# Patient Record
Sex: FEMALE | Race: WHITE | NOT HISPANIC OR LATINO | Employment: OTHER | URBAN - NONMETROPOLITAN AREA
[De-identification: names, ages, dates, MRNs, and addresses within clinical notes are randomized per-mention and may not be internally consistent; named-entity substitution may affect disease eponyms.]

---

## 2024-09-09 ENCOUNTER — TRANSCRIBE ORDERS (OUTPATIENT)
Dept: ADMINISTRATIVE | Facility: HOSPITAL | Age: 64
End: 2024-09-09
Payer: MEDICARE

## 2024-09-09 DIAGNOSIS — J18.1 LUNG CONSOLIDATION: ICD-10-CM

## 2024-09-09 DIAGNOSIS — J44.9 COPD, SEVERE: ICD-10-CM

## 2024-09-09 DIAGNOSIS — R91.8 ABNORMAL CT LUNG SCREENING: Primary | ICD-10-CM

## 2024-09-16 ENCOUNTER — HOSPITAL ENCOUNTER (OUTPATIENT)
Dept: CT IMAGING | Facility: HOSPITAL | Age: 64
Discharge: HOME | End: 2024-09-16
Payer: MEDICARE

## 2024-09-16 ENCOUNTER — TRANSCRIBE ORDERS (OUTPATIENT)
Dept: ADMINISTRATIVE | Facility: HOSPITAL | Age: 64
End: 2024-09-16
Payer: MEDICARE

## 2024-09-16 DIAGNOSIS — R91.8 ABNORMAL CT LUNG SCREENING: ICD-10-CM

## 2024-09-16 DIAGNOSIS — J18.1 LUNG CONSOLIDATION: ICD-10-CM

## 2024-09-16 DIAGNOSIS — J44.9 COPD, SEVERE: ICD-10-CM

## 2024-09-16 PROCEDURE — 78815 PET IMAGE W/CT SKULL-THIGH: CPT

## 2024-09-16 PROCEDURE — A9552 F18 FDG: HCPCS | Performed by: NURSE PRACTITIONER

## 2024-09-16 PROCEDURE — 0 FLUDEOXYGLUCOSE F18 SOLUTION: Performed by: NURSE PRACTITIONER

## 2024-09-16 RX ADMIN — FLUDEOXYGLUCOSE F 18 1 DOSE: 200 INJECTION, SOLUTION INTRAVENOUS at 10:43

## 2024-09-24 ENCOUNTER — TELEPHONE (OUTPATIENT)
Dept: MRI IMAGING | Facility: HOSPITAL | Age: 64
End: 2024-09-24
Payer: MEDICARE

## 2024-10-09 NOTE — PROGRESS NOTES
"Chief Complaint  Lung Nodule    Subjective    History of Present Illness {  Problem List  Visit Diagnosis   Encounters  Notes  Medications  Labs  Result Review Imaging  Media     Deana Stack presents to [facility] PULMONARY & CRITICAL CARE MEDICINE for:    History of Present Illness  Ms. Stack is here as a new patient to establish care.  She had a low-dose CT chest completed in July 2024 showing a new left upper lobe lung nodule measuring approximately 24 x 12 mm. PET scan was completed in September 2024 showing abnormal uptake in area of concern, SUV 4.3. She reports a history of copd. She used to be on Spiriva but is not currently on any inhalers. She tells me her breathing is fine. Her mother had breast cancer. She smokes 0.5-1 pack of cigarettes per day and has for approximately 47 years.She does tell me she had bronchitis and pneumonia a lot as a young person.        Prior to Admission medications    Not on File       Social History     Socioeconomic History    Marital status:    Tobacco Use    Smoking status: Every Day     Current packs/day: 0.50     Average packs/day: 0.5 packs/day for 47.8 years (23.9 ttl pk-yrs)     Types: Cigarettes     Start date: 1977    Smokeless tobacco: Never   Vaping Use    Vaping status: Never Used       Objective   Vital Signs:   /72   Pulse 70   Ht 154.9 cm (61\")   Wt 61 kg (134 lb 6.4 oz)   SpO2 93%   BMI 25.39 kg/m²     Physical Exam   Result Review :{ Labs  Result Review  Imaging  Med Tab  Media :          No results found for this or any previous visit.    Rest/Exercise Pulse Ox Values          10/14/2024    09:15   Rest/Exercise Pulse Ox Results   Rest room air SAT % 98   Exercise room air SAT % 97                CT outside films (07/31/2024 00:00)   My interpretation of imaging: Emphysema, left upper lobe lung nodule        Assessment and Plan { Problem List  Visit Diagnosis  ROS  Review (Popup)  Health Maintenance "  Quality  BestPractice  Medications  [facility]  SnapShot Encounters  Media      Diagnoses and all orders for this visit:    1. Left upper lobe pulmonary nodule (Primary)  Comments:  schedule for Ion robotic bronchoscopy with Dr. Dewitt. Risks and benefits discussed. She is not on any blood thinners.    2. Centrilobular emphysema  Comments:  walking oximetry was favorable. Check AAT.  Orders:  -     Walking Oximetry  -     Cancel: Alpha - 1 - Antitrypsin; Future  -     Alpha - 1 - Antitrypsin; Future    3. Current every day smoker  Comments:  cessation education provided.          Plan as above. Office follow up in a few weeks post procedure. Call sooner if needed.     Marilyn Reyes, APRKATY  10/14/2024  10:07 CDT    Follow Up {Instructions Charge Capture  Follow-up Communications   Return in about 5 weeks (around 11/18/2024) for Full pft.    Patient was given instructions and counseling regarding her condition or for health maintenance advice. Please see specific information pulled into the AVS if appropriate.

## 2024-10-14 ENCOUNTER — TELEPHONE (OUTPATIENT)
Dept: PULMONOLOGY | Facility: CLINIC | Age: 64
End: 2024-10-14

## 2024-10-14 ENCOUNTER — PREP FOR SURGERY (OUTPATIENT)
Dept: OTHER | Facility: HOSPITAL | Age: 64
End: 2024-10-14
Payer: MEDICARE

## 2024-10-14 ENCOUNTER — OFFICE VISIT (OUTPATIENT)
Dept: PULMONOLOGY | Facility: CLINIC | Age: 64
End: 2024-10-14
Payer: MEDICARE

## 2024-10-14 VITALS
BODY MASS INDEX: 25.37 KG/M2 | SYSTOLIC BLOOD PRESSURE: 118 MMHG | DIASTOLIC BLOOD PRESSURE: 72 MMHG | HEIGHT: 61 IN | HEART RATE: 70 BPM | WEIGHT: 134.4 LBS | OXYGEN SATURATION: 93 %

## 2024-10-14 DIAGNOSIS — R91.1 LEFT UPPER LOBE PULMONARY NODULE: Primary | ICD-10-CM

## 2024-10-14 DIAGNOSIS — F17.200 CURRENT EVERY DAY SMOKER: Chronic | ICD-10-CM

## 2024-10-14 DIAGNOSIS — J43.2 CENTRILOBULAR EMPHYSEMA: Chronic | ICD-10-CM

## 2024-10-14 DIAGNOSIS — R91.1 LEFT UPPER LOBE PULMONARY NODULE: Primary | Chronic | ICD-10-CM

## 2024-10-14 PROCEDURE — 1160F RVW MEDS BY RX/DR IN RCRD: CPT | Performed by: NURSE PRACTITIONER

## 2024-10-14 PROCEDURE — 1159F MED LIST DOCD IN RCRD: CPT | Performed by: NURSE PRACTITIONER

## 2024-10-14 PROCEDURE — 99204 OFFICE O/P NEW MOD 45 MIN: CPT | Performed by: NURSE PRACTITIONER

## 2024-10-14 RX ORDER — MIRTAZAPINE 15 MG/1
15 TABLET, ORALLY DISINTEGRATING ORAL NIGHTLY
COMMUNITY

## 2024-10-14 RX ORDER — SODIUM CHLORIDE 9 MG/ML
40 INJECTION, SOLUTION INTRAVENOUS AS NEEDED
OUTPATIENT
Start: 2024-10-28 | End: 2024-10-29

## 2024-10-14 RX ORDER — SODIUM CHLORIDE 0.9 % (FLUSH) 0.9 %
10 SYRINGE (ML) INJECTION EVERY 12 HOURS SCHEDULED
OUTPATIENT
Start: 2024-10-14

## 2024-10-14 RX ORDER — HYDROXYZINE HYDROCHLORIDE 10 MG/1
10 TABLET, FILM COATED ORAL 3 TIMES DAILY PRN
COMMUNITY

## 2024-10-14 RX ORDER — SODIUM CHLORIDE 0.9 % (FLUSH) 0.9 %
10 SYRINGE (ML) INJECTION AS NEEDED
OUTPATIENT
Start: 2024-10-14

## 2024-10-14 NOTE — H&P
"Chief Complaint  Lung Nodule    Subjective    History of Present Illness {  Problem List  Visit Diagnosis   Encounters  Notes  Medications  Labs  Result Review Imaging  Media     Deana Stack presents to [facility] PULMONARY & CRITICAL CARE MEDICINE for:    History of Present Illness  Ms. Stack is here as a new patient to establish care.  She had a low-dose CT chest completed in July 2024 showing a new left upper lobe lung nodule measuring approximately 24 x 12 mm. PET scan was completed in September 2024 showing abnormal uptake in area of concern, SUV 4.3. She reports a history of copd. She used to be on Spiriva but is not currently on any inhalers. She tells me her breathing is fine. Her mother had breast cancer. She smokes 0.5-1 pack of cigarettes per day and has for approximately 47 years.She does tell me she had bronchitis and pneumonia a lot as a young person.        Prior to Admission medications    Not on File       Social History     Socioeconomic History    Marital status:    Tobacco Use    Smoking status: Every Day     Current packs/day: 0.50     Average packs/day: 0.5 packs/day for 47.8 years (23.9 ttl pk-yrs)     Types: Cigarettes     Start date: 1977    Smokeless tobacco: Never   Vaping Use    Vaping status: Never Used       Objective   Vital Signs:   /72   Pulse 70   Ht 154.9 cm (61\")   Wt 61 kg (134 lb 6.4 oz)   SpO2 93%   BMI 25.39 kg/m²     Physical Exam   Result Review :{ Labs  Result Review  Imaging  Med Tab  Media :          No results found for this or any previous visit.    Rest/Exercise Pulse Ox Values          10/14/2024    09:15   Rest/Exercise Pulse Ox Results   Rest room air SAT % 98   Exercise room air SAT % 97                CT outside films (07/31/2024 00:00)   My interpretation of imaging: Emphysema, left upper lobe lung nodule        Assessment and Plan { Problem List  Visit Diagnosis  ROS  Review (Popup)  Health Maintenance "  Quality  BestPractice  Medications  [facility]  SnapShot Encounters  Media      Diagnoses and all orders for this visit:    1. Left upper lobe pulmonary nodule (Primary)  Comments:  schedule for Ion robotic bronchoscopy with Dr. Dewtit. Risks and benefits discussed. She is not on any blood thinners.    2. Centrilobular emphysema  Comments:  walking oximetry was favorable. Check AAT.  Orders:  -     Walking Oximetry  -     Cancel: Alpha - 1 - Antitrypsin; Future  -     Alpha - 1 - Antitrypsin; Future    3. Current every day smoker  Comments:  cessation education provided.          Plan as above. Office follow up in a few weeks post procedure. Call sooner if needed.     Marilyn Reyes, APRKATY  10/14/2024  10:07 CDT    Follow Up {Instructions Charge Capture  Follow-up Communications   Return in about 5 weeks (around 11/18/2024) for Full pft.    Patient was given instructions and counseling regarding her condition or for health maintenance advice. Please see specific information pulled into the AVS if appropriate.

## 2024-10-14 NOTE — PROCEDURES
Walking Oximetry    Performed by: Marilyn Reyes APRN  Authorized by: Marilyn Reyes APRN    Rest room air SAT %:  98  Exercise room air SAT %:  97

## 2024-10-14 NOTE — H&P (VIEW-ONLY)
"Chief Complaint  Lung Nodule    Subjective    History of Present Illness {  Problem List  Visit Diagnosis   Encounters  Notes  Medications  Labs  Result Review Imaging  Media     Deana Stack presents to [facility] PULMONARY & CRITICAL CARE MEDICINE for:    History of Present Illness  Ms. Stack is here as a new patient to establish care.  She had a low-dose CT chest completed in July 2024 showing a new left upper lobe lung nodule measuring approximately 24 x 12 mm. PET scan was completed in September 2024 showing abnormal uptake in area of concern, SUV 4.3. She reports a history of copd. She used to be on Spiriva but is not currently on any inhalers. She tells me her breathing is fine. Her mother had breast cancer. She smokes 0.5-1 pack of cigarettes per day and has for approximately 47 years.She does tell me she had bronchitis and pneumonia a lot as a young person.        Prior to Admission medications    Not on File       Social History     Socioeconomic History    Marital status:    Tobacco Use    Smoking status: Every Day     Current packs/day: 0.50     Average packs/day: 0.5 packs/day for 47.8 years (23.9 ttl pk-yrs)     Types: Cigarettes     Start date: 1977    Smokeless tobacco: Never   Vaping Use    Vaping status: Never Used       Objective   Vital Signs:   /72   Pulse 70   Ht 154.9 cm (61\")   Wt 61 kg (134 lb 6.4 oz)   SpO2 93%   BMI 25.39 kg/m²     Physical Exam   Result Review :{ Labs  Result Review  Imaging  Med Tab  Media :          No results found for this or any previous visit.    Rest/Exercise Pulse Ox Values          10/14/2024    09:15   Rest/Exercise Pulse Ox Results   Rest room air SAT % 98   Exercise room air SAT % 97                CT outside films (07/31/2024 00:00)   My interpretation of imaging: Emphysema, left upper lobe lung nodule        Assessment and Plan { Problem List  Visit Diagnosis  ROS  Review (Popup)  Health Maintenance "  Quality  BestPractice  Medications  [facility]  SnapShot Encounters  Media      Diagnoses and all orders for this visit:    1. Left upper lobe pulmonary nodule (Primary)  Comments:  schedule for Ion robotic bronchoscopy with Dr. Dewitt. Risks and benefits discussed. She is not on any blood thinners.    2. Centrilobular emphysema  Comments:  walking oximetry was favorable. Check AAT.  Orders:  -     Walking Oximetry  -     Cancel: Alpha - 1 - Antitrypsin; Future  -     Alpha - 1 - Antitrypsin; Future    3. Current every day smoker  Comments:  cessation education provided.          Plan as above. Office follow up in a few weeks post procedure. Call sooner if needed.     Marilyn Reyes, APRKATY  10/14/2024  10:07 CDT    Follow Up {Instructions Charge Capture  Follow-up Communications   Return in about 5 weeks (around 11/18/2024) for Full pft.    Patient was given instructions and counseling regarding her condition or for health maintenance advice. Please see specific information pulled into the AVS if appropriate.      no known allergies

## 2024-10-22 ENCOUNTER — HOSPITAL ENCOUNTER (OUTPATIENT)
Dept: CT IMAGING | Facility: HOSPITAL | Age: 64
Discharge: HOME | End: 2024-10-22
Payer: MEDICARE

## 2024-10-22 ENCOUNTER — PRE-ADMISSION TESTING (OUTPATIENT)
Dept: PREADMISSION TESTING | Facility: HOSPITAL | Age: 64
End: 2024-10-22
Payer: MEDICARE

## 2024-10-22 VITALS
BODY MASS INDEX: 24.71 KG/M2 | HEIGHT: 62 IN | RESPIRATION RATE: 18 BRPM | HEART RATE: 86 BPM | WEIGHT: 134.26 LBS | DIASTOLIC BLOOD PRESSURE: 82 MMHG | SYSTOLIC BLOOD PRESSURE: 141 MMHG

## 2024-10-22 DIAGNOSIS — R91.1 LEFT UPPER LOBE PULMONARY NODULE: ICD-10-CM

## 2024-10-22 LAB
DEPRECATED RDW RBC AUTO: 43.9 FL (ref 37–54)
ERYTHROCYTE [DISTWIDTH] IN BLOOD BY AUTOMATED COUNT: 13.3 % (ref 12.3–15.4)
HCT VFR BLD AUTO: 41.7 % (ref 34–46.6)
HGB BLD-MCNC: 13.6 G/DL (ref 12–15.9)
MCH RBC QN AUTO: 29.1 PG (ref 26.6–33)
MCHC RBC AUTO-ENTMCNC: 32.6 G/DL (ref 31.5–35.7)
MCV RBC AUTO: 89.3 FL (ref 79–97)
PLATELET # BLD AUTO: 213 10*3/MM3 (ref 140–450)
PMV BLD AUTO: 9.5 FL (ref 6–12)
RBC # BLD AUTO: 4.67 10*6/MM3 (ref 3.77–5.28)
WBC NRBC COR # BLD AUTO: 7.01 10*3/MM3 (ref 3.4–10.8)

## 2024-10-22 PROCEDURE — 85027 COMPLETE CBC AUTOMATED: CPT

## 2024-10-22 PROCEDURE — 71250 CT THORAX DX C-: CPT

## 2024-10-22 PROCEDURE — 36415 COLL VENOUS BLD VENIPUNCTURE: CPT

## 2024-10-22 RX ORDER — MULTIVIT WITH MINERALS/LUTEIN
250 TABLET ORAL DAILY
COMMUNITY

## 2024-10-22 RX ORDER — ZINC SULFATE 50(220)MG
220 CAPSULE ORAL DAILY
COMMUNITY

## 2024-10-22 RX ORDER — MULTIPLE VITAMINS W/ MINERALS TAB 9MG-400MCG
1 TAB ORAL DAILY
COMMUNITY

## 2024-10-22 RX ORDER — OMEGA-3S/DHA/EPA/FISH OIL/D3 300MG-1000
400 CAPSULE ORAL DAILY
COMMUNITY

## 2024-10-22 NOTE — DISCHARGE INSTRUCTIONS
Preparing for Surgery  Follow these instructions before the procedure:  Several days or weeks before your procedure  Medication(s) you need to stop   _______ days/week prior to surgery: ***      Ask your health care provider about:  Changing or stopping your regular medicines. This is especially important if you are taking diabetes medicines or blood thinners.  Taking medicines such as aspirin and ibuprofen. These medicines can thin your blood. Do not take these medicines unless your health care provider tells you to take them.  Taking over-the-counter medicines, vitamins, herbs, and supplements.    Contact your surgeon if you:  Develop a fever of more than 100.4°F (38°C) or other feelings of illness during the 48 hours before your surgery.  Have symptoms that get worse.  Have questions or concerns about your surgery.  If you are going home the same day of your surgery you will need to arrange for a responsible adult, age 18 years old or older, to drive you home from the hospital and stay with you for 24 hours. Verification of the  will be made prior to any procedure requiring sedation. You may not go home in a taxi or any form of public transportation by yourself.     Day before your procedure  Medication(s) you need to stop the day before your surgery:***    24 hours before your procedure DO NOT drink alcoholic beverages or smoke.  24 hours before your procedure STOP taking Erectile Dysfunction medication (i.e.,Cialis, Viagra)   You may be asked to shower with a germ-killing soap.  Day of your procedure   You may take the following medication(s) the morning of surgery with a sip of water: ***      8 hours before your scheduled arrival time, STOP all food, any dairy products, and full liquids. This includes hard candy, chewing gum or mints. This is extremely important to prevent serious complications.     Up to 2 hours before your scheduled arrival time, you may have clear liquids no cream, powder, or pulp of  any kind. Safe options are water, black coffee, plain tea, soda, Gatorade/Powerade, clear broth, apple juice.    2 hours before your scheduled arrival time, STOP drinking clear liquids.    You may need to take another shower with a germ-killing soap before you leave home in the morning. Do not use perfumes, colognes, or body lotions.  Wear comfortable loose-fitting clothing.  Remove all jewelry including body piercing and rings, dark colored nail polish, and make up prior to arrival at the hospital. Leave all valuables at home.   Bring your hearing aids if you rely on them.  Do not wear contact lenses. If you wear eyeglasses remember to bring a case to store them in while you are in surgery.  Do not use denture adhesives since you will be asked to remove them during your surgery.    You do not need to bring your home medications into the hospital.   Bring your sleep apnea device with you on the day of your surgery (if this applies to you).  If you wear portable oxygen, bring it with you.   If you are staying overnight, you may bring a bag of items you may need such as slippers, robe and a change of clothes for your discharge. You may want to leave these items in the car until you are ready for them since your family will take your belongings when you leave the pre-operative area.  Arrive at the hospital as scheduled by the office. You will be asked to arrive 2 hours prior to your surgery time in order to prepare for your procedure.  When you arrive at the hospital  Go to the registration desk located at the main entrance of the hospital.  After registration is completed, you will be given a beeper and a sticker sheet. Take the stickers to Outpatient Surgery and place in the tray at the end of the desk to notify the staff that you have arrived and registered.   Return to the lobby to wait. You are not always called back according to the time of arrival but rather the time your doctor will be ready.  When your beeper  lights up and vibrates proceed through the double doors, under the stairs, and a member of the Outpatient Surgery staff will escort you to your preoperative room.

## 2024-10-24 ENCOUNTER — PATIENT ROUNDING (BHMG ONLY) (OUTPATIENT)
Dept: PULMONOLOGY | Facility: CLINIC | Age: 64
End: 2024-10-24
Payer: MEDICARE

## 2024-10-24 NOTE — PROGRESS NOTES
October 24, 2024    Hello, may I speak with Deana Stack?    My name is Jessi Montero    I am  with [facility] PULMONARY [facility] CRITICAL CARE MEDICINE  [address]  [facility] [address] [phone].    Before we get started may I verify your date of birth? 1960    I am calling to officially welcome you to our practice and ask about your recent visit. Is this a good time to talk? LVM    Tell me about your visit with us. What things went well?         We're always looking for ways to make our patients' experiences even better. Do you have recommendations on ways we may improve?      Overall were you satisfied with your first visit to our practice?        I appreciate you taking the time to speak with me today. Is there anything else I can do for you?       Thank you, and have a great day.

## 2024-10-28 ENCOUNTER — ANESTHESIA (OUTPATIENT)
Dept: PERIOP | Facility: HOSPITAL | Age: 64
End: 2024-10-28
Payer: MEDICARE

## 2024-10-28 ENCOUNTER — APPOINTMENT (OUTPATIENT)
Dept: GENERAL RADIOLOGY | Facility: HOSPITAL | Age: 64
End: 2024-10-28
Payer: MEDICARE

## 2024-10-28 ENCOUNTER — ANESTHESIA EVENT (OUTPATIENT)
Dept: PERIOP | Facility: HOSPITAL | Age: 64
End: 2024-10-28
Payer: MEDICARE

## 2024-10-28 ENCOUNTER — HOSPITAL ENCOUNTER (OUTPATIENT)
Facility: HOSPITAL | Age: 64
Setting detail: HOSPITAL OUTPATIENT SURGERY
Discharge: HOME | End: 2024-10-28
Attending: INTERNAL MEDICINE | Admitting: INTERNAL MEDICINE
Payer: MEDICARE

## 2024-10-28 VITALS
RESPIRATION RATE: 14 BRPM | TEMPERATURE: 98 F | HEART RATE: 73 BPM | DIASTOLIC BLOOD PRESSURE: 74 MMHG | SYSTOLIC BLOOD PRESSURE: 135 MMHG | OXYGEN SATURATION: 97 %

## 2024-10-28 DIAGNOSIS — R91.1 LEFT UPPER LOBE PULMONARY NODULE: ICD-10-CM

## 2024-10-28 LAB — KOH PREP NAIL: NORMAL

## 2024-10-28 PROCEDURE — 25010000002 SUGAMMADEX 200 MG/2ML SOLUTION

## 2024-10-28 PROCEDURE — C1726 CATH, BAL DIL, NON-VASCULAR: HCPCS | Performed by: INTERNAL MEDICINE

## 2024-10-28 PROCEDURE — 88342 IMHCHEM/IMCYTCHM 1ST ANTB: CPT | Performed by: INTERNAL MEDICINE

## 2024-10-28 PROCEDURE — 88305 TISSUE EXAM BY PATHOLOGIST: CPT | Performed by: INTERNAL MEDICINE

## 2024-10-28 PROCEDURE — 31654 BRONCH EBUS IVNTJ PERPH LES: CPT | Performed by: INTERNAL MEDICINE

## 2024-10-28 PROCEDURE — 25010000002 FENTANYL CITRATE (PF) 100 MCG/2ML SOLUTION

## 2024-10-28 PROCEDURE — 71045 X-RAY EXAM CHEST 1 VIEW: CPT

## 2024-10-28 PROCEDURE — 31629 BRONCHOSCOPY/NEEDLE BX EACH: CPT | Performed by: INTERNAL MEDICINE

## 2024-10-28 PROCEDURE — 87102 FUNGUS ISOLATION CULTURE: CPT | Performed by: INTERNAL MEDICINE

## 2024-10-28 PROCEDURE — 87116 MYCOBACTERIA CULTURE: CPT | Performed by: INTERNAL MEDICINE

## 2024-10-28 PROCEDURE — 25010000002 DEXAMETHASONE PER 1 MG

## 2024-10-28 PROCEDURE — 25810000003 LACTATED RINGERS PER 1000 ML: Performed by: INTERNAL MEDICINE

## 2024-10-28 PROCEDURE — 31628 BRONCHOSCOPY/LUNG BX EACH: CPT | Performed by: INTERNAL MEDICINE

## 2024-10-28 PROCEDURE — 31627 NAVIGATIONAL BRONCHOSCOPY: CPT | Performed by: INTERNAL MEDICINE

## 2024-10-28 PROCEDURE — 87220 TISSUE EXAM FOR FUNGI: CPT | Performed by: INTERNAL MEDICINE

## 2024-10-28 PROCEDURE — 88112 CYTOPATH CELL ENHANCE TECH: CPT | Performed by: INTERNAL MEDICINE

## 2024-10-28 PROCEDURE — 87206 SMEAR FLUORESCENT/ACID STAI: CPT | Performed by: INTERNAL MEDICINE

## 2024-10-28 PROCEDURE — 88172 CYTP DX EVAL FNA 1ST EA SITE: CPT | Performed by: INTERNAL MEDICINE

## 2024-10-28 PROCEDURE — 87070 CULTURE OTHR SPECIMN AEROBIC: CPT | Performed by: INTERNAL MEDICINE

## 2024-10-28 PROCEDURE — 25010000002 ONDANSETRON PER 1 MG

## 2024-10-28 PROCEDURE — 25010000002 PROPOFOL 10 MG/ML EMULSION

## 2024-10-28 PROCEDURE — 88341 IMHCHEM/IMCYTCHM EA ADD ANTB: CPT | Performed by: INTERNAL MEDICINE

## 2024-10-28 PROCEDURE — 87205 SMEAR GRAM STAIN: CPT | Performed by: INTERNAL MEDICINE

## 2024-10-28 PROCEDURE — 25010000002 LIDOCAINE PF 2% 2 % SOLUTION

## 2024-10-28 PROCEDURE — 31652 BRONCH EBUS SAMPLNG 1/2 NODE: CPT | Performed by: INTERNAL MEDICINE

## 2024-10-28 PROCEDURE — 76000 FLUOROSCOPY <1 HR PHYS/QHP: CPT

## 2024-10-28 RX ORDER — SODIUM CHLORIDE 0.9 % (FLUSH) 0.9 %
10 SYRINGE (ML) INJECTION AS NEEDED
Status: DISCONTINUED | OUTPATIENT
Start: 2024-10-28 | End: 2024-10-28 | Stop reason: HOSPADM

## 2024-10-28 RX ORDER — LIDOCAINE HYDROCHLORIDE 20 MG/ML
INJECTION, SOLUTION EPIDURAL; INFILTRATION; INTRACAUDAL; PERINEURAL AS NEEDED
Status: DISCONTINUED | OUTPATIENT
Start: 2024-10-28 | End: 2024-10-28 | Stop reason: SURG

## 2024-10-28 RX ORDER — SODIUM CHLORIDE 0.9 % (FLUSH) 0.9 %
10 SYRINGE (ML) INJECTION EVERY 12 HOURS SCHEDULED
Status: DISCONTINUED | OUTPATIENT
Start: 2024-10-28 | End: 2024-10-28 | Stop reason: HOSPADM

## 2024-10-28 RX ORDER — DEXAMETHASONE SODIUM PHOSPHATE 4 MG/ML
INJECTION, SOLUTION INTRA-ARTICULAR; INTRALESIONAL; INTRAMUSCULAR; INTRAVENOUS; SOFT TISSUE AS NEEDED
Status: DISCONTINUED | OUTPATIENT
Start: 2024-10-28 | End: 2024-10-28 | Stop reason: SURG

## 2024-10-28 RX ORDER — ROCURONIUM BROMIDE 10 MG/ML
INJECTION, SOLUTION INTRAVENOUS AS NEEDED
Status: DISCONTINUED | OUTPATIENT
Start: 2024-10-28 | End: 2024-10-28 | Stop reason: SURG

## 2024-10-28 RX ORDER — LIDOCAINE HYDROCHLORIDE 10 MG/ML
0.5 INJECTION, SOLUTION EPIDURAL; INFILTRATION; INTRACAUDAL; PERINEURAL ONCE AS NEEDED
Status: DISCONTINUED | OUTPATIENT
Start: 2024-10-28 | End: 2024-10-28 | Stop reason: HOSPADM

## 2024-10-28 RX ORDER — PROPOFOL 10 MG/ML
VIAL (ML) INTRAVENOUS AS NEEDED
Status: DISCONTINUED | OUTPATIENT
Start: 2024-10-28 | End: 2024-10-28 | Stop reason: SURG

## 2024-10-28 RX ORDER — SODIUM CHLORIDE 0.9 % (FLUSH) 0.9 %
3 SYRINGE (ML) INJECTION AS NEEDED
Status: DISCONTINUED | OUTPATIENT
Start: 2024-10-28 | End: 2024-10-28 | Stop reason: HOSPADM

## 2024-10-28 RX ORDER — SODIUM CHLORIDE 9 MG/ML
40 INJECTION, SOLUTION INTRAVENOUS AS NEEDED
Status: DISCONTINUED | OUTPATIENT
Start: 2024-10-28 | End: 2024-10-28 | Stop reason: HOSPADM

## 2024-10-28 RX ORDER — ONDANSETRON 2 MG/ML
INJECTION INTRAMUSCULAR; INTRAVENOUS AS NEEDED
Status: DISCONTINUED | OUTPATIENT
Start: 2024-10-28 | End: 2024-10-28 | Stop reason: SURG

## 2024-10-28 RX ORDER — SODIUM CHLORIDE, SODIUM LACTATE, POTASSIUM CHLORIDE, CALCIUM CHLORIDE 600; 310; 30; 20 MG/100ML; MG/100ML; MG/100ML; MG/100ML
20 INJECTION, SOLUTION INTRAVENOUS CONTINUOUS
Status: DISCONTINUED | OUTPATIENT
Start: 2024-10-28 | End: 2024-10-28 | Stop reason: HOSPADM

## 2024-10-28 RX ORDER — PHENYLEPHRINE HCL IN 0.9% NACL 1 MG/10 ML
SYRINGE (ML) INTRAVENOUS AS NEEDED
Status: DISCONTINUED | OUTPATIENT
Start: 2024-10-28 | End: 2024-10-28 | Stop reason: SURG

## 2024-10-28 RX ORDER — FENTANYL CITRATE 50 UG/ML
INJECTION, SOLUTION INTRAMUSCULAR; INTRAVENOUS AS NEEDED
Status: DISCONTINUED | OUTPATIENT
Start: 2024-10-28 | End: 2024-10-28 | Stop reason: SURG

## 2024-10-28 RX ADMIN — DEXAMETHASONE SODIUM PHOSPHATE 8 MG: 4 INJECTION INTRA-ARTICULAR; INTRALESIONAL; INTRAMUSCULAR; INTRAVENOUS; SOFT TISSUE at 13:58

## 2024-10-28 RX ADMIN — LIDOCAINE HYDROCHLORIDE 60 MG: 20 INJECTION, SOLUTION EPIDURAL; INFILTRATION; INTRACAUDAL; PERINEURAL at 13:43

## 2024-10-28 RX ADMIN — Medication 200 MCG: at 13:49

## 2024-10-28 RX ADMIN — ROCURONIUM 70 MG: 50 INJECTION, SOLUTION INTRAVENOUS at 13:43

## 2024-10-28 RX ADMIN — Medication 100 MCG: at 13:58

## 2024-10-28 RX ADMIN — SODIUM CHLORIDE, POTASSIUM CHLORIDE, SODIUM LACTATE AND CALCIUM CHLORIDE 20 ML/HR: 600; 310; 30; 20 INJECTION, SOLUTION INTRAVENOUS at 12:37

## 2024-10-28 RX ADMIN — SUGAMMADEX 200 MG: 100 INJECTION, SOLUTION INTRAVENOUS at 14:32

## 2024-10-28 RX ADMIN — ONDANSETRON 4 MG: 2 INJECTION INTRAMUSCULAR; INTRAVENOUS at 14:00

## 2024-10-28 RX ADMIN — PROPOFOL 150 MG: 10 INJECTION, EMULSION INTRAVENOUS at 13:43

## 2024-10-28 RX ADMIN — Medication 100 MCG: at 14:09

## 2024-10-28 RX ADMIN — Medication 200 MCG: at 14:04

## 2024-10-28 RX ADMIN — Medication 100 MCG: at 14:16

## 2024-10-28 RX ADMIN — FENTANYL CITRATE 100 MCG: 50 INJECTION, SOLUTION INTRAMUSCULAR; INTRAVENOUS at 13:41

## 2024-10-28 RX ADMIN — Medication 100 MCG: at 14:29

## 2024-10-28 NOTE — ANESTHESIA POSTPROCEDURE EVALUATION
Patient: Deana Stack    Procedure Summary       Date: 10/28/24 Room / Location: [facility] OR  /  [facility] OR    Anesthesia Start: 1340 Anesthesia Stop: 1450    Procedure: BRONCHOSCOPY WITH ION ROBOT (Bronchus) Diagnosis:       Left upper lobe pulmonary nodule      (Left upper lobe pulmonary nodule [R91.1])    Surgeons: Donovan Dewitt MD Provider: Allan Orellana CRNA    Anesthesia Type: general ASA Status: 2            Anesthesia Type: general    Vitals  Vitals Value Taken Time   /72 10/28/24 1455   Temp 98 °F (36.7 °C) 10/28/24 1500   Pulse 98 10/28/24 1500   Resp 15 10/28/24 1500   SpO2 94 % 10/28/24 1500           Post Anesthesia Care and Evaluation    Patient location during evaluation: PACU  Patient participation: complete - patient participated  Level of consciousness: awake and alert  Pain management: adequate    Airway patency: patent  Anesthetic complications: No anesthetic complications    Cardiovascular status: acceptable  Respiratory status: acceptable  Hydration status: acceptable    Comments: Blood pressure 126/73, pulse 85, temperature 98 °F (36.7 °C), temperature source Temporal, resp. rate 14, SpO2 96%, not currently breastfeeding.    Pt discharged from PACU based on ashley score >8

## 2024-10-28 NOTE — OP NOTE
Procedure Note (AdvancedBronchoscopy)    Date of Operation: 10/28/24  Pre-op Diagnosis: Left upper lobe pulmonary nodule  Post-op Diagnosis: Same  Surgeon: Donovan Dewitt MD  Anesthesia: General    Operation: Flexible fiberoptic bronchoscopy, Bronchoscopy, Diagnostic, Ion robotic shape sensing navigational bronchoscopy, Radial probe endobronchial ultrasound, Linear endobronchial ultrasound, Bronchial wash, Transbronchial biopsy, Transbronchial needle aspirate of mediastinum, and Transbronchial needle aspirate of lung with navigational and fluoroscopic guidance  Findings: concentric view on radial probe, borderline left mediastinal nodes, not aspiratale, subcarinal node 2.1 cm  Specimen: bronch wash, tbna station 7, tbna GALLO, tbbx GALLO  Estimated Blood Loss: Minimal  Complications: none    Indications and History:  The patient is a 64 y.o.  female with Left upper lobe pulmonary nodule.  The risks, benefits, complications, treatment options and expected outcomes were discussed with the patient.  The possibilities of reaction to medication, pulmonary aspiration, perforation of a viscus, bleeding, failure to diagnose a condition and creating a complication requiring transfusion or operation were discussed with the patient who freely signed the consent.  Time out was taken prior to the procedure.    Description of Procedure:    After the induction of general anesthesia, the patient was positioned supine and the Olympus BF type P180 bronchoscope was introduced through the endotracheal tube.  The scope was then passed into the trachea.     The trachea, mainstem bronchi, RUL, RML, Bronchus Intermedius, RLL, GALLO, GALLO upper division and lingula, and LLL, and all primary segmental airways were examined and were normal except as described below:    Endobronchial findings:  Trachea: Normal mucosa  Karma: Sharp  Right main bronchus: Normal mucosa  Right upper lobe bronchus: Normal mucosa  Right middle lobe bronchus: Normal  mucosa  Right lower lobe bronchus: Normal mucosa  Left main bronchus: Normal mucosa  Left upper lobe bronchus: Normal mucosa  Left lower lobe bronchus: Normal mucosa    The conventional bronchoscope was removed .    Using the planning laptop and Planpoint software, the lesion of interest was identified, and marked, a pathway was generated, and anatomic borders were identified.The ION system was magnetically docked to the ETT. The shape sensing catheter with vision probe was introduced via the  into the ETT.    Registration was started by advancing the bronchoscope into the right and left mainstem bronchi. The main fabiola was confirmed by rotating the live-view image to match the navigation-view image of the main fabiola. Registration was completed by advancing the catheter into the identified the lobar airway. There was no significant visual divergence. . Using the , the shape sensing bronchoscope was advanced to the target lesion. The mobile C-Arm was brought in, and the vision probe was removed. A peripheral radial ultrasound probe was utilized to better localize the lesion in the left upper lobe anterior segment, giving an eccentric view, converted to  a concentric view after microarticulation and initial needle sampling.     Transbronchial needle aspirations of the target lesion were performed using an Intuitive Flexision 23 gauge needle and sent for routine cytology. The procedure was guided by fluoroscopy and radial ultrasound. Transbronchial needle aspiration technique was selected because the sampling site was not accessible using standard bronchoscopic techniques. 6 needle aspirations were obtained. Rapid On-Site Evaluation: positive    Transbronchial biopsies of the target lesion were performed  using a Cook DBF-1.8-S Captura spikeless forceps and sent for histopathology examination. The procedure was guided by fluoroscopy and radial ultrasound. Transbronchial biopsy technique was  selected because the sampling site was not visible endoscopically.  15  biopsy samples were obtained. 15ml of iced saline was instilled through the Ion robot catheter. The vision probe was reintruced to examine the biopsied area, and then the vision probe, and shape sensing catheter was extracted and catheter guide was disconnected.  The Olympus BF-[id number] EBUS bronchoscope was introduced and the mediastinum was examined via linear ultrasound.  Findings: borderline left mediastinal nodes at station 4L and 10L, behinnd vascular structures and not opposed to airway, enlarged 2.2 cm subcarinal node. TBNA under ultrasound guidance was collected using an Olympus VisiShot 2 22g needle from station 7, Rapid onsite evaluation: lymphocytes.  Iced saline was instilled and aspirated in small aliquots between needle passes for hemostasis. No ongoing bleeding was identified.  The bronchoscope was removed.    The patient was undocked from the ion robot arm.  The Patient was extubated and taken to the Recovery Room in satisfactory condition.      Donovan Dewitt MD at 14:37 CDT, 10/28/2024

## 2024-10-28 NOTE — ANESTHESIA PREPROCEDURE EVALUATION
Anesthesia Evaluation                  Airway   Dental      Pulmonary    (+) a smoker Current,  Cardiovascular         Neuro/Psych  GI/Hepatic/Renal/Endo    (+) GERD    Musculoskeletal     Abdominal    Substance History      OB/GYN          Other                      Anesthesia Plan    ASA 2     general     (left upper lobe lung nodule measuring approximately 24 x 12 mm    Ion protocol; thorough muscle relaxation with sugammadex reversal )  intravenous induction     Anesthetic plan, risks, benefits, and alternatives have been provided, discussed and informed consent has been obtained with: patient.      CODE STATUS:

## 2024-10-28 NOTE — ANESTHESIA PROCEDURE NOTES
Airway  Urgency: elective    Date/Time: 10/28/2024 1:45 PM  Airway not difficult    General Information and Staff    Patient location during procedure: OR    Indications and Patient Condition  Indications for airway management: airway protection    Preoxygenated: yes  MILS maintained throughout  Mask difficulty assessment: 0 - not attempted    Final Airway Details  Final airway type: endotracheal airway      Successful airway: ETT  Cuffed: yes   Successful intubation technique: video laryngoscopy  Facilitating devices/methods: intubating stylet  Endotracheal tube insertion site: oral  Blade: [place]burn  Blade size: 3  ETT size (mm): 8.0  Cormack-Lehane Classification: grade I - full view of glottis  Placement verified by: chest auscultation and capnometry   Cuff volume (mL): 5  Measured from: teeth  ETT/EBT  to teeth (cm): 19  Number of attempts at approach: 1  Assessment: lips, teeth, and gum same as pre-op and atraumatic intubation

## 2024-10-29 ENCOUNTER — TELEPHONE (OUTPATIENT)
Dept: PULMONOLOGY | Facility: CLINIC | Age: 64
End: 2024-10-29
Payer: MEDICARE

## 2024-10-29 DIAGNOSIS — J43.2 CENTRILOBULAR EMPHYSEMA: Chronic | ICD-10-CM

## 2024-10-30 LAB
BACTERIA SPEC RESP CULT: NO GROWTH
GRAM STN SPEC: NORMAL
GRAM STN SPEC: NORMAL

## 2024-10-31 LAB
BEAKER LAB AP INTRAOPERATIVE CONSULTATION: NORMAL
CYTO UR: NORMAL
LAB AP CASE REPORT: NORMAL
LAB AP DIAGNOSIS COMMENT: NORMAL
Lab: NORMAL
PATH REPORT.FINAL DX SPEC: NORMAL
PATH REPORT.GROSS SPEC: NORMAL

## 2024-11-01 DIAGNOSIS — C34.12 ADENOCARCINOMA OF UPPER LOBE OF LEFT LUNG: Primary | ICD-10-CM

## 2024-11-01 NOTE — PROGRESS NOTES
Discussed dx with pt, adenocarcinoma  Nodes negative  Primary tumor 2.6 cm  We discussed option of surgery (lobectomy/wedge) +/- chemotx vs sbrt  Pt would like referral to surgery.  Please indicate referral as urgent/high priority  Prisca see if we can move follow up to sooner with PFT sooner than scheduled.  If not, have her go for complete PFT at the hospital

## 2024-11-01 NOTE — PROGRESS NOTES
"Chief Complaint  Left upper lobe pulmonary nodule    Subjective    History of Present Illness {CC  Problem List  Visit Diagnosis   Encounters  Notes  Medications  Labs  Result Review Imaging  Media     Deana Stack presents to [facility] for:    History of Present Illness  Ms Stack is here for postprocedural follow-up.  She underwent Ion robotic bronchoscopy with Dr. Dewitt around 10- for left upper lobe pulmonary nodule.  Cytology was positive for adenocarcinoma.  Mediastinal nodes negative. Unfortunately she continues to smoke. PFT completed today showing very mild obstruction.          Prior to Admission medications    Medication Sig Start Date End Date Taking? Authorizing Provider   Cholecalciferol 10 MCG (400 UNIT) tablet Take 1 tablet by mouth Daily.    Zeus Sanchez MD   hydrOXYzine (ATARAX) 10 MG tablet Take 1 tablet by mouth 3 (Three) Times a Day As Needed for Anxiety.    Zeus Sanchez MD   mirtazapine (REMERON SOL-TAB) 15 MG disintegrating tablet Place 1 tablet on the tongue Every Night.    Zeus Sanchez MD   multivitamin with minerals tablet tablet Take 1 tablet by mouth Daily.    Zeus Sanchez MD   vitamin C (ASCORBIC ACID) 250 MG tablet Take 1 tablet by mouth Daily.    Zeus Sanchez MD   zinc sulfate (ZINCATE) 220 (50 Zn) MG capsule Take 1 capsule by mouth Daily.    Zeus Sanchez MD       Social History     Socioeconomic History    Marital status:    Tobacco Use    Smoking status: Every Day     Current packs/day: 0.50     Average packs/day: 0.5 packs/day for 47.8 years (23.9 ttl pk-yrs)     Types: Cigarettes     Start date: 1977     Passive exposure: Current    Smokeless tobacco: Never   Vaping Use    Vaping status: Never Used   Substance and Sexual Activity    Alcohol use: Never    Drug use: Never       Objective   Vital Signs:   /72   Pulse 93   Ht 153.7 cm (60.5\")   Wt " 61.2 kg (135 lb)   SpO2 96% Comment: ra  BMI 25.93 kg/m²     Physical Exam  Constitutional:       General: She is not in acute distress.  HENT:      Head: Normocephalic.      Nose: Nose normal.      Mouth/Throat:      Mouth: Mucous membranes are moist.   Eyes:      General: No scleral icterus.  Cardiovascular:      Rate and Rhythm: Normal rate.   Pulmonary:      Effort: No respiratory distress.   Abdominal:      General: There is no distension.   Neurological:      Mental Status: She is alert and oriented to person, place, and time.   Psychiatric:         Mood and Affect: Mood normal.         Behavior: Behavior is cooperative.        Result Review :{ Labs  Result Review  Imaging  Med Tab  Media :    PFT Values          2024    13:30   Pre Drug PFT Results      FEV1 87   FEF 25-75% 46   FEV1/FVC 66.44   Other Tests PFT Results         DLCO 91   D/VAsb 71     My interpretation of the PFT : mild obstruction, hyperinflation, normal dlco     Results for orders placed in visit on 24    Spirometry with Diffusion Capacity & Lung Volumes    Narrative  Spirometry with Diffusion Capacity & Lung Volumes    Performed by: Christiano Kumar CMA  Authorized by: Marilyn Reyes APRN  Pre Drug % Predicted  FVC: 106%  FEV1: 87%  FEF 25-75%: 46%  FEV1/FVC: 66.44%  T%  RV: 191%  DLCO: 91%  D/VAsb: 71%    Rest/Exercise Pulse Ox Values          10/14/2024    09:15   Rest/Exercise Pulse Ox Results   Rest room air SAT % 98   Exercise room air SAT % 97                Alpha 1- M/M    Assessment and Plan {CC Problem List  Visit Diagnosis  ROS  Review (Popup)  Health Maintenance  Quality  BestPractice  Medications  SmartSets  SnapShot Encounters  Media      Diagnoses and all orders for this visit:    1. Stage 1 mild COPD by GOLD classification (Primary)  Comments:  we reviewed pft. albuterol as needed.    2. Centrilobular emphysema  Comments:  normal AAT as listed above.  Orders:  -      Spirometry with Diffusion Capacity & Lung Volumes    3. 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 CTS referral orders have been placed.    4. Current every day smoker  Comments:  cessation recommended        BMI is within normal parameters. No other follow-up for BMI required.      Plan as above. Office follow up in a few months or sooner if needed.     Marilyn Reyes, APRN  11/5/2024  14:50 CST    Follow Up {Instructions Charge Capture  Follow-up Communications   Return in about 3 months (around 2/5/2025).    Patient was given instructions and counseling regarding her condition or for health maintenance advice. Please see specific information pulled into the AVS if appropriate.

## 2024-11-05 ENCOUNTER — OFFICE VISIT (OUTPATIENT)
Dept: PULMONOLOGY | Facility: CLINIC | Age: 64
End: 2024-11-05
Payer: MEDICARE

## 2024-11-05 VITALS
OXYGEN SATURATION: 96 % | DIASTOLIC BLOOD PRESSURE: 72 MMHG | SYSTOLIC BLOOD PRESSURE: 100 MMHG | WEIGHT: 135 LBS | HEART RATE: 93 BPM | HEIGHT: 61 IN | BODY MASS INDEX: 25.49 KG/M2

## 2024-11-05 DIAGNOSIS — J43.2 CENTRILOBULAR EMPHYSEMA: Chronic | ICD-10-CM

## 2024-11-05 DIAGNOSIS — J43.2 CENTRILOBULAR EMPHYSEMA: Primary | ICD-10-CM

## 2024-11-05 DIAGNOSIS — C34.12 ADENOCARCINOMA OF UPPER LOBE OF LEFT LUNG: Chronic | ICD-10-CM

## 2024-11-05 DIAGNOSIS — F17.200 CURRENT EVERY DAY SMOKER: Chronic | ICD-10-CM

## 2024-11-05 DIAGNOSIS — J44.9 STAGE 1 MILD COPD BY GOLD CLASSIFICATION: Primary | Chronic | ICD-10-CM

## 2024-11-05 NOTE — PROCEDURES
Spirometry with Diffusion Capacity & Lung Volumes    Performed by: Christiano Kumar CMA  Authorized by: Marilyn Reyes, JAJA     Pre Drug % Predicted    FVC: 106%   FEV1: 87%   FEF 25-75%: 46%   FEV1/FVC: 66.44%   T%   RV: 191%   DLCO: 91%   D/VAsb: 71%    Interpretation   Spirometry   Spirometry shows mild obstruction. There is reduced midflow suggesting small airway/airflow obstruction.   Review of FVL curve   Patient's effort is normal.   Lung Volume Measurements  Measurements show elevated residual volume consistent with gas trapping and elevated TLC consistent with hyperventilation.   Diffusion Capacity  The patient's diffusion capacity is normal.  Diffusion capacity is normal when corrected for alveolar volume.

## 2024-11-21 ENCOUNTER — OFFICE VISIT (OUTPATIENT)
Dept: CARDIAC SURGERY | Facility: CLINIC | Age: 64
End: 2024-11-21
Payer: MEDICARE

## 2024-11-21 VITALS
SYSTOLIC BLOOD PRESSURE: 123 MMHG | OXYGEN SATURATION: 96 % | HEART RATE: 87 BPM | HEIGHT: 61 IN | DIASTOLIC BLOOD PRESSURE: 83 MMHG | WEIGHT: 136.2 LBS | BODY MASS INDEX: 25.71 KG/M2

## 2024-11-21 DIAGNOSIS — R91.1 LEFT UPPER LOBE PULMONARY NODULE: Primary | ICD-10-CM

## 2024-11-21 PROCEDURE — 1160F RVW MEDS BY RX/DR IN RCRD: CPT | Performed by: SURGERY

## 2024-11-21 PROCEDURE — 1159F MED LIST DOCD IN RCRD: CPT | Performed by: SURGERY

## 2024-11-21 PROCEDURE — 99204 OFFICE O/P NEW MOD 45 MIN: CPT | Performed by: SURGERY

## 2024-11-21 NOTE — PROGRESS NOTES
Thoracic Surgery Consultation    Referring Physician: Dr. Donovan Dewitt    Primary Care Physician: Elena Kamara    Chief Complaint   Patient presents with    Lung Cancer     New patient from Dr Dewitt          Subjective     History of Present Illness  The patient presents for evaluation of a spot on her lung. She is accompanied by her .    She reports no chest pain, breathing difficulties, or hemoptysis. A CT scan revealed clouding in her left lung, which was further investigated with a biopsy. The biopsy results indicated no lymph node involvement. A PET scan was also performed. She expresses a desire to avoid chemotherapy or radiation therapy.    She has a history of bronchitis and pneumonia from her childhood, which has resulted in some scarring. She has no history of heart, lung, or chest surgeries, nor any heart problems, strokes, or mini strokes. She was diagnosed with nonmelanoma skin cancer 10 years ago.    She is able to walk up a few flights of stairs without experiencing shortness of breath and maintains an active lifestyle.    SOCIAL HISTORY  She has been smoking since she was 14 years old. She smokes less than half a pack a day. She had quit smoking when Chantix first came out. Her  is also a smoker.      Review of Systems     A complete review of systems was performed, is negative except stated above.    Past Medical History:   Diagnosis Date    Bronchiectasis     GERD (gastroesophageal reflux disease)      Past Surgical History:   Procedure Laterality Date    BRONCHOSCOPY WITH ION ROBOTIC ASSIST N/A 10/28/2024    Procedure: BRONCHOSCOPY WITH ION ROBOT;  Surgeon: Donovan Dewitt MD;  Location: [facility];  Service: Robotics - Pulmonary;  Laterality: N/A;  preop; GALLO nodule   postop GALLO nodule   PCP Elena Fitch    CHOLECYSTECTOMY N/A     HYSTERECTOMY       Family History   Problem Relation Age of Onset    Cancer Mother     Asthma Sister      Social History     Tobacco Use  "   Smoking status: Every Day     Current packs/day: 0.50     Average packs/day: 0.5 packs/day for 47.9 years (23.9 ttl pk-yrs)     Types: Cigarettes     Start date: 1977     Passive exposure: Current    Smokeless tobacco: Never   Vaping Use    Vaping status: Never Used   Substance Use Topics    Alcohol use: Never    Drug use: Never     Current Outpatient Medications   Medication Sig Dispense Refill    Cholecalciferol 10 MCG (400 UNIT) tablet Take 1 tablet by mouth Daily.      hydrOXYzine (ATARAX) 10 MG tablet Take 1 tablet by mouth 3 (Three) Times a Day As Needed for Anxiety.      mirtazapine (REMERON SOL-TAB) 15 MG disintegrating tablet Place 1 tablet on the tongue Every Night.      multivitamin with minerals tablet tablet Take 1 tablet by mouth Daily.      vitamin C (ASCORBIC ACID) 250 MG tablet Take 1 tablet by mouth Daily.      zinc sulfate (ZINCATE) 220 (50 Zn) MG capsule Take 1 capsule by mouth Daily.       No current facility-administered medications for this visit.     Allergies:  Patient has no known allergies.    Objective      Vital Signs  Visit Vitals  /83   Pulse 87   Ht 153.7 cm (60.5\")   Wt 61.8 kg (136 lb 3.2 oz)   SpO2 96%   BMI 26.16 kg/m²         Physical Exam  Vitals reviewed.   Constitutional:       General: She is not in acute distress.     Appearance: She is well-developed. She is not diaphoretic.   HENT:      Head: Normocephalic and atraumatic.   Eyes:      General: No scleral icterus.     Pupils: Pupils are equal, round, and reactive to light.   Neck:      Vascular: No JVD.      Trachea: No tracheal deviation.   Cardiovascular:      Rate and Rhythm: Normal rate and regular rhythm.      Heart sounds: Normal heart sounds. No murmur heard.  Pulmonary:      Effort: Pulmonary effort is normal. No respiratory distress.      Breath sounds: Normal breath sounds. No stridor. No wheezing.   Abdominal:      General: There is no distension.      Palpations: Abdomen is soft.      Tenderness: There " "is no abdominal tenderness.   Musculoskeletal:         General: No deformity. Normal range of motion.      Cervical back: Normal range of motion and neck supple.   Skin:     General: Skin is warm and dry.      Capillary Refill: Capillary refill takes less than 2 seconds.      Coloration: Skin is not pale.      Findings: No erythema or rash.   Neurological:      Mental Status: She is alert and oriented to person, place, and time.      Cranial Nerves: No cranial nerve deficit.   Psychiatric:         Behavior: Behavior normal.         Thought Content: Thought content normal.         Judgment: Judgment normal.        Physical Exam      Results Review:     WBC   Date Value Ref Range Status   10/22/2024 7.01 3.40 - 10.80 10*3/mm3 Final     RBC   Date Value Ref Range Status   10/22/2024 4.67 3.77 - 5.28 10*6/mm3 Final     Hemoglobin   Date Value Ref Range Status   10/22/2024 13.6 12.0 - 15.9 g/dL Final     Hematocrit   Date Value Ref Range Status   10/22/2024 41.7 34.0 - 46.6 % Final     MCV   Date Value Ref Range Status   10/22/2024 89.3 79.0 - 97.0 fL Final     MCH   Date Value Ref Range Status   10/22/2024 29.1 26.6 - 33.0 pg Final     MCHC   Date Value Ref Range Status   10/22/2024 32.6 31.5 - 35.7 g/dL Final     RDW   Date Value Ref Range Status   10/22/2024 13.3 12.3 - 15.4 % Final     RDW-SD   Date Value Ref Range Status   10/22/2024 43.9 37.0 - 54.0 fl Final     MPV   Date Value Ref Range Status   10/22/2024 9.5 6.0 - 12.0 fL Final     Platelets   Date Value Ref Range Status   10/22/2024 213 140 - 450 10*3/mm3 Final     No results found for: \"GLUCOSE\", \"NA\", \"K\", \"CO2\", \"CL\", \"ANIONGAP\", \"CREATININE\", \"BUN\", \"BCR\", \"CALCIUM\", \"EGFRIFNONA\", \"ALKPHOS\", \"PROTEINTOT\", \"ALT\", \"AST\", \"BILITOT\", \"ALBUMIN\", \"GLOB\", \"LABIL2\"     I reviewed the patient's clinical results and discussed with patient.    Results  Path:  Final Diagnosis   1.  Nodule, left upper lobe lung, Ion biopsy (cellblock):  Non-small cell carcinoma, " adenocarcinoma consistent with lung primary.     2.  Nodule, left upper lobe lung, Ion fine-needle aspiration (smears and ThinPrep):  Non-small cell carcinoma, adenocarcinoma.     3.  Station 7 lymph node, EBUS (smears and ThinPrep):  No malignant cells identified.  Mixed lymphocytes and histiocytes consistent with lymph node.     4.  Bronchial washing (ThinPrep):  No malignant cells identified.  Benign bronchial epithelial cells, macrophages, and mixed inflammation present.     PFTs:    Spirometry with Diffusion Capacity & Lung Volumes     Performed by: Christiano Kumar CMA  Authorized by: Marilyn Reyes, JAJA     Pre Drug % Predicted    FVC: 106%   FEV1: 87%   FEF 25-75%: 46%   FEV1/FVC: 66.44%   T%   RV: 191%   DLCO: 91%   D/VAsb: 71%        PET Scan:  IMPRESSION:  1. The pleural-based nodule within the anterior LEFT upper lobe shows  unchanged size compared with the outside CT exam from 6 weeks ago.  2. While this could represent focal pneumonia the unchanged size over 6  weeks and the degree of FDG uptake is compatible with a primary lung  neoplasm.  3. No abnormal FDG uptake is seen outside of the chest.           This report was signed and finalized on 2024 10:30 AM by Dr. Joe Sanchez MD.    I personally reviewed CT of the chest the following is my interpretation:  There is an irregularly shaped anterior left upper lobe mass that abuts the anterior left parietal pleura just lateral to the sternum he does have emphysematous changes that are more upper lobe predominant I do not appreciate significant mediastinal adenopathy I am concerned for chest wall invasion in the midportion of the mass        Assessment & Plan     Assessment & Plan    Ms. Stack is a 64-year-old female she presents to me with incidentally found left upper lobe lung nodule there is now biopsied and proven to be adenocarcinoma.  She has a approximate 40-pack-year smoking history.  She has never had surgery on heart lungs or  chest.  She has not had any chest pain or pain with movement.  She is not short of breath with activity.  We did discuss smoking cessation for greater than 8 minutes, she is going to try to quit.    I reviewed her imaging and I am concerned somewhat of the chest wall invasion.  Given this we plan to get an MRI of the chest to further assess for this ensure that the portion abutting the visceral and parietal pleura is atelectasis and not tumor.  As long as no chest wall invasion she is a good candidate for robotic left upper lobectomy.  I discussed treatment options with her and we agree that this is the best treatment option for her.  I reviewed her PFTs and she has adequate lung reserve to tolerate anatomic lobectomy.  We discussed preoperative operative postoperative expectations as well as risk and benefits. We discussed the risk and benefits of surgery and alternatives including but not limited to bleeding, infection, injury to major vessels or organs, need for transfusion, need for conversion to open operation, pneumonia, prolonged airleak, risk of anesthesia, and/or death.  She understands these risk and agrees to proceed.    Will obtain MRI of the chest to assess for chest wall invasion if that looks okay plan on proceeding with surgery soon.  Thank you for trusting me the care of Ms. Stack.  Please do not hesitate to call questions or concerns.        Jaime Beltre MD   Cardiothoracic Surgeon      Patient or patient representative verbalized consent for the use of Ambient Listening during the visit with  Jaime Beltre MD for chart documentation. 11/21/2024  09:13 CST

## 2024-11-21 NOTE — LETTER
November 21, 2024     JAJA Daugherty  [address] [place] [address]    Patient: Deana Stack   YOB: 1960   Date of Visit: 11/21/2024       Dear JAJA Daugherty,    Deana Stack was in my office today. Below are the relevant portions of my assessment and plan of care.    Ms. Stack is a 64-year-old female she presents to me with incidentally found left upper lobe lung nodule there is now biopsied and proven to be adenocarcinoma.  She has a approximate 40-pack-year smoking history.  She has never had surgery on heart lungs or chest.  She has not had any chest pain or pain with movement.  She is not short of breath with activity.  We did discuss smoking cessation for greater than 8 minutes, she is going to try to quit.    I reviewed her imaging and I am concerned somewhat of the chest wall invasion.  Given this we plan to get an MRI of the chest to further assess for this ensure that the portion abutting the visceral and parietal pleura is atelectasis and not tumor.  As long as no chest wall invasion she is a good candidate for robotic left upper lobectomy.  I discussed treatment options with her and we agree that this is the best treatment option for her.  I reviewed her PFTs and she has adequate lung reserve to tolerate anatomic lobectomy.  We discussed preoperative operative postoperative expectations as well as risk and benefits. We discussed the risk and benefits of surgery and alternatives including but not limited to bleeding, infection, injury to major vessels or organs, need for transfusion, need for conversion to open operation, pneumonia, prolonged airleak, risk of anesthesia, and/or death.  She understands these risk and agrees to proceed.    Will obtain MRI of the chest to assess for chest wall invasion if that looks okay plan on proceeding with surgery soon.  Thank you for trusting me the care of Ms. Stack.  Please do not hesitate to call questions or  concerns.         Sincerely,        Jaime Beltre MD        CC: Donovan Dewitt MD

## 2024-12-02 ENCOUNTER — TELEPHONE (OUTPATIENT)
Dept: CARDIAC SURGERY | Facility: CLINIC | Age: 64
End: 2024-12-02
Payer: MEDICARE

## 2024-12-02 NOTE — TELEPHONE ENCOUNTER
If insurance has denied and is requesting peer to peer, this can be done however will likely not be done by the end of the day as Dr. Beltre and I are both in clinic.  If needed can we schedule MRI.  If insurance company is needing additional time to determine a decision, okay to reschedule MRI.

## 2024-12-02 NOTE — TELEPHONE ENCOUNTER
Gabrielle MORRIS, , calling today. Pt's MRI is scheduled for tomorrow. Insurance is still pending. She has submitted office note, XR Report, CT Report, Op note. If not approved she is asking if okay to reschedule? If not, Dr. Beltre can do P2P. Insurance Phone # [phone]. Tracking # [id number].    Will need to let Gabrielle know if ok to r/s or if Dr. Beltre will do P2P.

## 2024-12-03 NOTE — TELEPHONE ENCOUNTER
Called pt to advise that Dr Beltre is going to try to speak with the insurance company tomorrow to get this approved and once it is, I will get the MRI moved up and call her with this info. She voiced understanding.

## 2024-12-03 NOTE — TELEPHONE ENCOUNTER
I was not able to complete peer to peer today as her insurance company requires this to be scheduled.  For now this is scheduled for tomorrow 12/4/2024 at 1215.  Hopefully Dr. Beltre will be out of surgery by that time so he can complete this, otherwise I will do this on his behalf.  Please update patient and notify her that we will hopefully have more information tomorrow

## 2024-12-03 NOTE — TELEPHONE ENCOUNTER
Can someone check and get an update on insurance approval on this?  If [id number] does need to be completed, this can be done today.  Please advise patient that we are working to get her insurance to approve this and once it is approved, testing will need to be moved up.

## 2024-12-03 NOTE — TELEPHONE ENCOUNTER
Pt calling re: MRI.  States has been told ins has not approved this and testing has been resched for 1-8-25.  Pt thought that Dr Beltre had wanted to do her surgery this month.  She is calling to see if we know why this has been denied or what needs to be done re: this.  Can reach pt at #[phone]/jeovany

## 2024-12-04 NOTE — TELEPHONE ENCOUNTER
Patient notified. MRI scheduled for 12/6 @ [facility]. Patient aware of appt date/time and instructions.

## 2024-12-04 NOTE — TELEPHONE ENCOUNTER
I called and discussed with patient.  Peer to peer is scheduled for today at 1215.  Patient does confirm she is having sharp pain on the left side of her chest and this has been going on for some time.  I advised her I will let the physician reviewer that I speak with today know this.  I will call her after peer to peer is completed.  She verbalizes understanding and is appreciative.

## 2024-12-04 NOTE — TELEPHONE ENCOUNTER
Peer to peer completed and MRI chest has been approved.  I spoke with Dr. Tracy and she provided me Auth # [id number] and is valid from 11/26/2024 to 1/25/2025.  Please reschedule MRI to a sooner date and notify patient.

## 2024-12-04 NOTE — TELEPHONE ENCOUNTER
Pt called today stating that she has received a call this morning from our office. We have not called her. Dr. Beltre wants MRI completed this month. We will call her once this has been rescheduled. Belinda HULL voiced understanding and will relay this to pt.

## 2024-12-05 ENCOUNTER — HOSPITAL ENCOUNTER (OUTPATIENT)
Dept: MRI IMAGING | Facility: HOSPITAL | Age: 64
Discharge: HOME | End: 2024-12-05
Admitting: NURSE PRACTITIONER
Payer: MEDICARE

## 2024-12-05 DIAGNOSIS — R91.1 LEFT UPPER LOBE PULMONARY NODULE: ICD-10-CM

## 2024-12-05 PROCEDURE — 25510000001 GADOPICLENOL 0.5 MMOL/ML SOLUTION: Performed by: NURSE PRACTITIONER

## 2024-12-05 PROCEDURE — 71552 MRI CHEST W/O & W/DYE: CPT

## 2024-12-05 PROCEDURE — A9579 GAD-BASE MR CONTRAST NOS,1ML: HCPCS | Performed by: NURSE PRACTITIONER

## 2024-12-05 RX ADMIN — GADOPICLENOL 6 ML: 485.1 INJECTION INTRAVENOUS at 10:04

## 2024-12-06 DIAGNOSIS — C34.92 ADENOCARCINOMA, LUNG, LEFT: Primary | ICD-10-CM

## 2024-12-06 NOTE — PROGRESS NOTES
Dr. Beltre has reviewed imaging.  There is concern for chest wall invasion.  He recommends a referral to radiation oncology for radiation therapy and then reassess after completion to determine if surgery is an option.  I have called the patient and discussed this with her and she is agreeable.  Referral placed to Dr. Duong.

## 2024-12-09 LAB
FUNGUS WND CULT: NORMAL
MYCOBACTERIUM SPEC CULT: NORMAL
NIGHT BLUE STAIN TISS: NORMAL
NIGHT BLUE STAIN TISS: NORMAL

## 2024-12-11 PROBLEM — F17.200 CURRENT EVERY DAY SMOKER: Status: ACTIVE | Noted: 2024-12-11

## 2024-12-11 NOTE — PROGRESS NOTES
[facility]  Radiation Oncology Clinic   Sushant Collazo MD, FACR  Rei WILKINSON  _______________________________________________  [facility]  Department of Radiation Oncology  [address] [place] [id number]  Office: [phone]  Fax: [phone]    DATE: 12/16/2024  PATIENT: Deana Stack  1960                         MEDICAL RECORD #: [id number]    REASON FOR VISIT:    Chief Complaint   Patient presents with    Lung Cancer     1. Malignant neoplasm of upper lobe of left lung    2. Current every day smoker                                           REASON FOR VISIT:    Chief Complaint   Patient presents with    Lung Cancer     Deana Stack is a 64 y.o. female that has been referred to our clinic to be evaluated for consideration of preoperative chemoradiotherapy to the left lung/chest wall.    On presentation today he reports fatigue, cough, and headaches. Denies appetite change, unexpected weight change, nasuea/vomiting, diarrhea, light-headedness, weakness, and headaches. She continues to follow .    HISTORY OF PRESENT ILLNESS:  Patient stated that she presented for routine PCP visit for which a low-dose screening CT was ordered.  Findings, evaluation and workup is summarized below:    07/31/2024 - CT Chest Low Dose:  Emphysematous changes with a peripheral area of consolidation in the medial aspect of the left upper lobe. This could be secondary to scarring. Comparison with any prior exams would be beneficial to assist stability. If none are available, followed-up PET/CT should be considered for more complete characterization.    09/16/2024 - PET Scan:  The pleural-based nodule within the anterior LEFT upper lobe shows unchanged size compared with the outside CT exam from 6 weeks ago.  While this could represent focal pneumonia the unchanged size over 6 weeks and the degree of FDG uptake is compatible with a primary lung  neoplasm.  No abnormal FDG uptake is seen outside of the chest.    10/14/2024 - Appointment with JAJA Deluna:  Left upper lobe pulmonary nodule (Primary)  schedule for Ion robotic bronchoscopy with Dr. Dewitt. Risks and benefits discussed. She is not on any blood thinners.  Follow up:  Plan as above. Office follow up in a few weeks post procedure. Call sooner if needed.     10/22/2024 - CT Chest without contrast:  Stable irregular 2.6 x 1.3 cm anterior subpleural left upper lobe pulmonary nodule, hypermetabolic on PET/CT of 2024 concerning for malignancy. No pathologic intrathoracic or axillary lymphadenopathy.  Moderate emphysema.     10/28/2024 - Bronchoscopy with biopsy per :  Nodule, left upper lobe lung, Ion biopsy (cellblock):  Non-small cell carcinoma, adenocarcinoma consistent with lung primary.  Nodule, left upper lobe lung, Ion fine-needle aspiration (smears and ThinPrep):  Non-small cell carcinoma, adenocarcinoma.  Station 7 lymph node, EBUS (smears and ThinPrep):  No malignant cells identified.  Mixed lymphocytes and histiocytes consistent with lymph node.  Bronchial washing (ThinPrep):  No malignant cells identified.  Benign bronchial epithelial cells, macrophages, and mixed inflammation present.    2024 - Appointment with JAJA Deluna:  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  We reviewed cytology from recent ion. CTS referral orders have been placed.   Follow up:  Follow up in 3 months     2024 - Pulmonary Function Tests:  Pre Drug % Predicted   FVC: 106%  FEV1: 87%  FEF 25-75%: 46%  FEV1/FVC: 66.44%  T%  RV: 191%  DLCO: 91%  D/VAsb: 71%     Interpretation Spirometry   Spirometry shows mild obstruction. There is reduced midflow suggesting small airway/airflow obstruction.   Review of FVL curve   Patient's effort is normal.   Lung Volume Measurements  Measurements show elevated residual volume consistent with gas trapping and elevated TLC consistent with  hyperventilation.   Diffusion Capacity  The patient's diffusion capacity is normal.  Diffusion capacity is normal when corrected for alveolar volume.     11/21/2024 - Appointment with Dr. Beltre:  I reviewed her imaging and I am concerned somewhat of the chest wall invasion.    Given this we plan to get an MRI of the chest to further assess for this ensure that the portion abutting the visceral and parietal pleura is atelectasis and not tumor.    As long as no chest wall invasion she is a good candidate for robotic left upper lobectomy.  I discussed treatment options with her and we agree that this is the best treatment option for her.  I reviewed her PFTs and she has adequate lung reserve to tolerate anatomic lobectomy.    We discussed preoperative operative postoperative expectations as well as risk and benefits. We discussed the risk and benefits of surgery and alternatives including but not limited to bleeding, infection, injury to major vessels or organs, need for transfusion, need for conversion to open operation, pneumonia, prolonged airleak, risk of anesthesia, and/or death.  She understands these risk and agrees to proceed.  Will obtain MRI of the chest to assess for chest wall invasion if that looks okay plan on proceeding with surgery soon.    Thank you for trusting me the care of Ms. Stack.  Please do not hesitate to call questions or concerns.     12/05/2024 - MRI Chest with and without contrast:  Redemonstrated left upper lobe lung mass. There is greater than 3 cm tumor contact with the chest wall and there is some loss of the extrapleural fat plane along the anterolateral aspect of the mass. Findings are suspicious for chest wall invasion, though not definitive.    12/06/2024 - Documentation by Dr. Beltre's office:  Dr. Beltre has reviewed imaging.  There is concern for chest wall invasion.  He recommends a referral to radiation oncology for radiation therapy and then reassess after completion to determine  if surgery is an option.  I have called the patient and discussed this with her and she is agreeable.  Referral placed to radiation oncology.      History obtained from  PATIENT, FAMILY, and CHART    PAST MEDICAL HISTORY  Past Medical History:   Diagnosis Date    Bronchiectasis     GERD (gastroesophageal reflux disease)     Lung cancer       PAST SURGICAL HISTORY  Past Surgical History:   Procedure Laterality Date    BRONCHOSCOPY WITH ION ROBOTIC ASSIST N/A 10/28/2024    Procedure: BRONCHOSCOPY WITH ION ROBOT;  Surgeon: Donovan Dewitt MD;  Location: [facility] OR;  Service: Robotics - Pulmonary;  Laterality: N/A;  preop; GALLO nodule   postop GALLO nodule   PCP Elena Fitch    CHOLECYSTECTOMY N/A     HYSTERECTOMY        FAMILY HISTORY  family history includes Asthma in her sister; Cancer in her mother.    SOCIAL HISTORY  Social History     Tobacco Use    Smoking status: Every Day     Current packs/day: 0.50     Average packs/day: 0.5 packs/day for 48.0 years (24.0 ttl pk-yrs)     Types: Cigarettes     Start date: 1977     Passive exposure: Current    Smokeless tobacco: Never   Vaping Use    Vaping status: Never Used   Substance Use Topics    Alcohol use: Never    Drug use: Never     ALLERGIES  Patient has no known allergies.     MEDICATIONS    Current Outpatient Medications:     buPROPion SR (WELLBUTRIN SR) 150 MG 12 hr tablet, Take 1 tablet by mouth Daily., Disp: , Rfl:     Cholecalciferol 10 MCG (400 UNIT) tablet, Take 1 tablet by mouth Daily., Disp: , Rfl:     hydrOXYzine (ATARAX) 10 MG tablet, Take 1 tablet by mouth 3 (Three) Times a Day As Needed for Anxiety., Disp: , Rfl:     mirtazapine (REMERON SOL-TAB) 15 MG disintegrating tablet, Place 1 tablet on the tongue Every Night., Disp: , Rfl:     multivitamin with minerals tablet tablet, Take 1 tablet by mouth Daily., Disp: , Rfl:     vitamin C (ASCORBIC ACID) 250 MG tablet, Take 1 tablet by mouth Daily., Disp: , Rfl:     zinc sulfate (ZINCATE) 220 (50 Zn) MG  "capsule, Take 1 capsule by mouth Daily., Disp: , Rfl:     The following portions of the patient's history were reviewed and updated as appropriate: allergies, current medications, past family history, past medical history, past social history, past surgical history and problem list.    REVIEW OF SYSTEMS  Review of Systems   Constitutional:  Positive for fatigue.   Eyes:         Glasses    Respiratory:  Positive for cough (sinus drainage).    Cardiovascular: Negative.    Gastrointestinal: Negative.    Endocrine: Negative.    Genitourinary: Negative.     Musculoskeletal: Negative.    Skin: Negative.    Neurological:  Positive for headaches (\"pressure changes\").   Hematological: Negative.    Psychiatric/Behavioral: Negative.       I have reviewed and confirmed the accuracy of the ROS as documented by the MA/LPN/RN Mark Anthony Collazo MD    PHYSICAL EXAM  VITAL SIGNS:   Vitals:    12/16/24 0957   BP: 132/70   Pulse: 85   SpO2: 98%  Comment: room air   Weight: 62.6 kg (138 lb 1.6 oz)   Height: 153.7 cm (60.5\")   PainSc: 0-No pain     Physical Exam  Vitals and nursing note reviewed. Exam conducted with a chaperone present.   Constitutional:       General: She is not in acute distress.     Appearance: Normal appearance. She is normal weight.   HENT:      Head: Normocephalic.      Mouth/Throat:      Mouth: Mucous membranes are moist.      Pharynx: Oropharynx is clear.   Eyes:      Extraocular Movements: Extraocular movements intact.      Pupils: Pupils are equal, round, and reactive to light.   Cardiovascular:      Rate and Rhythm: Normal rate and regular rhythm.      Heart sounds: Normal heart sounds.   Pulmonary:      Effort: Pulmonary effort is normal. No respiratory distress.      Breath sounds: Normal breath sounds. No wheezing or rales.   Abdominal:      General: There is no distension.      Palpations: Abdomen is soft. There is no mass.      Tenderness: There is no abdominal tenderness.   Musculoskeletal:         " General: Normal range of motion.      Cervical back: Normal range of motion.      Right lower leg: No edema.      Left lower leg: No edema.   Lymphadenopathy:      Cervical: No cervical adenopathy.   Neurological:      General: No focal deficit present.      Mental Status: She is alert and oriented to person, place, and time.      Cranial Nerves: No cranial nerve deficit.      Gait: Gait normal.   Psychiatric:         Mood and Affect: Mood normal.         Behavior: Behavior normal.         Thought Content: Thought content normal.         Judgment: Judgment normal.          Performance Status: ECOG (0) Fully active, able to carry on all predisease performance without restriction    Clinical Quality Measures  - Pain Documented by Standardized Tool, FPS Deana Stack reports a pain score of 0. Given her pain assessment as noted, treatment options were discussed and the following options were decided upon as a follow-up plan to address the patient's pain:  No pain, no plan given .  Pain Medications               buPROPion SR (WELLBUTRIN SR) 150 MG 12 hr tablet Take 1 tablet by mouth Daily.    mirtazapine (REMERON SOL-TAB) 15 MG disintegrating tablet Place 1 tablet on the tongue Every Night.          - Body Mass Index Screening and Follow-Up Plan  BMI is >= 25 and <30. (Overweight) The following options were offered after discussion;: none (medical contraindication)    - Tobacco Use: Screening and Cessation Intervention  Social History    Tobacco Use      Smoking status: Every Day        Packs/day: 0.50        Years: 0.5 packs/day for 48.0 years (24.0 ttl pk-yrs)        Types: Cigarettes        Start date: 1977        Passive exposure: Current      Smokeless tobacco: Never    Smoking cessation information given in after visit summary.    - Advanced Care Planning Advance Care Planning   ACP discussion was held with the patient during this visit. Patient does not have an advance directive, information provided.    -  PHQ-2 Depression Screening:  Little interest or pleasure in doing things? Not at all   Feeling down, depressed, or hopeless? Not at all   PHQ-2 Total Score 0     ASSESSMENT AND PLAN  1. Malignant neoplasm of upper lobe of left lung    2. Current every day smoker      Orders Placed This Encounter   Procedures    Ambulatory Referral to Oncology     RECOMMENDATIONS: Deana Stack is a 64-year-old female with a good performance status that has an incidentally diagnosed clinical stage IIB (T3 N0 M0) non-small cell carcinoma of the left lung consistent with adenocarcinoma.  She has had a surgical evaluation with recommendations of preoperative chemoradiation therapy in an effort to have an R0 resection for definitive therapy.  I agree with these recommendations and that the patient is a candidate.  We will plan for preoperative chemoradiation therapy per NCCN guidelines.    The indications and rationale of lung external beam radiation therapy according to the NCCN Guidelines has been discussed today with the patient and her friend who accompanied her. I have extensively reviewed the risks, benefits and alternatives of therapy with this diagnosis. The risks of radiation therapy includes but is not limited to radiation induced pulmonary fibrosis, progression of disease in spite of therapy with either local or systemic failure. I have seen, examined and reviewed this patient's medication list, appropriate labs and imaging studies as well as other physician notes. We discussed the goals and plans of care with the patient and family and answered all questions.     Pulmonary function tests have been reviewed.     Following this discussion and in consideration of the diagnostic data/evaluation of the patient, I recommended a course of external beam radiation, likely delivered concurrently with systemic therapy under the medical oncology service, I anticipate  preoperative chemoradiation therapy using IMRT/IGRT techniques to a  total dose of 5400 cGy over 5-6 weeks, final course pending.. Continue ongoing management per primary care physician and other specialists.     Deana Stack  reports that she has been smoking cigarettes. She started smoking about 47 years ago. She has a 24 pack-year smoking history. She has been exposed to tobacco smoke. She has never used smokeless tobacco. I have educated her on the risk of diseases from using tobacco products such as cancer, COPD, and heart disease. I spent >10 minutes counseling the patient with request that she make the best effort she can to discontinue smoking.    Thank you for allowing me to assist in this patients care.     Patient Instructions   Follow-up with referral to medical oncology to consider preop concurrent chemoradiation.    Return today (on 12/16/2024) for CT Simulation .     Time Spent: I spent 60 minutes caring for Deana on this date of service. This time includes time spent by me in the following activities: preparing for the visit, reviewing tests, obtaining and/or reviewing a separately obtained history, performing a medically appropriate examination and/or evaluation, counseling and educating the patient/family/caregiver, ordering medications, tests, or procedures, referring and communicating with other health care professionals, documenting information in the medical record, independently interpreting results and communicating that information with the patient/family/caregiver, and care coordination.   Mark Anthony Collazo MD  12/16/2024

## 2024-12-12 ENCOUNTER — HOSPITAL ENCOUNTER (OUTPATIENT)
Dept: RADIATION ONCOLOGY | Facility: HOSPITAL | Age: 64
Setting detail: RADIATION/ONCOLOGY SERIES
End: 2024-12-12
Payer: MEDICARE

## 2024-12-16 ENCOUNTER — CONSULT (OUTPATIENT)
Age: 64
End: 2024-12-16
Payer: MEDICARE

## 2024-12-16 ENCOUNTER — HOSPITAL ENCOUNTER (OUTPATIENT)
Dept: RADIATION ONCOLOGY | Facility: HOSPITAL | Age: 64
Setting detail: RADIATION/ONCOLOGY SERIES
Discharge: HOME | End: 2024-12-16
Payer: MEDICARE

## 2024-12-16 VITALS
BODY MASS INDEX: 26.07 KG/M2 | SYSTOLIC BLOOD PRESSURE: 132 MMHG | HEART RATE: 85 BPM | WEIGHT: 138.1 LBS | DIASTOLIC BLOOD PRESSURE: 70 MMHG | HEIGHT: 61 IN | OXYGEN SATURATION: 98 %

## 2024-12-16 DIAGNOSIS — C34.12 MALIGNANT NEOPLASM OF UPPER LOBE OF LEFT LUNG: Primary | ICD-10-CM

## 2024-12-16 DIAGNOSIS — F17.200 CURRENT EVERY DAY SMOKER: ICD-10-CM

## 2024-12-16 PROCEDURE — 77334 RADIATION TREATMENT AID(S): CPT | Performed by: RADIOLOGY

## 2024-12-16 PROCEDURE — G0463 HOSPITAL OUTPT CLINIC VISIT: HCPCS | Performed by: RADIOLOGY

## 2024-12-16 PROCEDURE — 77263 THER RADIOLOGY TX PLNG CPLX: CPT | Performed by: RADIOLOGY

## 2024-12-16 RX ORDER — BUPROPION HYDROCHLORIDE 150 MG/1
150 TABLET, EXTENDED RELEASE ORAL EVERY 24 HOURS
COMMUNITY
Start: 2024-11-22

## 2024-12-23 PROCEDURE — 77300 RADIATION THERAPY DOSE PLAN: CPT | Performed by: RADIOLOGY

## 2024-12-23 PROCEDURE — 77293 RESPIRATOR MOTION MGMT SIMUL: CPT | Performed by: RADIOLOGY

## 2024-12-23 PROCEDURE — 77338 DESIGN MLC DEVICE FOR IMRT: CPT | Performed by: RADIOLOGY

## 2024-12-23 PROCEDURE — 77301 RADIOTHERAPY DOSE PLAN IMRT: CPT | Performed by: RADIOLOGY

## 2024-12-25 NOTE — PROGRESS NOTES
[facility] HEMATOLOGY & ONCOLOGY  [address]  [place] [id number] [phone]    Patient Name: Deana Stack  Encounter Date: 01/02/2025  YOB: 1960  Patient Number: [id number]    REASON FOR CONSULTATION:   Adenocarcinoma upper lobe of left lung    HISTORY OF PRESENT ILLNESS:  Deana Stack is a 64 yoF with bronchiectasis, GERD, current everyday smoker, and recently diagnosed lung cancer.    07/31/2024 - CT Chest Low Dose:  Emphysematous changes with a peripheral area of consolidation in the medial aspect of the left upper lobe. This could be secondary to scarring. Comparison with any prior exams would be beneficial to assist stability. If none are available, followed-up PET/CT should be considered for more complete characterization.     09/16/2024 - PET Scan:  The pleural-based nodule within the anterior LEFT upper lobe shows unchanged size compared with the outside CT exam from 6 weeks ago.  While this could represent focal pneumonia the unchanged size over 6 weeks and the degree of FDG uptake is compatible with a primary lung neoplasm.  No abnormal FDG uptake is seen outside of the chest.     10/14/2024 - Appointment with JAJA Deluna:  Left upper lobe pulmonary nodule (Primary)  schedule for Ion robotic bronchoscopy with Dr. Dewitt. Risks and benefits discussed. She is not on any blood thinners.  Follow up:  Plan as above. Office follow up in a few weeks post procedure. Call sooner if needed.      10/22/2024 - CT Chest without contrast:  Stable irregular 2.6 x 1.3 cm anterior subpleural left upper lobe pulmonary nodule, hypermetabolic on PET/CT of 9/16/2024 concerning for malignancy. No pathologic intrathoracic or axillary lymphadenopathy.  Moderate emphysema.      10/28/2024 - Bronchoscopy with biopsy per :  Nodule, left upper lobe lung, Ion biopsy (cellblock):  Non-small cell carcinoma, adenocarcinoma  consistent with lung primary.  Nodule, left upper lobe lung, Ion fine-needle aspiration (smears and ThinPrep):  Non-small cell carcinoma, adenocarcinoma.  Station 7 lymph node, EBUS (smears and ThinPrep):  No malignant cells identified.  Mixed lymphocytes and histiocytes consistent with lymph node.  Bronchial washing (ThinPrep):  No malignant cells identified.  Benign bronchial epithelial cells, macrophages, and mixed inflammation present.     2024 - Appointment with JAJA Deluna:  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  We reviewed cytology from recent ion. CTS referral orders have been placed.   Follow up:  Follow up in 3 months      2024 - Pulmonary Function Tests:  Pre Drug % Predicted   FVC: 106%  FEV1: 87%  FEF 25-75%: 46%  FEV1/FVC: 66.44%  T%  RV: 191%  DLCO: 91%  D/VAsb: 71%     Interpretation Spirometry   Spirometry shows mild obstruction. There is reduced midflow suggesting small airway/airflow obstruction.   Review of FVL curve   Patient's effort is normal.   Lung Volume Measurements  Measurements show elevated residual volume consistent with gas trapping and elevated TLC consistent with hyperventilation.   Diffusion Capacity  The patient's diffusion capacity is normal.  Diffusion capacity is normal when corrected for alveolar volume.      2024 - Appointment with Dr. Beltre:  I reviewed her imaging and I am concerned somewhat of the chest wall invasion.    Given this we plan to get an MRI of the chest to further assess for this ensure that the portion abutting the visceral and parietal pleura is atelectasis and not tumor.    As long as no chest wall invasion she is a good candidate for robotic left upper lobectomy.  I discussed treatment options with her and we agree that this is the best treatment option for her.  I reviewed her PFTs and she has adequate lung reserve to tolerate anatomic lobectomy.    We discussed preoperative operative postoperative expectations as well  as risk and benefits. We discussed the risk and benefits of surgery and alternatives including but not limited to bleeding, infection, injury to major vessels or organs, need for transfusion, need for conversion to open operation, pneumonia, prolonged airleak, risk of anesthesia, and/or death.  She understands these risk and agrees to proceed.  Will obtain MRI of the chest to assess for chest wall invasion if that looks okay plan on proceeding with surgery soon.    Thank you for trusting me the care of Ms. Stack.  Please do not hesitate to call questions or concerns.     12/05/2024 - MRI Chest with and without contrast:  Redemonstrated left upper lobe lung mass. There is greater than 3 cm tumor contact with the chest wall and there is some loss of the extrapleural fat plane along the anterolateral aspect of the mass. Findings are suspicious for chest wall invasion, though not definitive.     12/06/2024 - Documentation by Dr. Beltre's office:  Dr. Beltre has reviewed imaging.  There is concern for chest wall invasion.  He recommends a referral to radiation oncology for radiation therapy and then reassess after completion to determine if surgery is an option.  I have called the patient and discussed this with her and she is agreeable.  Referral placed to radiation oncology.     12/16/2024 - Consult Dr. Collazo:  Deana Stack is a 64-year-old female with a good performance status that has an incidentally diagnosed clinical stage IIB (T3 N0 M0) non-small cell carcinoma of the left lung consistent with adenocarcinoma.  She has had a surgical evaluation with recommendations of preoperative chemoradiation therapy in an effort to have an R0 resection for definitive therapy.  I agree with these recommendations and that the patient is a candidate.  We will plan for preoperative chemoradiation therapy per NCCN guidelines.  I anticipate  preoperative chemoradiation therapy using IMRT/IGRT techniques to a total dose of 5400 cGy  over 5-6 weeks, final course pending.     01/02/2024 - Patient is seen for preoperative systemic therapy considerations:    I have reviewed the HPI and verified with the patient the accuracy of it. No changes to interval history since the information was documented. Mitchel Tello MD 01/02/25        LABS    Lab Results - Last 18 Months   Lab Units 10/22/24  1016   HEMOGLOBIN g/dL 13.6   HEMATOCRIT % 41.7   MCV fL 89.3   WBC 10*3/mm3 7.01   RDW % 13.3   MPV fL 9.5   PLATELETS 10*3/mm3 213       PAST MEDICAL HISTORY:  ALLERGIES:  No Known Allergies  CURRENT MEDICATIONS:  Outpatient Encounter Medications as of 1/2/2025   Medication Sig Dispense Refill    buPROPion SR (WELLBUTRIN SR) 150 MG 12 hr tablet Take 1 tablet by mouth Daily.      Cholecalciferol 10 MCG (400 UNIT) tablet Take 1 tablet by mouth Daily.      hydrOXYzine (ATARAX) 10 MG tablet Take 1 tablet by mouth 3 (Three) Times a Day As Needed for Anxiety.      mirtazapine (REMERON SOL-TAB) 15 MG disintegrating tablet Place 1 tablet on the tongue Every Night.      multivitamin with minerals tablet tablet Take 1 tablet by mouth Daily.      vitamin C (ASCORBIC ACID) 250 MG tablet Take 1 tablet by mouth Daily.      zinc sulfate (ZINCATE) 220 (50 Zn) MG capsule Take 1 capsule by mouth Daily.       No facility-administered encounter medications on file as of 1/2/2025.     ADULT ILLNESSES:  Patient Active Problem List   Diagnosis Code    Malignant neoplasm of upper lobe of left lung C34.12    Current every day smoker F17.200     SURGERIES:  Past Surgical History:   Procedure Laterality Date    BRONCHOSCOPY WITH ION ROBOTIC ASSIST N/A 10/28/2024    Procedure: BRONCHOSCOPY WITH ION ROBOT;  Surgeon: oDnovan Dewitt MD;  [facility]: [facility];  Service: Robotics - Pulmonary;  Laterality: N/A;  preop; GALLO nodule   postop GALLO nodule   PCP Elena Fitch    CHOLECYSTECTOMY N/A     HYSTERECTOMY       HEALTH MAINTENANCE ITEMS:  Health Maintenance Due   Topic Date Due  "   DXA SCAN  Never done    BMI FOLLOWUP  Never done    COLORECTAL CANCER SCREENING  Never done    TDAP/TD VACCINES (1 - Tdap) Never done    ZOSTER VACCINE (1 of 2) Never done    MAMMOGRAM  Never done    COVID-19 Vaccine (3 - Moderna risk series) 04/28/2021    INFLUENZA VACCINE  07/01/2024    HEPATITIS C SCREENING  Never done    ANNUAL WELLNESS VISIT  Never done       <no information>  Last Completed Colonoscopy       This patient has no relevant Health Maintenance data.          Immunization History   Administered Date(s) Administered    COVID-19 (MODERNA) 1st,2nd,3rd Dose Monovalent 03/03/2021, 03/31/2021    Fluzone (or Fluarix & Flulaval for VFC) >6mos 12/01/2015    Pneumococcal Conjugate 20-Valent (PCV20) 06/28/2024     Last Completed Mammogram       This patient has no relevant Health Maintenance data.              FAMILY HISTORY:  Family History   Problem Relation Age of Onset    Cancer Mother     Asthma Sister      SOCIAL HISTORY:  Social History     Socioeconomic History    Marital status:    Tobacco Use    Smoking status: Every Day     Current packs/day: 0.50     Average packs/day: 0.5 packs/day for 48.0 years (24.0 ttl pk-yrs)     Types: Cigarettes     Start date: 1977     Passive exposure: Current    Smokeless tobacco: Never   Vaping Use    Vaping status: Never Used   Substance and Sexual Activity    Alcohol use: Never    Drug use: Never       REVIEW OF SYSTEMS:  Review of Systems   Constitutional:  Negative for fatigue and unexpected weight loss.        Manages her ADLs to include chores, errands and driving.  Is up and about, \"most of the time.\"   HENT:  Positive for postnasal drip.    Eyes: Negative.    Respiratory: Negative.  Negative for cough and shortness of breath.         Current cigarette smoker-age 16-present.  Up to 1 pack/day.  States that currently she is smoking, \"2 cigarettes a day at the most\".  Is on Wellbutrin.   Cardiovascular: Negative.    Gastrointestinal: Negative.  " "  Endocrine: Negative.    Genitourinary: Negative.    Musculoskeletal: Negative.    Skin: Negative.    Allergic/Immunologic: Negative.    Neurological: Negative.    Hematological: Negative.    Psychiatric/Behavioral: Negative.         /80   Pulse 83   Temp 98.4 °F (36.9 °C) (Temporal)   Resp 18   Ht 153.7 cm (60.5\")   Wt 62.1 kg (136 lb 14.4 oz)   SpO2 98%   BMI 26.30 kg/m²  Body surface area is 1.6 meters squared.  Pain Score    01/02/25 1122   PainSc: 0-No pain       Physical Exam:  Physical Exam  Vitals and nursing note reviewed.   Constitutional:       Comments: Pleasant, cooperative, slender middle-aged female.  Ambulatory.  ECOG 0.  She is accompanied by her spouse, Feli ADKINS:      Head: Normocephalic and atraumatic.   Eyes:      General: No scleral icterus.     Extraocular Movements: Extraocular movements intact.      Pupils: Pupils are equal, round, and reactive to light.   Cardiovascular:      Rate and Rhythm: Normal rate.   Pulmonary:      Effort: Pulmonary effort is normal.      Breath sounds: No wheezing, rhonchi or rales.      Comments: Distant breath sounds bilaterally.  Abdominal:      Palpations: Abdomen is soft.      Tenderness: There is no abdominal tenderness.   Musculoskeletal:         General: Normal range of motion.      Cervical back: Normal range of motion.   Lymphadenopathy:      Cervical: No cervical adenopathy.   Skin:     General: Skin is warm.   Neurological:      General: No focal deficit present.      Mental Status: She is alert and oriented to person, place, and time.   Psychiatric:         Mood and Affect: Mood normal.         Behavior: Behavior normal.         Thought Content: Thought content normal.         .Assessment:  1.   Adenocarcinoma of the upper lobe of the left lung.  --Original tumor stage: IIB (cT3, cN0, cM0)   --Original tumor burden:  -07/31/2024 - CT Chest Low Dose:  Emphysematous changes with a peripheral area of consolidation in the medial aspect of " the left upper lobe. This could be secondary to scarring. Comparison with any prior exams would be beneficial to assist stability. If none are available, followed-up PET/CT should be considered for more complete characterization.     -09/16/2024 - PET Scan:  The pleural-based nodule within the anterior LEFT upper lobe shows unchanged size compared with the outside CT exam from 6 weeks ago.  While this could represent focal pneumonia the unchanged size over 6 weeks and the degree of FDG uptake is compatible with a primary lung neoplasm.  No abnormal FDG uptake is seen outside of the chest.  -10/22/2024 - CT Chest without contrast:  Stable irregular 2.6 x 1.3 cm anterior subpleural left upper lobe pulmonary nodule, hypermetabolic on PET/CT of 9/16/2024 concerning for malignancy. No pathologic intrathoracic or axillary lymphadenopathy.  Moderate emphysema.      -10/28/2024 - Bronchoscopy with biopsy per :  Nodule, left upper lobe lung, Ion biopsy (cellblock):  Non-small cell carcinoma, adenocarcinoma consistent with lung primary.  Nodule, left upper lobe lung, Ion fine-needle aspiration (smears and ThinPrep):  Non-small cell carcinoma, adenocarcinoma.  Station 7 lymph node, EBUS (smears and ThinPrep):  No malignant cells identified.  Mixed lymphocytes and histiocytes consistent with lymph node.  Bronchial washing (ThinPrep):  No malignant cells identified.  Benign bronchial epithelial cells, macrophages, and mixed inflammation present.  -11/21/2024 - Appointment with Dr. Beltre:  I reviewed her imaging and I am concerned somewhat of the chest wall invasion.    Given this we plan to get an MRI of the chest to further assess for this ensure that the portion abutting the visceral and parietal pleura is atelectasis and not tumor.    As long as no chest wall invasion she is a good candidate for robotic left upper lobectomy.  I discussed treatment options with her and we agree that this is the best treatment option for  her.  I reviewed her PFTs and she has adequate lung reserve to tolerate anatomic lobectomy.    We discussed preoperative operative postoperative expectations as well as risk and benefits. We discussed the risk and benefits of surgery and alternatives including but not limited to bleeding, infection, injury to major vessels or organs, need for transfusion, need for conversion to open operation, pneumonia, prolonged airleak, risk of anesthesia, and/or death.  She understands these risk and agrees to proceed.  Will obtain MRI of the chest to assess for chest wall invasion if that looks okay plan on proceeding with surgery soon.    -12/05/2024 - MRI Chest with and without contrast:  Redemonstrated left upper lobe lung mass. There is greater than 3 cm tumor contact with the chest wall and there is some loss of the extrapleural fat plane along the anterolateral aspect of the mass. Findings are suspicious for chest wall invasion, though not definitive.  -12/06/2024 - Documentation by Dr. Beltre's office:  Dr. Beltre has reviewed imaging.  There is concern for chest wall invasion.  He recommends a referral to radiation oncology for radiation therapy and then reassess after completion to determine if surgery is an option.  I have called the patient and discussed this with her and she is agreeable.  Referral placed to radiation oncology.     --Complications of tumor:  None. Incidental finding    --Tumor status:  Untreated.    -12/16/2024 - Consult Dr. Collazo:  Deana Stack is a 64-year-old female with a good performance status that has an incidentally diagnosed clinical stage IIB (T3 N0 M0) non-small cell carcinoma of the left lung consistent with adenocarcinoma.  She has had a surgical evaluation with recommendations of preoperative chemoradiation therapy in an effort to have an R0 resection for definitive therapy.  I agree with these recommendations and that the patient is a candidate.  We will plan for preoperative  "chemoradiation therapy per NCCN guidelines.  I anticipate  preoperative chemoradiation therapy using IMRT/IGRT techniques to a total dose of 5400 cGy over 5-6 weeks, final course pending.     2.   Current tobacco smoker-24 pack years.  \"Down to 2 cigarettes a day\".  3.   COPD  4.   Bronchiectasis    Plan:  1.   Review available information as it pertains to the lung neoplasm (above), including CT chest, PET scan, MRI and Ion bronchoscopy showing adenocarcinoma upper lobe of left lung  2.   Send biopsy specimen for [facility] lung panel (PD-L1, ALK, ROS1, EGFR, BRAF, MEK, NTRK, etc)  3.   Schedule brain MRI  4.   Review consults from Dr. Beltre (CT surgery) and Dr. Collazo (radiation oncology).  She has had a surgical evaluation with recommendations of preoperative chemoradiation therapy in an effort to have an R0 resection for definitive therapy.  We will plan for preoperative chemoradiation therapy per NCCN guidelines.  I anticipate  preoperative chemoradiation therapy using IMRT/IGRT techniques to a total dose of 5400 cGy over 5-6 weeks,  5.   Draw baseline CBC w diff, fe, fe sat, ferritin, B12, folate, CEA, CMP  6.   Discussed with Dr. Collazo.  Will plan on concurrent neoadjuvant chemo+IO followed by surgery  7.   NCCN guidelines 1.2025 -non-small cell lung cancer- clinical presentation: Chest wall, proximal airway or mediastinum (T3 invasion, N0-1; resectable; T4 invasion, N0-1)-initial treatment- surgery or preoperative systemic therapy or concurrent chemoradiation - surgical reevaluation (including chest CT+/- PET scan). Adjuvant treatment: Chemotherapy+ atezolizumab or pembrolizumab or osimertinib if R0 (margins negative).  If margins positive: Reresection+ chemotherapy or chemoradiation (R1/R2).  Resection+ RT boost then adjuvant systemic therapy- surveillance     8.   Teaching sheets for carboplatin, paclitaxel     9.     Re: Discussed the potential toxicities of paclitaxel (to include but not limited to: " Myelosuppression, opportunistic infection, neuropathy, hypersensitivity reaction, anaphylaxis, cardiac conduction disturbance, bradycardia, arrhythmias, hypothyroidism, syncope, hypertension, thromboembolism, myocardial infarction, severe injection site reaction, pulmonary toxicity, neurotoxicity, GI obstruction/perforation, paralytic ileus, ischemic colitis, pancreatitis, hepatotoxicity, severe skin reaction, febrile neutropenia, alopecia, arthralgias/myalgias, nausea/vomiting, diarrhea, mucositis, asthenia, bleeding, bradycardia, edema). Questions answered.  She will agree to a trial of therapy.   10.    Re: Discussed potential toxicities of carboplatin (to include but not limited to: Anaphylactic reaction, nephrotoxicity, myelosuppression, O2 toxicity, neuropathy, nausea/ vomiting, vision loss, severe hypokalemia, hyponatremia, hypocalcemia, hypomagnesemia, elevated liver enzymes, pain, asthenia, paresthesias, abdominal pain, diarrhea, constipation, mucositis, bleeding, alopecia, injection site reaction).  Questions answered.  She will agree to a trial of therapy.    11.   Schedule treatment (plan: weekly x 6-6-eibiggdjrg with RT)- C1, 1/8/2024; C2, 1/15/2024; C3, 1/15/2024; C4, etc      paclitaxel 45 mg/m2 per administration guidelines   Carboplatin AUC 2 per administration guidelines.      Premedicate with:   Aloxi 0.25 mg IV before chemo   Decadron 10 mg p.o. IV before chemo   Pepcid 20 mg IV   Benadryl 50 mg IV     12.   CMP and CBC on days of therapy. Hold if creatinine > 1.5, total bilirubin > 1.5, AST/ALT > 3x ULN, TSH <0.5 or > 2. Administer Procrit 40,000 units subcutaneously if hemoglobin less than 10 or hematocrit less than 30. Neupogen 480 mcg sc x 3 days if ANC < 1.0     13.   Refer to general surgery Re: Port placement     14.  eRx:              Zofran 8 mg p.o. 3 times daily as needed, #30 - Rx     15.  Return to office in 6 weeks with preoffice CMP, CBC w diff  16.  Importance of Smoking  Cessation discussed with patient and informed patient additional information will be on today's [facility]'s Flyer - Plan to Be Tobacco Free handout provided to patient            I spent ~108 minutes caring for Deana on this date of service. This time includes time spent by me in the following activities: preparing for the visit, reviewing tests, performing a medically appropriate examination and/or evaluation, counseling and educating the patient/family/caregiver, ordering medications, tests, or procedures and documenting information in the medical record.

## 2024-12-30 ENCOUNTER — DOCUMENTATION (OUTPATIENT)
Dept: RADIATION ONCOLOGY | Facility: HOSPITAL | Age: 64
End: 2024-12-30
Payer: MEDICARE

## 2024-12-30 ENCOUNTER — HOSPITAL ENCOUNTER (OUTPATIENT)
Dept: RADIATION ONCOLOGY | Facility: HOSPITAL | Age: 64
Discharge: HOME | End: 2024-12-30
Payer: MEDICARE

## 2024-12-30 LAB
RAD ONC ARIA COURSE ID: NORMAL
RAD ONC ARIA COURSE INTENT: NORMAL
RAD ONC ARIA COURSE LAST TREATMENT DATE: NORMAL
RAD ONC ARIA COURSE START DATE: NORMAL
RAD ONC ARIA COURSE TREATMENT ELAPSED DAYS: 0
RAD ONC ARIA FIRST TREATMENT DATE: NORMAL
RAD ONC ARIA PLAN FRACTIONS TREATED TO DATE: 1
RAD ONC ARIA PLAN ID: NORMAL
RAD ONC ARIA PLAN PRESCRIBED DOSE PER FRACTION: 2 GY
RAD ONC ARIA PLAN PRIMARY REFERENCE POINT: NORMAL
RAD ONC ARIA PLAN TOTAL FRACTIONS PRESCRIBED: 27
RAD ONC ARIA PLAN TOTAL PRESCRIBED DOSE: 5400 CGY
RAD ONC ARIA REFERENCE POINT DOSAGE GIVEN TO DATE: 2 GY
RAD ONC ARIA REFERENCE POINT ID: NORMAL
RAD ONC ARIA REFERENCE POINT SESSION DOSAGE GIVEN: 2 GY

## 2024-12-30 PROCEDURE — 77386: CPT | Performed by: RADIOLOGY

## 2024-12-30 NOTE — PROGRESS NOTES
PATSY met with Mrs. Stack who is here to start radiation treatment for malignant neoplasm of upper lobe of left lung. PATSY introduced self and explained role and source of support. She is 64 years old and lives with her spouse and 15-year-old granddaughter. She has a strong support system which includes her spouse, and [employer]. She is a member at [employer]. Currently, she does not have transportation or financial concerns.     Mrs. Stack states her diagnosis hit her hard.  She was overwhelmed with her appointments and information received. Emotional support provided and encouraged her to express her feelings. She does not see a counselor. Mrs. Stack does take Wellbutrin but states she stated it to help her quit smoking. She is independent with her ADLs. She works twice a week at a shop. PATSY encouraged her to call as needed.

## 2024-12-31 ENCOUNTER — HOSPITAL ENCOUNTER (OUTPATIENT)
Dept: RADIATION ONCOLOGY | Facility: HOSPITAL | Age: 64
Setting detail: RADIATION/ONCOLOGY SERIES
Discharge: HOME | End: 2024-12-31
Payer: MEDICARE

## 2024-12-31 LAB
RAD ONC ARIA COURSE ID: NORMAL
RAD ONC ARIA COURSE INTENT: NORMAL
RAD ONC ARIA COURSE LAST TREATMENT DATE: NORMAL
RAD ONC ARIA COURSE START DATE: NORMAL
RAD ONC ARIA COURSE TREATMENT ELAPSED DAYS: 1
RAD ONC ARIA FIRST TREATMENT DATE: NORMAL
RAD ONC ARIA PLAN FRACTIONS TREATED TO DATE: 2
RAD ONC ARIA PLAN ID: NORMAL
RAD ONC ARIA PLAN PRESCRIBED DOSE PER FRACTION: 2 GY
RAD ONC ARIA PLAN PRIMARY REFERENCE POINT: NORMAL
RAD ONC ARIA PLAN TOTAL FRACTIONS PRESCRIBED: 27
RAD ONC ARIA PLAN TOTAL PRESCRIBED DOSE: 5400 CGY
RAD ONC ARIA REFERENCE POINT DOSAGE GIVEN TO DATE: 4 GY
RAD ONC ARIA REFERENCE POINT ID: NORMAL
RAD ONC ARIA REFERENCE POINT SESSION DOSAGE GIVEN: 2 GY

## 2024-12-31 PROCEDURE — 77386: CPT | Performed by: RADIOLOGY

## 2025-01-02 ENCOUNTER — HOSPITAL ENCOUNTER (OUTPATIENT)
Dept: RADIATION ONCOLOGY | Facility: HOSPITAL | Age: 65
Setting detail: RADIATION/ONCOLOGY SERIES
End: 2025-01-02
Payer: MEDICARE

## 2025-01-02 ENCOUNTER — HOSPITAL ENCOUNTER (OUTPATIENT)
Dept: RADIATION ONCOLOGY | Facility: HOSPITAL | Age: 65
Discharge: HOME | End: 2025-01-02

## 2025-01-02 ENCOUNTER — CONSULT (OUTPATIENT)
Dept: ONCOLOGY | Facility: CLINIC | Age: 65
End: 2025-01-02
Payer: MEDICARE

## 2025-01-02 ENCOUNTER — LAB (OUTPATIENT)
Dept: LAB | Facility: HOSPITAL | Age: 65
End: 2025-01-02
Payer: MEDICARE

## 2025-01-02 VITALS
HEIGHT: 61 IN | RESPIRATION RATE: 18 BRPM | BODY MASS INDEX: 25.85 KG/M2 | WEIGHT: 136.9 LBS | SYSTOLIC BLOOD PRESSURE: 118 MMHG | TEMPERATURE: 98.4 F | OXYGEN SATURATION: 98 % | DIASTOLIC BLOOD PRESSURE: 80 MMHG | HEART RATE: 83 BPM

## 2025-01-02 DIAGNOSIS — C34.12 MALIGNANT NEOPLASM OF UPPER LOBE OF LEFT LUNG: ICD-10-CM

## 2025-01-02 DIAGNOSIS — C34.12 MALIGNANT NEOPLASM OF UPPER LOBE OF LEFT LUNG: Primary | ICD-10-CM

## 2025-01-02 LAB
ALBUMIN SERPL-MCNC: 4.5 G/DL (ref 3.5–5.2)
ALBUMIN/GLOB SERPL: 1.7 G/DL
ALP SERPL-CCNC: 79 U/L (ref 39–117)
ALT SERPL W P-5'-P-CCNC: 13 U/L (ref 1–33)
ANION GAP SERPL CALCULATED.3IONS-SCNC: 10 MMOL/L (ref 5–15)
AST SERPL-CCNC: 15 U/L (ref 1–32)
BASOPHILS # BLD AUTO: 0.02 10*3/MM3 (ref 0–0.2)
BASOPHILS NFR BLD AUTO: 0.3 % (ref 0–1.5)
BILIRUB SERPL-MCNC: 0.2 MG/DL (ref 0–1.2)
BUN SERPL-MCNC: 18 MG/DL (ref 8–23)
BUN/CREAT SERPL: 22.5 (ref 7–25)
CALCIUM SPEC-SCNC: 9.5 MG/DL (ref 8.6–10.5)
CEA SERPL-MCNC: 2.97 NG/ML
CHLORIDE SERPL-SCNC: 103 MMOL/L (ref 98–107)
CO2 SERPL-SCNC: 27 MMOL/L (ref 22–29)
CREAT SERPL-MCNC: 0.8 MG/DL (ref 0.57–1)
DEPRECATED RDW RBC AUTO: 42.4 FL (ref 37–54)
EGFRCR SERPLBLD CKD-EPI 2021: 81.9 ML/MIN/1.73
EOSINOPHIL # BLD AUTO: 0.11 10*3/MM3 (ref 0–0.4)
EOSINOPHIL NFR BLD AUTO: 1.5 % (ref 0.3–6.2)
ERYTHROCYTE [DISTWIDTH] IN BLOOD BY AUTOMATED COUNT: 13 % (ref 12.3–15.4)
FERRITIN SERPL-MCNC: 343.4 NG/ML (ref 13–150)
FOLATE SERPL-MCNC: 16.9 NG/ML (ref 4.78–24.2)
GLOBULIN UR ELPH-MCNC: 2.6 GM/DL
GLUCOSE SERPL-MCNC: 92 MG/DL (ref 65–99)
HCT VFR BLD AUTO: 42.4 % (ref 34–46.6)
HGB BLD-MCNC: 13.9 G/DL (ref 12–15.9)
IMM GRANULOCYTES # BLD AUTO: 0.01 10*3/MM3 (ref 0–0.05)
IMM GRANULOCYTES NFR BLD AUTO: 0.1 % (ref 0–0.5)
IRON 24H UR-MRATE: 79 MCG/DL (ref 37–145)
IRON SATN MFR SERPL: 23 % (ref 20–50)
LYMPHOCYTES # BLD AUTO: 2.38 10*3/MM3 (ref 0.7–3.1)
LYMPHOCYTES NFR BLD AUTO: 32.3 % (ref 19.6–45.3)
MCH RBC QN AUTO: 29.1 PG (ref 26.6–33)
MCHC RBC AUTO-ENTMCNC: 32.8 G/DL (ref 31.5–35.7)
MCV RBC AUTO: 88.9 FL (ref 79–97)
MONOCYTES # BLD AUTO: 0.51 10*3/MM3 (ref 0.1–0.9)
MONOCYTES NFR BLD AUTO: 6.9 % (ref 5–12)
NEUTROPHILS NFR BLD AUTO: 4.34 10*3/MM3 (ref 1.7–7)
NEUTROPHILS NFR BLD AUTO: 58.9 % (ref 42.7–76)
NRBC BLD AUTO-RTO: 0 /100 WBC (ref 0–0.2)
PLATELET # BLD AUTO: 221 10*3/MM3 (ref 140–450)
PMV BLD AUTO: 9.4 FL (ref 6–12)
POTASSIUM SERPL-SCNC: 4.3 MMOL/L (ref 3.5–5.2)
PROT SERPL-MCNC: 7.1 G/DL (ref 6–8.5)
RAD ONC ARIA COURSE ID: NORMAL
RAD ONC ARIA COURSE INTENT: NORMAL
RAD ONC ARIA COURSE LAST TREATMENT DATE: NORMAL
RAD ONC ARIA COURSE START DATE: NORMAL
RAD ONC ARIA COURSE TREATMENT ELAPSED DAYS: 3
RAD ONC ARIA FIRST TREATMENT DATE: NORMAL
RAD ONC ARIA PLAN FRACTIONS TREATED TO DATE: 3
RAD ONC ARIA PLAN ID: NORMAL
RAD ONC ARIA PLAN PRESCRIBED DOSE PER FRACTION: 2 GY
RAD ONC ARIA PLAN PRIMARY REFERENCE POINT: NORMAL
RAD ONC ARIA PLAN TOTAL FRACTIONS PRESCRIBED: 27
RAD ONC ARIA PLAN TOTAL PRESCRIBED DOSE: 5400 CGY
RAD ONC ARIA REFERENCE POINT DOSAGE GIVEN TO DATE: 6 GY
RAD ONC ARIA REFERENCE POINT ID: NORMAL
RAD ONC ARIA REFERENCE POINT SESSION DOSAGE GIVEN: 2 GY
RBC # BLD AUTO: 4.77 10*6/MM3 (ref 3.77–5.28)
SODIUM SERPL-SCNC: 140 MMOL/L (ref 136–145)
TIBC SERPL-MCNC: 344 MCG/DL (ref 298–536)
TRANSFERRIN SERPL-MCNC: 231 MG/DL (ref 200–360)
VIT B12 BLD-MCNC: 576 PG/ML (ref 211–946)
WBC NRBC COR # BLD AUTO: 7.37 10*3/MM3 (ref 3.4–10.8)

## 2025-01-02 PROCEDURE — 84466 ASSAY OF TRANSFERRIN: CPT

## 2025-01-02 PROCEDURE — 77386: CPT | Performed by: RADIOLOGY

## 2025-01-02 PROCEDURE — 36415 COLL VENOUS BLD VENIPUNCTURE: CPT

## 2025-01-02 PROCEDURE — 82378 CARCINOEMBRYONIC ANTIGEN: CPT

## 2025-01-02 PROCEDURE — 77014 CHG CT GUIDANCE RADIATION THERAPY FLDS PLACEMENT: CPT | Performed by: RADIOLOGY

## 2025-01-02 PROCEDURE — 80053 COMPREHEN METABOLIC PANEL: CPT

## 2025-01-02 PROCEDURE — 82746 ASSAY OF FOLIC ACID SERUM: CPT

## 2025-01-02 PROCEDURE — 82728 ASSAY OF FERRITIN: CPT

## 2025-01-02 PROCEDURE — 85025 COMPLETE CBC W/AUTO DIFF WBC: CPT

## 2025-01-02 PROCEDURE — 83540 ASSAY OF IRON: CPT

## 2025-01-02 PROCEDURE — 82607 VITAMIN B-12: CPT

## 2025-01-02 RX ORDER — ONDANSETRON 8 MG/1
8 TABLET, FILM COATED ORAL EVERY 8 HOURS PRN
Qty: 30 TABLET | Refills: 0 | Status: SHIPPED | OUTPATIENT
Start: 2025-01-02

## 2025-01-03 ENCOUNTER — HOSPITAL ENCOUNTER (OUTPATIENT)
Dept: RADIATION ONCOLOGY | Facility: HOSPITAL | Age: 65
Discharge: HOME | End: 2025-01-03

## 2025-01-03 LAB
RAD ONC ARIA COURSE ID: NORMAL
RAD ONC ARIA COURSE INTENT: NORMAL
RAD ONC ARIA COURSE LAST TREATMENT DATE: NORMAL
RAD ONC ARIA COURSE START DATE: NORMAL
RAD ONC ARIA COURSE TREATMENT ELAPSED DAYS: 4
RAD ONC ARIA FIRST TREATMENT DATE: NORMAL
RAD ONC ARIA PLAN FRACTIONS TREATED TO DATE: 4
RAD ONC ARIA PLAN ID: NORMAL
RAD ONC ARIA PLAN PRESCRIBED DOSE PER FRACTION: 2 GY
RAD ONC ARIA PLAN PRIMARY REFERENCE POINT: NORMAL
RAD ONC ARIA PLAN TOTAL FRACTIONS PRESCRIBED: 27
RAD ONC ARIA PLAN TOTAL PRESCRIBED DOSE: 5400 CGY
RAD ONC ARIA REFERENCE POINT DOSAGE GIVEN TO DATE: 8 GY
RAD ONC ARIA REFERENCE POINT ID: NORMAL
RAD ONC ARIA REFERENCE POINT SESSION DOSAGE GIVEN: 2 GY

## 2025-01-03 PROCEDURE — 77386: CPT | Performed by: RADIOLOGY

## 2025-01-03 PROCEDURE — 77014 CHG CT GUIDANCE RADIATION THERAPY FLDS PLACEMENT: CPT | Performed by: RADIOLOGY

## 2025-01-07 ENCOUNTER — CLINICAL SUPPORT (OUTPATIENT)
Dept: ONCOLOGY | Facility: CLINIC | Age: 65
End: 2025-01-07
Payer: MEDICARE

## 2025-01-07 ENCOUNTER — HOSPITAL ENCOUNTER (OUTPATIENT)
Dept: RADIATION ONCOLOGY | Facility: HOSPITAL | Age: 65
Discharge: HOME | End: 2025-01-07

## 2025-01-07 DIAGNOSIS — C34.12 MALIGNANT NEOPLASM OF UPPER LOBE OF LEFT LUNG: Primary | ICD-10-CM

## 2025-01-07 LAB
RAD ONC ARIA COURSE ID: NORMAL
RAD ONC ARIA COURSE INTENT: NORMAL
RAD ONC ARIA COURSE LAST TREATMENT DATE: NORMAL
RAD ONC ARIA COURSE START DATE: NORMAL
RAD ONC ARIA COURSE TREATMENT ELAPSED DAYS: 8
RAD ONC ARIA FIRST TREATMENT DATE: NORMAL
RAD ONC ARIA PLAN FRACTIONS TREATED TO DATE: 5
RAD ONC ARIA PLAN ID: NORMAL
RAD ONC ARIA PLAN PRESCRIBED DOSE PER FRACTION: 2 GY
RAD ONC ARIA PLAN PRIMARY REFERENCE POINT: NORMAL
RAD ONC ARIA PLAN TOTAL FRACTIONS PRESCRIBED: 27
RAD ONC ARIA PLAN TOTAL PRESCRIBED DOSE: 5400 CGY
RAD ONC ARIA REFERENCE POINT DOSAGE GIVEN TO DATE: 10 GY
RAD ONC ARIA REFERENCE POINT ID: NORMAL
RAD ONC ARIA REFERENCE POINT SESSION DOSAGE GIVEN: 2 GY

## 2025-01-07 PROCEDURE — 77336 RADIATION PHYSICS CONSULT: CPT | Performed by: RADIOLOGY

## 2025-01-07 PROCEDURE — 77014 CHG CT GUIDANCE RADIATION THERAPY FLDS PLACEMENT: CPT | Performed by: RADIOLOGY

## 2025-01-07 PROCEDURE — 77386: CPT | Performed by: RADIOLOGY

## 2025-01-07 NOTE — PROGRESS NOTES
Subjective     PATIENT NAME:  Deana Stack  YOB: 1960  PATIENTS AGE:  64 y.o.  PATIENTS SEX:  female  DATE OF SERVICE:  01/07/2025  PROVIDER:  [facility] NURSE      ____________________PATIENT EDUCATION____________________    PATIENT EDUCATION:  Today I met with the patient  Deana Stack and her friend, to discuss the chemotherapy regimen Carboplatin/Taxol with concurrent radiation therapy recommended for treatment of her disease Lung Cancer.  The patient was given explanation of treatment premed side effects including office policy that prohibits patients to drive if sedating medications are administered, MD explanation given regarding benefits, side effects, toxicities and goals of treatment.  The patient received a Chemotherapy/Biotherapy Plan Summary including diagnosis and specific treatment plan.    SIDE EFFECTS:  Common side effects were discussed with the patient and/or significant other.  Discussion included hair loss/discoloration, anemia/fatigue, infection/chills/fever, appetite, bleeding risk/precautions, constipation, diarrhea, mouth sores, taste alteration, loss of appetite,nausea/vomiting, peripheral neuropathy, skin/nail changes, rash, muscle aches/weakness, photosensitivity, weight gain/loss, hearing loss, dizziness, menopausal symptoms, menstrual irregularity, sterility, high blood pressure, heart damage, liver damage, lung damage, kidney damage, DVT/PE risk, fluid retention, pleural/pericardial effusion, somnolence, electrolyte/LFT imbalance, vein exercises and/or the possible need for vascular access/port placement.  The patient was advice that although uncommon, leakage of an infused medication from the vein or venous access device (port) may lead to skin breakdown and/or other tissue damage.  The patient was advised that he/she may have pain, bleeding, and/or bruising from the insertion of a needle in their vein or venous access device (port).  The patient was further  advised that, in spite of proper technique, infection with redness and irritation may rarely occur at the site where the needle was inserted.  The patient was advised that if complications occur, additional medical treatment is available.    Discussion also included side effects specific to drugs in the treatment plan, specifically: Carboplatin/Taxol, neutropenia, thrombocytopenia, anemia, n/v, bone pain, muscle aches, diarrhea, hair loss, weakness, fatigue, loss of appetite, wt loss, wt gain, headaches, mouth sores, neuropathy,     Reproductive risks were discussed, including appropriate use of birth control and protection during sexual relations.    A total of  60 minutes were spent with the patient, with 100% of time spent in education and counseling.

## 2025-01-08 ENCOUNTER — HOSPITAL ENCOUNTER (OUTPATIENT)
Dept: RADIATION ONCOLOGY | Facility: HOSPITAL | Age: 65
Discharge: HOME | End: 2025-01-08

## 2025-01-08 ENCOUNTER — DOCUMENTATION (OUTPATIENT)
Dept: RADIATION ONCOLOGY | Facility: HOSPITAL | Age: 65
End: 2025-01-08
Payer: MEDICARE

## 2025-01-08 ENCOUNTER — OFFICE VISIT (OUTPATIENT)
Dept: SURGERY | Facility: CLINIC | Age: 65
End: 2025-01-08
Payer: MEDICARE

## 2025-01-08 ENCOUNTER — PATIENT ROUNDING (BHMG ONLY) (OUTPATIENT)
Dept: SURGERY | Facility: CLINIC | Age: 65
End: 2025-01-08
Payer: MEDICARE

## 2025-01-08 VITALS
DIASTOLIC BLOOD PRESSURE: 70 MMHG | OXYGEN SATURATION: 96 % | HEART RATE: 85 BPM | BODY MASS INDEX: 25.68 KG/M2 | SYSTOLIC BLOOD PRESSURE: 110 MMHG | WEIGHT: 136 LBS | HEIGHT: 61 IN

## 2025-01-08 DIAGNOSIS — R91.1 LEFT UPPER LOBE PULMONARY NODULE: Primary | ICD-10-CM

## 2025-01-08 DIAGNOSIS — C34.12 MALIGNANT NEOPLASM OF UPPER LOBE OF LEFT LUNG: Primary | ICD-10-CM

## 2025-01-08 DIAGNOSIS — Z45.2 ENCOUNTER FOR CARE RELATED TO PORT-A-CATH: ICD-10-CM

## 2025-01-08 LAB
RAD ONC ARIA COURSE ID: NORMAL
RAD ONC ARIA COURSE INTENT: NORMAL
RAD ONC ARIA COURSE LAST TREATMENT DATE: NORMAL
RAD ONC ARIA COURSE START DATE: NORMAL
RAD ONC ARIA COURSE TREATMENT ELAPSED DAYS: 9
RAD ONC ARIA FIRST TREATMENT DATE: NORMAL
RAD ONC ARIA PLAN FRACTIONS TREATED TO DATE: 6
RAD ONC ARIA PLAN ID: NORMAL
RAD ONC ARIA PLAN PRESCRIBED DOSE PER FRACTION: 2 GY
RAD ONC ARIA PLAN PRIMARY REFERENCE POINT: NORMAL
RAD ONC ARIA PLAN TOTAL FRACTIONS PRESCRIBED: 27
RAD ONC ARIA PLAN TOTAL PRESCRIBED DOSE: 5400 CGY
RAD ONC ARIA REFERENCE POINT DOSAGE GIVEN TO DATE: 12 GY
RAD ONC ARIA REFERENCE POINT ID: NORMAL
RAD ONC ARIA REFERENCE POINT SESSION DOSAGE GIVEN: 2 GY

## 2025-01-08 PROCEDURE — 77014 CHG CT GUIDANCE RADIATION THERAPY FLDS PLACEMENT: CPT | Performed by: RADIOLOGY

## 2025-01-08 PROCEDURE — 77386: CPT | Performed by: RADIOLOGY

## 2025-01-08 PROCEDURE — 77427 RADIATION TX MANAGEMENT X5: CPT | Performed by: RADIOLOGY

## 2025-01-08 RX ORDER — PALONOSETRON 0.05 MG/ML
0.25 INJECTION, SOLUTION INTRAVENOUS ONCE
OUTPATIENT
Start: 2025-01-16

## 2025-01-08 RX ORDER — SODIUM CHLORIDE 9 MG/ML
20 INJECTION, SOLUTION INTRAVENOUS ONCE
OUTPATIENT
Start: 2025-01-16

## 2025-01-08 RX ORDER — FAMOTIDINE 10 MG/ML
20 INJECTION, SOLUTION INTRAVENOUS ONCE
Start: 2025-01-16 | End: 2025-01-16

## 2025-01-08 RX ORDER — FAMOTIDINE 10 MG/ML
20 INJECTION, SOLUTION INTRAVENOUS AS NEEDED
OUTPATIENT
Start: 2025-01-16

## 2025-01-08 RX ORDER — HYDROCORTISONE SODIUM SUCCINATE 100 MG/2ML
100 INJECTION INTRAMUSCULAR; INTRAVENOUS AS NEEDED
OUTPATIENT
Start: 2025-01-16

## 2025-01-08 RX ORDER — DIPHENHYDRAMINE HYDROCHLORIDE 50 MG/ML
50 INJECTION INTRAMUSCULAR; INTRAVENOUS AS NEEDED
OUTPATIENT
Start: 2025-01-16

## 2025-01-08 RX ORDER — DEXAMETHASONE SODIUM PHOSPHATE 10 MG/ML
10 INJECTION INTRAMUSCULAR; INTRAVENOUS ONCE
Start: 2025-01-16 | End: 2025-01-16

## 2025-01-08 NOTE — PROGRESS NOTES
Office New Patient History and Physical:     Referring Provider: Mitchel Tello MD    Chief Complaint   Patient presents with    port       Subjective     History of Present Illness  The patient presents for port placement.    She is scheduled to start treatment for left upper lobe lung cancer on 01/16/2025. She has been advised to have a port placed prior to the initiation of her treatment. She has no history of having a port or any other chest devices such as a pacemaker. She does not require supplemental oxygen and is not currently on any anticoagulant therapy. Her daily routine includes radiation therapy at 10:15 AM. She has undergone laboratory testing and imaging studies of her chest.    Supplemental Information  She is working on quitting smoking and Wellbutrin is helping.    SOCIAL HISTORY  The patient admits to smoking 2 cigarettes a day.    MEDICATIONS  Wellbutrin       Review of Systems    Review of Systems - General ROS: negative  ENT ROS: negative  Respiratory ROS: positive for - left lung cancer  Cardiovascular ROS: no chest pain or dyspnea on exertion  Gastrointestinal ROS: no abdominal pain, change in bowel habits, or black or bloody stools  Genito-Urinary ROS: no dysuria, trouble voiding, or hematuria  Dermatological ROS: negative   Breast ROS: negative for breast lumps  Hematological and Lymphatic ROS: negative  Musculoskeletal ROS: negative   Neurological ROS: no TIA or stroke symptoms    Psychological ROS: negative  Endocrine ROS: negative    History  Past Medical History:   Diagnosis Date    Bronchiectasis     GERD (gastroesophageal reflux disease)     Lung cancer    ,   Past Surgical History:   Procedure Laterality Date    BRONCHOSCOPY WITH ION ROBOTIC ASSIST N/A 10/28/2024    Procedure: BRONCHOSCOPY WITH ION ROBOT;  Surgeon: Donovan Dewitt MD;  Location: [facility];  Service: Robotics - Pulmonary;  Laterality: N/A;  preop; GALLO nodule   postop GALLO nodule   PCP Elena Fitch     "CHOLECYSTECTOMY N/A     HYSTERECTOMY     ,   Family History   Problem Relation Age of Onset    Cancer Mother     Asthma Sister    ,   Social History     Tobacco Use    Smoking status: Every Day     Current packs/day: 0.50     Average packs/day: 0.5 packs/day for 48.0 years (24.0 ttl pk-yrs)     Types: Cigarettes     Start date: 1977     Passive exposure: Current    Smokeless tobacco: Never   Vaping Use    Vaping status: Never Used   Substance Use Topics    Alcohol use: Never    Drug use: Never   , (Not in a hospital admission)   and Allergies:  Patient has no known allergies.    Current Outpatient Medications:     buPROPion SR (WELLBUTRIN SR) 150 MG 12 hr tablet, Take 1 tablet by mouth Daily., Disp: , Rfl:     Cholecalciferol 10 MCG (400 UNIT) tablet, Take 1 tablet by mouth Daily., Disp: , Rfl:     hydrOXYzine (ATARAX) 10 MG tablet, Take 1 tablet by mouth 3 (Three) Times a Day As Needed for Anxiety., Disp: , Rfl:     mirtazapine (REMERON SOL-TAB) 15 MG disintegrating tablet, Place 1 tablet on the tongue Every Night., Disp: , Rfl:     multivitamin with minerals tablet tablet, Take 1 tablet by mouth Daily., Disp: , Rfl:     ondansetron (ZOFRAN) 8 MG tablet, Take 1 tablet by mouth Every 8 (Eight) Hours As Needed for Nausea or Vomiting., Disp: 30 tablet, Rfl: 0    vitamin C (ASCORBIC ACID) 250 MG tablet, Take 1 tablet by mouth Daily., Disp: , Rfl:     zinc sulfate (ZINCATE) 220 (50 Zn) MG capsule, Take 1 capsule by mouth Daily., Disp: , Rfl:     Objective     Vital Signs   /70   Pulse 85   Ht 153.7 cm (60.5\")   Wt 61.7 kg (136 lb)   SpO2 96%   BMI 26.12 kg/m²      Physical Exam:  General appearance - alert, well appearing, and in no distress  Mental status - normal mood, behavior, speech, dress, motor activity, and thought processes  Eyes - sclera anicteric  Neck - supple, no significant adenopathy  Chest - no tachypnea, retractions or cyanosis  Heart - normal rate and regular rhythm  Neurological - alert, " oriented, normal speech, no focal findings or movement disorder noted  Extremities - no pedal edema noted  Skin - normal coloration and turgor, no rashes, no suspicious skin lesions noted    Physical Exam         Results Review:  Result Review :            Results         Assessment & Plan     Diagnoses and all orders for this visit:    1. Left upper lobe pulmonary nodule (Primary)    2. Encounter for care related to Port-a-Cath  -     Case Request; Standing  -     ECG 12 Lead; Future  -     ceFAZolin (ANCEF) 2,000 mg in sodium chloride 0.9 % 100 mL IVPB  -     Case Request    Other orders  -     Follow Anesthesia Guidelines / Protocol; Future  -     Follow Anesthesia Guidelines / Protocol; Standing  -     Verify / Perform Chlorhexidine Skin Prep; Standing  -     Provide NPO Instructions to Patient; Future  -     Chlorhexidine Skin Prep; Future  -     Notify physician (specify); Standing  -     Instructions on coughing, deep breathing, and incentive spirometry.; Standing  -     Oxygen Therapy-; Standing  -     Place Sequential Compression Device; Standing  -     Maintain Sequential Compression Device; Standing         Assessment & Plan  1. Port placement.  A comprehensive discussion was held regarding the procedure, including potential risks such as infection, malfunction, and pneumothorax. The port will be placed on the right side to avoid interference with radiation therapy for left upper lobe lung cancer. Postoperative care instructions were provided, emphasizing the importance of avoiding heavy lifting exceeding 25 pounds on the right side for a duration of 2 weeks and refraining from submerging the incision in water for the same period. She was informed that she could shower but should not soak the incision. An EKG will be ordered to ensure she is fit for anesthesia. She was advised to have radiation therapy first and then proceed to the preoperative area for surgery.    Follow-up  The patient will follow up in  2 weeks postoperatively to ensure proper healing of the incision.    The patient is currently scheduled for this procedure on 1/14/25 with Dr. Lemus at 1:30pm, with arrival at 11:30 that morning.     I also discussed with the patient post-operative pain management including multimodal pain control utilizing Tylenol, ibuprofen, and tramadol for breakthrough pain. I will plan to given the patient 5 tabs of 50mg Ultram post-operatively for break through pain.     Follow up:           Return if symptoms worsen or fail to improve.        Fatou Ray PA-C  01/09/25  12:53 CST      Patient or patient representative verbalized consent for the use of Ambient Listening during the visit with  Fatou Ray PA-C for chart documentation. 1/9/2025  09:49 CST

## 2025-01-08 NOTE — PROGRESS NOTES
PATSY met with Mrs. Stack due to her distress score from her chemo education visit on 1-7-25 for malignant neoplasm of upper lobe of left lung. She is 64 years old and lives with her spouse and 15-year-old granddaughter. She continues to have a strong support system which includes family and her [employer].    Mrs. Stack states she was overwhelmed hearing the possible side effects of chemo. It is also getting closer to her start date, and this is shocking and a lot to process. Provided emotional support throughout, utilizing empathy and validating and normalizing identified emotions. She did become tearful during this conversation. She is a strong individual and has a positive attitude.     PATSY did ask about financial/transportation concerns. She did have questions regarding bill and this writer encouraged her to call the financial department. Her  is having knee surgery next week and she will have to depend on friends to drive her to chemo. PATSY did speak to her regarding gas assistance through [employer] and [facility]. She will let this writer know if she would like a referral sent. PATSY will remain available.

## 2025-01-08 NOTE — H&P (VIEW-ONLY)
Office New Patient History and Physical:     Referring Provider: Mitchel Tello MD    Chief Complaint   Patient presents with    port       Subjective     History of Present Illness  The patient presents for port placement.    She is scheduled to start treatment for left upper lobe lung cancer on 01/16/2025. She has been advised to have a port placed prior to the initiation of her treatment. She has no history of having a port or any other chest devices such as a pacemaker. She does not require supplemental oxygen and is not currently on any anticoagulant therapy. Her daily routine includes radiation therapy at 10:15 AM. She has undergone laboratory testing and imaging studies of her chest.    Supplemental Information  She is working on quitting smoking and Wellbutrin is helping.    SOCIAL HISTORY  The patient admits to smoking 2 cigarettes a day.    MEDICATIONS  Wellbutrin       Review of Systems    Review of Systems - General ROS: negative  ENT ROS: negative  Respiratory ROS: positive for - left lung cancer  Cardiovascular ROS: no chest pain or dyspnea on exertion  Gastrointestinal ROS: no abdominal pain, change in bowel habits, or black or bloody stools  Genito-Urinary ROS: no dysuria, trouble voiding, or hematuria  Dermatological ROS: negative   Breast ROS: negative for breast lumps  Hematological and Lymphatic ROS: negative  Musculoskeletal ROS: negative   Neurological ROS: no TIA or stroke symptoms    Psychological ROS: negative  Endocrine ROS: negative    History  Past Medical History:   Diagnosis Date    Bronchiectasis     GERD (gastroesophageal reflux disease)     Lung cancer    ,   Past Surgical History:   Procedure Laterality Date    BRONCHOSCOPY WITH ION ROBOTIC ASSIST N/A 10/28/2024    Procedure: BRONCHOSCOPY WITH ION ROBOT;  Surgeon: Donovan Dewitt MD;  Location: [facility];  Service: Robotics - Pulmonary;  Laterality: N/A;  preop; GALLO nodule   postop GALLO nodule   PCP Elena Fitch     "CHOLECYSTECTOMY N/A     HYSTERECTOMY     ,   Family History   Problem Relation Age of Onset    Cancer Mother     Asthma Sister    ,   Social History     Tobacco Use    Smoking status: Every Day     Current packs/day: 0.50     Average packs/day: 0.5 packs/day for 48.0 years (24.0 ttl pk-yrs)     Types: Cigarettes     Start date: 1977     Passive exposure: Current    Smokeless tobacco: Never   Vaping Use    Vaping status: Never Used   Substance Use Topics    Alcohol use: Never    Drug use: Never   , (Not in a hospital admission)   and Allergies:  Patient has no known allergies.    Current Outpatient Medications:     buPROPion SR (WELLBUTRIN SR) 150 MG 12 hr tablet, Take 1 tablet by mouth Daily., Disp: , Rfl:     Cholecalciferol 10 MCG (400 UNIT) tablet, Take 1 tablet by mouth Daily., Disp: , Rfl:     hydrOXYzine (ATARAX) 10 MG tablet, Take 1 tablet by mouth 3 (Three) Times a Day As Needed for Anxiety., Disp: , Rfl:     mirtazapine (REMERON SOL-TAB) 15 MG disintegrating tablet, Place 1 tablet on the tongue Every Night., Disp: , Rfl:     multivitamin with minerals tablet tablet, Take 1 tablet by mouth Daily., Disp: , Rfl:     ondansetron (ZOFRAN) 8 MG tablet, Take 1 tablet by mouth Every 8 (Eight) Hours As Needed for Nausea or Vomiting., Disp: 30 tablet, Rfl: 0    vitamin C (ASCORBIC ACID) 250 MG tablet, Take 1 tablet by mouth Daily., Disp: , Rfl:     zinc sulfate (ZINCATE) 220 (50 Zn) MG capsule, Take 1 capsule by mouth Daily., Disp: , Rfl:     Objective     Vital Signs   /70   Pulse 85   Ht 153.7 cm (60.5\")   Wt 61.7 kg (136 lb)   SpO2 96%   BMI 26.12 kg/m²      Physical Exam:  General appearance - alert, well appearing, and in no distress  Mental status - normal mood, behavior, speech, dress, motor activity, and thought processes  Eyes - sclera anicteric  Neck - supple, no significant adenopathy  Chest - no tachypnea, retractions or cyanosis  Heart - normal rate and regular rhythm  Neurological - alert, " oriented, normal speech, no focal findings or movement disorder noted  Extremities - no pedal edema noted  Skin - normal coloration and turgor, no rashes, no suspicious skin lesions noted    Physical Exam         Results Review:  Result Review :            Results         Assessment & Plan     Diagnoses and all orders for this visit:    1. Left upper lobe pulmonary nodule (Primary)    2. Encounter for care related to Port-a-Cath  -     Case Request; Standing  -     ECG 12 Lead; Future  -     ceFAZolin (ANCEF) 2,000 mg in sodium chloride 0.9 % 100 mL IVPB  -     Case Request    Other orders  -     Follow Anesthesia Guidelines / Protocol; Future  -     Follow Anesthesia Guidelines / Protocol; Standing  -     Verify / Perform Chlorhexidine Skin Prep; Standing  -     Provide NPO Instructions to Patient; Future  -     Chlorhexidine Skin Prep; Future  -     Notify physician (specify); Standing  -     Instructions on coughing, deep breathing, and incentive spirometry.; Standing  -     Oxygen Therapy-; Standing  -     Place Sequential Compression Device; Standing  -     Maintain Sequential Compression Device; Standing         Assessment & Plan  1. Port placement.  A comprehensive discussion was held regarding the procedure, including potential risks such as infection, malfunction, and pneumothorax. The port will be placed on the right side to avoid interference with radiation therapy for left upper lobe lung cancer. Postoperative care instructions were provided, emphasizing the importance of avoiding heavy lifting exceeding 25 pounds on the right side for a duration of 2 weeks and refraining from submerging the incision in water for the same period. She was informed that she could shower but should not soak the incision. An EKG will be ordered to ensure she is fit for anesthesia. She was advised to have radiation therapy first and then proceed to the preoperative area for surgery.    Follow-up  The patient will follow up in  2 weeks postoperatively to ensure proper healing of the incision.    The patient is currently scheduled for this procedure on 1/14/25 with Dr. Lemus at 1:30pm, with arrival at 11:30 that morning.     I also discussed with the patient post-operative pain management including multimodal pain control utilizing Tylenol, ibuprofen, and tramadol for breakthrough pain. I will plan to given the patient 5 tabs of 50mg Ultram post-operatively for break through pain.     Follow up:           Return if symptoms worsen or fail to improve.        Fatou Ray PA-C  01/09/25  12:53 CST      Patient or patient representative verbalized consent for the use of Ambient Listening during the visit with  Fatou Ray PA-C for chart documentation. 1/9/2025  09:49 CST

## 2025-01-09 ENCOUNTER — HOSPITAL ENCOUNTER (OUTPATIENT)
Dept: RADIATION ONCOLOGY | Facility: HOSPITAL | Age: 65
Discharge: HOME | End: 2025-01-09

## 2025-01-09 LAB
RAD ONC ARIA COURSE ID: NORMAL
RAD ONC ARIA COURSE INTENT: NORMAL
RAD ONC ARIA COURSE LAST TREATMENT DATE: NORMAL
RAD ONC ARIA COURSE START DATE: NORMAL
RAD ONC ARIA COURSE TREATMENT ELAPSED DAYS: 10
RAD ONC ARIA FIRST TREATMENT DATE: NORMAL
RAD ONC ARIA PLAN FRACTIONS TREATED TO DATE: 7
RAD ONC ARIA PLAN ID: NORMAL
RAD ONC ARIA PLAN PRESCRIBED DOSE PER FRACTION: 2 GY
RAD ONC ARIA PLAN PRIMARY REFERENCE POINT: NORMAL
RAD ONC ARIA PLAN TOTAL FRACTIONS PRESCRIBED: 27
RAD ONC ARIA PLAN TOTAL PRESCRIBED DOSE: 5400 CGY
RAD ONC ARIA REFERENCE POINT DOSAGE GIVEN TO DATE: 14 GY
RAD ONC ARIA REFERENCE POINT ID: NORMAL
RAD ONC ARIA REFERENCE POINT SESSION DOSAGE GIVEN: 2 GY

## 2025-01-09 PROCEDURE — 77014 CHG CT GUIDANCE RADIATION THERAPY FLDS PLACEMENT: CPT | Performed by: RADIOLOGY

## 2025-01-09 PROCEDURE — 77386: CPT | Performed by: RADIOLOGY

## 2025-01-10 ENCOUNTER — HOSPITAL ENCOUNTER (OUTPATIENT)
Dept: RADIATION ONCOLOGY | Facility: HOSPITAL | Age: 65
Discharge: HOME | End: 2025-01-10

## 2025-01-10 LAB
BEAKER LAB AP INTRAOPERATIVE CONSULTATION: NORMAL
CYTO UR: NORMAL
LAB AP CASE REPORT: NORMAL
LAB AP DIAGNOSIS COMMENT: NORMAL
Lab: NORMAL
PATH REPORT.ADDENDUM SPEC: NORMAL
PATH REPORT.FINAL DX SPEC: NORMAL
PATH REPORT.GROSS SPEC: NORMAL
RAD ONC ARIA COURSE ID: NORMAL
RAD ONC ARIA COURSE INTENT: NORMAL
RAD ONC ARIA COURSE LAST TREATMENT DATE: NORMAL
RAD ONC ARIA COURSE START DATE: NORMAL
RAD ONC ARIA COURSE TREATMENT ELAPSED DAYS: 11
RAD ONC ARIA FIRST TREATMENT DATE: NORMAL
RAD ONC ARIA PLAN FRACTIONS TREATED TO DATE: 8
RAD ONC ARIA PLAN ID: NORMAL
RAD ONC ARIA PLAN PRESCRIBED DOSE PER FRACTION: 2 GY
RAD ONC ARIA PLAN PRIMARY REFERENCE POINT: NORMAL
RAD ONC ARIA PLAN TOTAL FRACTIONS PRESCRIBED: 27
RAD ONC ARIA PLAN TOTAL PRESCRIBED DOSE: 5400 CGY
RAD ONC ARIA REFERENCE POINT DOSAGE GIVEN TO DATE: 16 GY
RAD ONC ARIA REFERENCE POINT ID: NORMAL
RAD ONC ARIA REFERENCE POINT SESSION DOSAGE GIVEN: 2 GY

## 2025-01-10 PROCEDURE — 77014 CHG CT GUIDANCE RADIATION THERAPY FLDS PLACEMENT: CPT | Performed by: RADIOLOGY

## 2025-01-10 PROCEDURE — 77386: CPT | Performed by: RADIOLOGY

## 2025-01-13 ENCOUNTER — HOSPITAL ENCOUNTER (OUTPATIENT)
Dept: RADIATION ONCOLOGY | Facility: HOSPITAL | Age: 65
Discharge: HOME | End: 2025-01-13
Payer: MEDICARE

## 2025-01-13 LAB
RAD ONC ARIA COURSE ID: NORMAL
RAD ONC ARIA COURSE INTENT: NORMAL
RAD ONC ARIA COURSE LAST TREATMENT DATE: NORMAL
RAD ONC ARIA COURSE START DATE: NORMAL
RAD ONC ARIA COURSE TREATMENT ELAPSED DAYS: 14
RAD ONC ARIA FIRST TREATMENT DATE: NORMAL
RAD ONC ARIA PLAN FRACTIONS TREATED TO DATE: 9
RAD ONC ARIA PLAN ID: NORMAL
RAD ONC ARIA PLAN PRESCRIBED DOSE PER FRACTION: 2 GY
RAD ONC ARIA PLAN PRIMARY REFERENCE POINT: NORMAL
RAD ONC ARIA PLAN TOTAL FRACTIONS PRESCRIBED: 27
RAD ONC ARIA PLAN TOTAL PRESCRIBED DOSE: 5400 CGY
RAD ONC ARIA REFERENCE POINT DOSAGE GIVEN TO DATE: 18 GY
RAD ONC ARIA REFERENCE POINT ID: NORMAL
RAD ONC ARIA REFERENCE POINT SESSION DOSAGE GIVEN: 2 GY

## 2025-01-13 PROCEDURE — 77386: CPT | Performed by: RADIOLOGY

## 2025-01-13 PROCEDURE — 77014 CHG CT GUIDANCE RADIATION THERAPY FLDS PLACEMENT: CPT | Performed by: RADIOLOGY

## 2025-01-14 ENCOUNTER — APPOINTMENT (OUTPATIENT)
Dept: GENERAL RADIOLOGY | Facility: HOSPITAL | Age: 65
End: 2025-01-14
Payer: MEDICARE

## 2025-01-14 ENCOUNTER — HOSPITAL ENCOUNTER (OUTPATIENT)
Facility: HOSPITAL | Age: 65
Setting detail: HOSPITAL OUTPATIENT SURGERY
Discharge: HOME | End: 2025-01-14
Attending: STUDENT IN AN ORGANIZED HEALTH CARE EDUCATION/TRAINING PROGRAM | Admitting: STUDENT IN AN ORGANIZED HEALTH CARE EDUCATION/TRAINING PROGRAM
Payer: MEDICARE

## 2025-01-14 ENCOUNTER — ANESTHESIA EVENT (OUTPATIENT)
Dept: PERIOP | Facility: HOSPITAL | Age: 65
End: 2025-01-14
Payer: MEDICARE

## 2025-01-14 ENCOUNTER — ANESTHESIA (OUTPATIENT)
Dept: PERIOP | Facility: HOSPITAL | Age: 65
End: 2025-01-14
Payer: MEDICARE

## 2025-01-14 ENCOUNTER — HOSPITAL ENCOUNTER (OUTPATIENT)
Dept: RADIATION ONCOLOGY | Facility: HOSPITAL | Age: 65
Discharge: HOME | End: 2025-01-14

## 2025-01-14 VITALS
SYSTOLIC BLOOD PRESSURE: 126 MMHG | WEIGHT: 136.91 LBS | OXYGEN SATURATION: 98 % | HEIGHT: 63 IN | HEART RATE: 81 BPM | DIASTOLIC BLOOD PRESSURE: 71 MMHG | BODY MASS INDEX: 24.26 KG/M2 | RESPIRATION RATE: 16 BRPM | TEMPERATURE: 96.8 F

## 2025-01-14 DIAGNOSIS — C34.12 MALIGNANT NEOPLASM OF UPPER LOBE OF LEFT LUNG: Primary | ICD-10-CM

## 2025-01-14 DIAGNOSIS — Z45.2 ENCOUNTER FOR CARE RELATED TO PORT-A-CATH: ICD-10-CM

## 2025-01-14 LAB
RAD ONC ARIA COURSE ID: NORMAL
RAD ONC ARIA COURSE INTENT: NORMAL
RAD ONC ARIA COURSE LAST TREATMENT DATE: NORMAL
RAD ONC ARIA COURSE START DATE: NORMAL
RAD ONC ARIA COURSE TREATMENT ELAPSED DAYS: 15
RAD ONC ARIA FIRST TREATMENT DATE: NORMAL
RAD ONC ARIA PLAN FRACTIONS TREATED TO DATE: 10
RAD ONC ARIA PLAN ID: NORMAL
RAD ONC ARIA PLAN PRESCRIBED DOSE PER FRACTION: 2 GY
RAD ONC ARIA PLAN PRIMARY REFERENCE POINT: NORMAL
RAD ONC ARIA PLAN TOTAL FRACTIONS PRESCRIBED: 27
RAD ONC ARIA PLAN TOTAL PRESCRIBED DOSE: 5400 CGY
RAD ONC ARIA REFERENCE POINT DOSAGE GIVEN TO DATE: 20 GY
RAD ONC ARIA REFERENCE POINT ID: NORMAL
RAD ONC ARIA REFERENCE POINT SESSION DOSAGE GIVEN: 2 GY

## 2025-01-14 PROCEDURE — C1788 PORT, INDWELLING, IMP: HCPCS | Performed by: STUDENT IN AN ORGANIZED HEALTH CARE EDUCATION/TRAINING PROGRAM

## 2025-01-14 PROCEDURE — 25810000003 LACTATED RINGERS PER 1000 ML: Performed by: STUDENT IN AN ORGANIZED HEALTH CARE EDUCATION/TRAINING PROGRAM

## 2025-01-14 PROCEDURE — 25010000002 HEPARIN LOCK FLUSH PER 10 UNITS: Performed by: STUDENT IN AN ORGANIZED HEALTH CARE EDUCATION/TRAINING PROGRAM

## 2025-01-14 PROCEDURE — 25010000002 CEFAZOLIN PER 500 MG

## 2025-01-14 PROCEDURE — 93005 ELECTROCARDIOGRAM TRACING: CPT

## 2025-01-14 PROCEDURE — 25010000002 LIDOCAINE PF 2% 2 % SOLUTION

## 2025-01-14 PROCEDURE — 25010000002 LIDOCAINE 1% - EPINEPHRINE 1:100000 1 %-1:100000 SOLUTION: Performed by: STUDENT IN AN ORGANIZED HEALTH CARE EDUCATION/TRAINING PROGRAM

## 2025-01-14 PROCEDURE — 76000 FLUOROSCOPY <1 HR PHYS/QHP: CPT

## 2025-01-14 PROCEDURE — 25010000002 FENTANYL CITRATE (PF) 100 MCG/2ML SOLUTION

## 2025-01-14 PROCEDURE — 25510000001 IOPAMIDOL 61 % SOLUTION: Performed by: STUDENT IN AN ORGANIZED HEALTH CARE EDUCATION/TRAINING PROGRAM

## 2025-01-14 PROCEDURE — 77386: CPT | Performed by: RADIOLOGY

## 2025-01-14 PROCEDURE — 25010000002 PROPOFOL 1000 MG/100ML EMULSION

## 2025-01-14 PROCEDURE — 25810000003 SODIUM CHLORIDE PER 500 ML: Performed by: STUDENT IN AN ORGANIZED HEALTH CARE EDUCATION/TRAINING PROGRAM

## 2025-01-14 PROCEDURE — 77014 CHG CT GUIDANCE RADIATION THERAPY FLDS PLACEMENT: CPT | Performed by: RADIOLOGY

## 2025-01-14 PROCEDURE — 77336 RADIATION PHYSICS CONSULT: CPT | Performed by: RADIOLOGY

## 2025-01-14 DEVICE — POWERPORT CLEARVUE IMPLANTABLE PORT WITH ATTACHABLE 8F POLYURETHANE OPEN-ENDED SINGLE-LUMEN VENOUS CATHETER INTERMEDIATE KIT
Type: IMPLANTABLE DEVICE | Site: CHEST | Status: FUNCTIONAL
Brand: POWERPORT CLEARVUE

## 2025-01-14 RX ORDER — FAMOTIDINE 10 MG/ML
20 INJECTION, SOLUTION INTRAVENOUS ONCE
Start: 2025-01-30 | End: 2025-01-23

## 2025-01-14 RX ORDER — FAMOTIDINE 10 MG/ML
20 INJECTION, SOLUTION INTRAVENOUS AS NEEDED
OUTPATIENT
Start: 2025-01-30

## 2025-01-14 RX ORDER — HYDROCORTISONE SODIUM SUCCINATE 100 MG/2ML
100 INJECTION INTRAMUSCULAR; INTRAVENOUS AS NEEDED
OUTPATIENT
Start: 2025-01-30

## 2025-01-14 RX ORDER — HYDROCODONE BITARTRATE AND ACETAMINOPHEN 5; 325 MG/1; MG/1
1 TABLET ORAL EVERY 4 HOURS PRN
Status: DISCONTINUED | OUTPATIENT
Start: 2025-01-14 | End: 2025-01-14 | Stop reason: HOSPADM

## 2025-01-14 RX ORDER — IBUPROFEN 600 MG/1
600 TABLET, FILM COATED ORAL EVERY 6 HOURS PRN
Status: DISCONTINUED | OUTPATIENT
Start: 2025-01-14 | End: 2025-01-14 | Stop reason: HOSPADM

## 2025-01-14 RX ORDER — IOPAMIDOL 612 MG/ML
INJECTION, SOLUTION INTRAVASCULAR AS NEEDED
Status: DISCONTINUED | OUTPATIENT
Start: 2025-01-14 | End: 2025-01-14 | Stop reason: HOSPADM

## 2025-01-14 RX ORDER — FENTANYL CITRATE 50 UG/ML
25 INJECTION, SOLUTION INTRAMUSCULAR; INTRAVENOUS
Status: DISCONTINUED | OUTPATIENT
Start: 2025-01-14 | End: 2025-01-14 | Stop reason: HOSPADM

## 2025-01-14 RX ORDER — HYDROCODONE BITARTRATE AND ACETAMINOPHEN 10; 325 MG/1; MG/1
1 TABLET ORAL EVERY 4 HOURS PRN
Status: DISCONTINUED | OUTPATIENT
Start: 2025-01-14 | End: 2025-01-14 | Stop reason: HOSPADM

## 2025-01-14 RX ORDER — LIDOCAINE HYDROCHLORIDE 20 MG/ML
INJECTION, SOLUTION EPIDURAL; INFILTRATION; INTRACAUDAL; PERINEURAL AS NEEDED
Status: DISCONTINUED | OUTPATIENT
Start: 2025-01-14 | End: 2025-01-14 | Stop reason: SURG

## 2025-01-14 RX ORDER — DEXAMETHASONE SODIUM PHOSPHATE 10 MG/ML
10 INJECTION INTRAMUSCULAR; INTRAVENOUS ONCE
Start: 2025-01-30 | End: 2025-01-23

## 2025-01-14 RX ORDER — OXYCODONE HYDROCHLORIDE 5 MG/1
5 TABLET ORAL EVERY 6 HOURS PRN
Qty: 5 TABLET | Refills: 0 | Status: SHIPPED | OUTPATIENT
Start: 2025-01-14

## 2025-01-14 RX ORDER — HEPARIN SODIUM (PORCINE) LOCK FLUSH IV SOLN 100 UNIT/ML 100 UNIT/ML
SOLUTION INTRAVENOUS AS NEEDED
Status: DISCONTINUED | OUTPATIENT
Start: 2025-01-14 | End: 2025-01-14 | Stop reason: HOSPADM

## 2025-01-14 RX ORDER — MAGNESIUM HYDROXIDE 1200 MG/15ML
LIQUID ORAL AS NEEDED
Status: DISCONTINUED | OUTPATIENT
Start: 2025-01-14 | End: 2025-01-14 | Stop reason: HOSPADM

## 2025-01-14 RX ORDER — LABETALOL HYDROCHLORIDE 5 MG/ML
5 INJECTION, SOLUTION INTRAVENOUS
Status: DISCONTINUED | OUTPATIENT
Start: 2025-01-14 | End: 2025-01-14 | Stop reason: HOSPADM

## 2025-01-14 RX ORDER — SODIUM CHLORIDE 0.9 % (FLUSH) 0.9 %
10 SYRINGE (ML) INJECTION AS NEEDED
Status: DISCONTINUED | OUTPATIENT
Start: 2025-01-14 | End: 2025-01-14 | Stop reason: HOSPADM

## 2025-01-14 RX ORDER — SODIUM CHLORIDE 9 MG/ML
INJECTION, SOLUTION INTRAVENOUS AS NEEDED
Status: DISCONTINUED | OUTPATIENT
Start: 2025-01-14 | End: 2025-01-14 | Stop reason: HOSPADM

## 2025-01-14 RX ORDER — LIDOCAINE HYDROCHLORIDE 10 MG/ML
0.5 INJECTION, SOLUTION EPIDURAL; INFILTRATION; INTRACAUDAL; PERINEURAL ONCE AS NEEDED
Status: DISCONTINUED | OUTPATIENT
Start: 2025-01-14 | End: 2025-01-14 | Stop reason: HOSPADM

## 2025-01-14 RX ORDER — ONDANSETRON 2 MG/ML
4 INJECTION INTRAMUSCULAR; INTRAVENOUS ONCE AS NEEDED
Status: DISCONTINUED | OUTPATIENT
Start: 2025-01-14 | End: 2025-01-14 | Stop reason: HOSPADM

## 2025-01-14 RX ORDER — SODIUM CHLORIDE, SODIUM LACTATE, POTASSIUM CHLORIDE, CALCIUM CHLORIDE 600; 310; 30; 20 MG/100ML; MG/100ML; MG/100ML; MG/100ML
1000 INJECTION, SOLUTION INTRAVENOUS CONTINUOUS
Status: DISCONTINUED | OUTPATIENT
Start: 2025-01-14 | End: 2025-01-14 | Stop reason: HOSPADM

## 2025-01-14 RX ORDER — PALONOSETRON 0.05 MG/ML
0.25 INJECTION, SOLUTION INTRAVENOUS ONCE
OUTPATIENT
Start: 2025-01-30

## 2025-01-14 RX ORDER — LIDOCAINE HYDROCHLORIDE AND EPINEPHRINE 10; 10 MG/ML; UG/ML
INJECTION, SOLUTION INFILTRATION; PERINEURAL AS NEEDED
Status: DISCONTINUED | OUTPATIENT
Start: 2025-01-14 | End: 2025-01-14 | Stop reason: HOSPADM

## 2025-01-14 RX ORDER — SODIUM CHLORIDE 9 MG/ML
20 INJECTION, SOLUTION INTRAVENOUS ONCE
OUTPATIENT
Start: 2025-01-30

## 2025-01-14 RX ORDER — HYDROCODONE BITARTRATE AND ACETAMINOPHEN 7.5; 325 MG/1; MG/1
1 TABLET ORAL ONCE AS NEEDED
Status: DISCONTINUED | OUTPATIENT
Start: 2025-01-14 | End: 2025-01-14 | Stop reason: HOSPADM

## 2025-01-14 RX ORDER — FENTANYL CITRATE 50 UG/ML
INJECTION, SOLUTION INTRAMUSCULAR; INTRAVENOUS AS NEEDED
Status: DISCONTINUED | OUTPATIENT
Start: 2025-01-14 | End: 2025-01-14 | Stop reason: SURG

## 2025-01-14 RX ORDER — PROPOFOL 10 MG/ML
INJECTION, EMULSION INTRAVENOUS CONTINUOUS PRN
Status: DISCONTINUED | OUTPATIENT
Start: 2025-01-14 | End: 2025-01-14 | Stop reason: SURG

## 2025-01-14 RX ORDER — SODIUM CHLORIDE 0.9 % (FLUSH) 0.9 %
3 SYRINGE (ML) INJECTION AS NEEDED
Status: DISCONTINUED | OUTPATIENT
Start: 2025-01-14 | End: 2025-01-14 | Stop reason: HOSPADM

## 2025-01-14 RX ORDER — NALOXONE HCL 0.4 MG/ML
0.4 VIAL (ML) INJECTION AS NEEDED
Status: DISCONTINUED | OUTPATIENT
Start: 2025-01-14 | End: 2025-01-14 | Stop reason: HOSPADM

## 2025-01-14 RX ORDER — FENTANYL CITRATE 50 UG/ML
50 INJECTION, SOLUTION INTRAMUSCULAR; INTRAVENOUS
Status: DISCONTINUED | OUTPATIENT
Start: 2025-01-14 | End: 2025-01-14 | Stop reason: HOSPADM

## 2025-01-14 RX ORDER — FLUMAZENIL 0.1 MG/ML
0.2 INJECTION INTRAVENOUS AS NEEDED
Status: DISCONTINUED | OUTPATIENT
Start: 2025-01-14 | End: 2025-01-14 | Stop reason: HOSPADM

## 2025-01-14 RX ORDER — SODIUM CHLORIDE 0.9 % (FLUSH) 0.9 %
10 SYRINGE (ML) INJECTION EVERY 12 HOURS SCHEDULED
Status: DISCONTINUED | OUTPATIENT
Start: 2025-01-14 | End: 2025-01-14 | Stop reason: HOSPADM

## 2025-01-14 RX ORDER — DEXTROSE MONOHYDRATE 25 G/50ML
12.5 INJECTION, SOLUTION INTRAVENOUS AS NEEDED
Status: DISCONTINUED | OUTPATIENT
Start: 2025-01-14 | End: 2025-01-14 | Stop reason: HOSPADM

## 2025-01-14 RX ORDER — DIPHENHYDRAMINE HYDROCHLORIDE 50 MG/ML
50 INJECTION INTRAMUSCULAR; INTRAVENOUS AS NEEDED
OUTPATIENT
Start: 2025-01-30

## 2025-01-14 RX ADMIN — CEFAZOLIN 2000 MG: 2 INJECTION, POWDER, FOR SOLUTION INTRAMUSCULAR; INTRAVENOUS at 13:09

## 2025-01-14 RX ADMIN — PROPOFOL 150 MCG/KG/MIN: 10 INJECTION, EMULSION INTRAVENOUS at 13:07

## 2025-01-14 RX ADMIN — LIDOCAINE HYDROCHLORIDE 50 MG: 20 INJECTION, SOLUTION EPIDURAL; INFILTRATION; INTRACAUDAL; PERINEURAL at 13:07

## 2025-01-14 RX ADMIN — FENTANYL CITRATE 50 MCG: 50 INJECTION, SOLUTION INTRAMUSCULAR; INTRAVENOUS at 13:04

## 2025-01-14 RX ADMIN — SODIUM CHLORIDE, POTASSIUM CHLORIDE, SODIUM LACTATE AND CALCIUM CHLORIDE 1000 ML: 600; 310; 30; 20 INJECTION, SOLUTION INTRAVENOUS at 12:23

## 2025-01-14 RX ADMIN — FENTANYL CITRATE 50 MCG: 50 INJECTION, SOLUTION INTRAMUSCULAR; INTRAVENOUS at 13:18

## 2025-01-14 NOTE — OP NOTE
GENERAL SURGERY OPERATIVE NOTE    Operative Date: 01/14/25    Operating Room Number: 4    Patient name: Deana Stack    Preoperative diagnosis: Left side lung cancer, need for chemotherapy, need for port    Postoperative diagnosis: Same    Procedure performed: Right subclavian single-lumen port placement    Surgeon: Osvaldo Lemus MD    Circulator: Odette Gutierrez RN  Scrub Person: Carrington Strickland  Assistant: Shila Jc CST  Assistant: Shila Jc CST    Anesthesia: Monitored Local Anesthesia with Sedation    Indications: This is a 64 y.o. female presenting with newly discovered lung cancer in need of port placement to facilitate adjuvant therapy.  We have recommended right subclavian single-lumen port placement and the patient is agreeable and elects to proceed.       Findings:   -Right side subclavian single-lumen Port-A-Cath placement with fluoroscopic guidance  -Catheter tip placed within the cavoatrial junction, contrast shot to ensure appropriate placement      Procedure: The patient was seen and assessed in the preoperative holding area. Signed consents were obtained after a full discussion of the risks, benefits, and alternative therapies. The patient was then transported to the operating theatre, transferred to the operating table under her own power, monitored anesthesia was induced, and the patient was placed on supplemental oxygen as needed via Ventimask. A timeout was performed, the patient's identity and the proposed procedure were confirmed; all present agreed and elected to proceed.    The bilateral chest and neck was prepped and draped in the usual sterile fashion using chlorhexidine.  The right subclavian vein was accessed with an 18-gauge finder needle, and was evidence with return of dark nonpulsatile blood; the provided wire was then placed, and placement at the cavoatrial junction was confirmed by fluoroscopy.  The percutaneous introducer sheath was then placed over the  wire into the vessel.  To confirm placement I shot a couple runs with IV contrast.  A subcutaneous pocket was made several centimeters inferior to the access site with electrocautery and blunt dissection.  The appropriate catheter length was measured and trimmed, and then connected to the port in typical fashion; this was then tunneled through from the pocket to the access site using the provided tunneler.  The catheter was then placed into the sheath, and the peel-away sheath was removed in the typical fashion.  Fluoroscopy confirmed appropriate placement at the cavoatrial junction.  The port was then flushed with normal saline and flushed easily.  It was placed in the previously made pocket, secured to the underlying fascia with a pair of 2-0 Prolene sutures in the typical fashion.  Subcutaneous tissue overlying the port was then closed with a running suture using 3-0 Vicryl.  An additional layer of deep dermals was placed with interrupted 3-0 Vicryl.  A running subcuticular Monocryl suture was placed to close the skin.  A deep dermal 3-0 Vicryl was used to close the access site.  Both sites were dressed with skin glue.  At the conclusion of the case, the port was instilled with heparin per protocol.        At this point, the procedure was concluded, the patient was allowed to emerge from anesthesia,and was transported to PACU in stable condition.     I was present for the entirety of the procedure.     Complications: None immediately      EBL: Minimal     Specimens: None     Drains: None     Implants: Single lumen port     Dispo: PACU-anticipate discharge home following recovery from anesthesia and chest x-ray confirming no pneumothorax postoperatively             Osvaldo Lemus MD  1/14/2025   16:50 CST

## 2025-01-14 NOTE — BRIEF OP NOTE
INSERTION VENOUS ACCESS DEVICE  Progress Note    Deana Stack  1/14/2025    Pre-op Diagnosis:   Encounter for care related to Port-a-Cath [Z45.2]       Post-Op Diagnosis Codes:     * Encounter for care related to Port-a-Cath [Z45.2]    Procedure/CPT® Codes:      Procedure(s):  Single Lumen Port-a-cath insertion with flouroscopy              Surgeon(s):  Osvaldo Lemus MD    Anesthesia: Monitored Anesthesia Care    Staff:   Circulator: Odette Gutierrez RN  Scrub Person: Carrington Strickland  Assistant: Shila Jc CST  Assistant: Shila Jc CST      Estimated Blood Loss: minimal    Urine Voided: * No values recorded between 1/14/2025  1:04 PM and 1/14/2025  1:59 PM *    Specimens:                None          Drains: * No LDAs found *    Findings:   -Right side subclavian single-lumen Port-A-Cath placement with fluoroscopic guidance  -Catheter tip placed within the cavoatrial junction, contrast shot to ensure appropriate placement      Complications: None immediately    Assistant: Shila Jc CST  was responsible for performing the following activities: Retraction, Suturing, and Closing and their skilled assistance was necessary for the success of this case.    Osvaldo Lemus MD     Date: 1/14/2025  Time: 14:00 CST

## 2025-01-14 NOTE — ANESTHESIA PREPROCEDURE EVALUATION
Anesthesia Evaluation     Patient summary reviewed   no history of anesthetic complications:   NPO Solid Status: > 8 hours             Airway   Mallampati: II  Dental      Pulmonary    (+) a smoker Current, lung cancer,  (-) COPD, asthma, sleep apnea  Cardiovascular   Exercise tolerance: good (4-7 METS)    (-) pacemaker, past MI, angina, cardiac stents      Neuro/Psych  (-) seizures, TIA, CVA  GI/Hepatic/Renal/Endo    (+) GERD  (-) liver disease, no renal disease, diabetes    Musculoskeletal     Abdominal    Substance History      OB/GYN          Other                      Anesthesia Plan    ASA 3     MAC     intravenous induction     Anesthetic plan, risks, benefits, and alternatives have been provided, discussed and informed consent has been obtained with: patient.      CODE STATUS:

## 2025-01-14 NOTE — DISCHARGE INSTRUCTIONS
[facility] General Surgery Discharge Instructions  Patient Name: Deana Stack  MRN: [id number]  YOB: 1960  Admit Date: 01/14/25      Admitting Diagnosis   Need for port placement      Procedures/Surgery:   01/14/25: Right subclavian single-lumen port placement      Home Medications:    Pain medication:   - Take 1000mg of tylenol every 8 hours for 3 days. After three days, take it only as needed.  - Take 200mg of ibuprofen (motrin) every 8 hours for 3 days. After three days, take it only as needed.   - You have a prescription for a narcotic. Take these only as needed after you have taken the tylenol and ibuprofen. If you are taking the roxicodone, make sure to take a stool softener (colace) with it as it can cause constipation.       Wound Care/Drains   - you have surgical adhesive covering your incisions; leave intact and do not peel it off. This will fall off on its own anywhere from 10days to 4 weeks.    - No swimming/soaking/bathing/wading or wearing waders for 2 weeks to allow incisions to heal.   - You may shower with surgical adhesive in place.  Allow warm soapy water to run over the incisions, rinse thoroughly, do not scrub, and pat dry gently.    Activity     :   - You may slowly increase your activity after surgery beginning with walking the day of surgery, and increasing the distance slowly.  - No heavy lifting with your right arm-this means no more than 15 pounds for at least 2 weeks following your operation.    - Do not drive, use power tools, or operate machinery (including lawn equipment) while taking pain medication    Follow-up Visits     :   - Schedule an appointment to see Dr. Lemus in his clinic in 2 weeks.  - If you have any concerns before then, call my office at [phone] -s bandaruld you have concerns about bleeding, a fever greater than 102F, lightheadedness, dizziness, chest pain, shortness of breath, you should call my office as above or proceed to an emergency  department closest to you for evaluation.      Osvaldo Lemus MD  1/14/2025   14:01 CST

## 2025-01-14 NOTE — ANESTHESIA POSTPROCEDURE EVALUATION
"Patient: Deana Stack    Procedure Summary       Date: 01/14/25 Room / Location:  [facility] OR 04 /  [facility]    Anesthesia Start: 1303 Anesthesia Stop: 1403    Procedure: Single Lumen Port-a-cath insertion with flouroscopy (Chin to Nipples) Diagnosis:       Encounter for care related to Port-a-Cath      (Encounter for care related to Port-a-Cath [Z45.2])    Surgeons: Osvaldo Lemus MD Provider: Allan Orellana CRNA    Anesthesia Type: MAC ASA Status: 3            Anesthesia Type: MAC    Vitals  Vitals Value Taken Time   /83 01/14/25 1403   Temp     Pulse 93 01/14/25 1405   Resp     SpO2 100 % 01/14/25 1405   Vitals shown include unfiled device data.        Post Anesthesia Care and Evaluation    Patient location during evaluation: PHASE II  Patient participation: complete - patient participated  Level of consciousness: awake and alert  Pain management: adequate    Airway patency: patent  Anesthetic complications: No anesthetic complications  PONV Status: none  Cardiovascular status: acceptable and blood pressure returned to baseline  Respiratory status: acceptable  Hydration status: acceptable    Comments: Blood pressure 121/80, pulse 89, temperature 97.6 °F (36.4 °C), temperature source Temporal, resp. rate 18, height 159 cm (62.6\"), weight 62.1 kg (136 lb 14.5 oz), SpO2 98%    No anesthesia care post op    "

## 2025-01-15 ENCOUNTER — HOSPITAL ENCOUNTER (OUTPATIENT)
Dept: RADIATION ONCOLOGY | Facility: HOSPITAL | Age: 65
Discharge: HOME | End: 2025-01-15

## 2025-01-15 ENCOUNTER — TELEPHONE (OUTPATIENT)
Dept: SURGERY | Facility: CLINIC | Age: 65
End: 2025-01-15
Payer: MEDICARE

## 2025-01-15 LAB
BEAKER LAB AP INTRAOPERATIVE CONSULTATION: NORMAL
CYTO UR: NORMAL
LAB AP CASE REPORT: NORMAL
LAB AP DIAGNOSIS COMMENT: NORMAL
Lab: NORMAL
PATH REPORT.ADDENDUM SPEC: NORMAL
PATH REPORT.FINAL DX SPEC: NORMAL
PATH REPORT.GROSS SPEC: NORMAL
QT INTERVAL: 358 MS
QTC INTERVAL: 433 MS
RAD ONC ARIA COURSE ID: NORMAL
RAD ONC ARIA COURSE INTENT: NORMAL
RAD ONC ARIA COURSE LAST TREATMENT DATE: NORMAL
RAD ONC ARIA COURSE START DATE: NORMAL
RAD ONC ARIA COURSE TREATMENT ELAPSED DAYS: 16
RAD ONC ARIA FIRST TREATMENT DATE: NORMAL
RAD ONC ARIA PLAN FRACTIONS TREATED TO DATE: 11
RAD ONC ARIA PLAN ID: NORMAL
RAD ONC ARIA PLAN PRESCRIBED DOSE PER FRACTION: 2 GY
RAD ONC ARIA PLAN PRIMARY REFERENCE POINT: NORMAL
RAD ONC ARIA PLAN TOTAL FRACTIONS PRESCRIBED: 27
RAD ONC ARIA PLAN TOTAL PRESCRIBED DOSE: 5400 CGY
RAD ONC ARIA REFERENCE POINT DOSAGE GIVEN TO DATE: 22 GY
RAD ONC ARIA REFERENCE POINT ID: NORMAL
RAD ONC ARIA REFERENCE POINT SESSION DOSAGE GIVEN: 2 GY

## 2025-01-15 PROCEDURE — 77386: CPT | Performed by: RADIOLOGY

## 2025-01-15 PROCEDURE — 77014 CHG CT GUIDANCE RADIATION THERAPY FLDS PLACEMENT: CPT | Performed by: RADIOLOGY

## 2025-01-15 PROCEDURE — 77427 RADIATION TX MANAGEMENT X5: CPT | Performed by: RADIOLOGY

## 2025-01-15 RX ORDER — LIDOCAINE/PRILOCAINE 2.5 %-2.5%
CREAM (GRAM) TOPICAL
Qty: 1 EACH | Refills: 1 | Status: SHIPPED | OUTPATIENT
Start: 2025-01-15

## 2025-01-15 RX ORDER — PROCHLORPERAZINE MALEATE 10 MG
10 TABLET ORAL EVERY 6 HOURS PRN
Qty: 40 TABLET | Refills: 1 | Status: SHIPPED | OUTPATIENT
Start: 2025-01-15

## 2025-01-15 NOTE — TELEPHONE ENCOUNTER
Post OP phone call visit:    Type of surgery: Single lumen port-a-cath insertion.   How are you feeling? Doing good. Already had my treatment.   Are you having any pain or Nausea? No pain. No nausea.   Do you have a normal appetite? Okay.   How is your activity? Okay.   Any drainage or fever? No redness. No drainage. No fever.

## 2025-01-16 ENCOUNTER — LAB (OUTPATIENT)
Dept: LAB | Facility: HOSPITAL | Age: 65
End: 2025-01-16
Payer: MEDICARE

## 2025-01-16 ENCOUNTER — HOSPITAL ENCOUNTER (OUTPATIENT)
Dept: RADIATION ONCOLOGY | Facility: HOSPITAL | Age: 65
Discharge: HOME | End: 2025-01-16

## 2025-01-16 ENCOUNTER — INFUSION (OUTPATIENT)
Dept: ONCOLOGY | Facility: HOSPITAL | Age: 65
End: 2025-01-16
Payer: MEDICARE

## 2025-01-16 VITALS
DIASTOLIC BLOOD PRESSURE: 63 MMHG | RESPIRATION RATE: 16 BRPM | HEART RATE: 99 BPM | SYSTOLIC BLOOD PRESSURE: 130 MMHG | BODY MASS INDEX: 25.58 KG/M2 | HEIGHT: 62 IN | OXYGEN SATURATION: 99 % | TEMPERATURE: 98 F | WEIGHT: 139 LBS

## 2025-01-16 DIAGNOSIS — C34.12 MALIGNANT NEOPLASM OF UPPER LOBE OF LEFT LUNG: Primary | ICD-10-CM

## 2025-01-16 DIAGNOSIS — C34.12 MALIGNANT NEOPLASM OF UPPER LOBE OF LEFT LUNG: ICD-10-CM

## 2025-01-16 DIAGNOSIS — Z45.2 ENCOUNTER FOR CARE RELATED TO PORT-A-CATH: ICD-10-CM

## 2025-01-16 LAB
ALBUMIN SERPL-MCNC: 4.2 G/DL (ref 3.5–5.2)
ALBUMIN/GLOB SERPL: 1.7 G/DL
ALP SERPL-CCNC: 80 U/L (ref 39–117)
ALT SERPL W P-5'-P-CCNC: 14 U/L (ref 1–33)
ANION GAP SERPL CALCULATED.3IONS-SCNC: 9 MMOL/L (ref 5–15)
AST SERPL-CCNC: 17 U/L (ref 1–32)
BASOPHILS # BLD AUTO: 0.03 10*3/MM3 (ref 0–0.2)
BASOPHILS NFR BLD AUTO: 0.5 % (ref 0–1.5)
BILIRUB SERPL-MCNC: <0.2 MG/DL (ref 0–1.2)
BUN SERPL-MCNC: 13 MG/DL (ref 8–23)
BUN/CREAT SERPL: 24.5 (ref 7–25)
CALCIUM SPEC-SCNC: 9.2 MG/DL (ref 8.6–10.5)
CHLORIDE SERPL-SCNC: 104 MMOL/L (ref 98–107)
CO2 SERPL-SCNC: 28 MMOL/L (ref 22–29)
CREAT SERPL-MCNC: 0.53 MG/DL (ref 0.57–1)
DEPRECATED RDW RBC AUTO: 42 FL (ref 37–54)
EGFRCR SERPLBLD CKD-EPI 2021: 102.8 ML/MIN/1.73
EOSINOPHIL # BLD AUTO: 0.09 10*3/MM3 (ref 0–0.4)
EOSINOPHIL NFR BLD AUTO: 1.6 % (ref 0.3–6.2)
ERYTHROCYTE [DISTWIDTH] IN BLOOD BY AUTOMATED COUNT: 12.7 % (ref 12.3–15.4)
GLOBULIN UR ELPH-MCNC: 2.5 GM/DL
GLUCOSE SERPL-MCNC: 70 MG/DL (ref 65–99)
HCT VFR BLD AUTO: 39.5 % (ref 34–46.6)
HGB BLD-MCNC: 12.7 G/DL (ref 12–15.9)
IMM GRANULOCYTES # BLD AUTO: 0.02 10*3/MM3 (ref 0–0.05)
IMM GRANULOCYTES NFR BLD AUTO: 0.4 % (ref 0–0.5)
LYMPHOCYTES # BLD AUTO: 0.97 10*3/MM3 (ref 0.7–3.1)
LYMPHOCYTES NFR BLD AUTO: 17 % (ref 19.6–45.3)
MCH RBC QN AUTO: 28.7 PG (ref 26.6–33)
MCHC RBC AUTO-ENTMCNC: 32.2 G/DL (ref 31.5–35.7)
MCV RBC AUTO: 89.4 FL (ref 79–97)
MONOCYTES # BLD AUTO: 0.44 10*3/MM3 (ref 0.1–0.9)
MONOCYTES NFR BLD AUTO: 7.7 % (ref 5–12)
NEUTROPHILS NFR BLD AUTO: 4.14 10*3/MM3 (ref 1.7–7)
NEUTROPHILS NFR BLD AUTO: 72.8 % (ref 42.7–76)
NRBC BLD AUTO-RTO: 0 /100 WBC (ref 0–0.2)
PLATELET # BLD AUTO: 188 10*3/MM3 (ref 140–450)
PMV BLD AUTO: 9.4 FL (ref 6–12)
POTASSIUM SERPL-SCNC: 4.1 MMOL/L (ref 3.5–5.2)
PROT SERPL-MCNC: 6.7 G/DL (ref 6–8.5)
RAD ONC ARIA COURSE ID: NORMAL
RAD ONC ARIA COURSE INTENT: NORMAL
RAD ONC ARIA COURSE LAST TREATMENT DATE: NORMAL
RAD ONC ARIA COURSE START DATE: NORMAL
RAD ONC ARIA COURSE TREATMENT ELAPSED DAYS: 17
RAD ONC ARIA FIRST TREATMENT DATE: NORMAL
RAD ONC ARIA PLAN FRACTIONS TREATED TO DATE: 12
RAD ONC ARIA PLAN ID: NORMAL
RAD ONC ARIA PLAN PRESCRIBED DOSE PER FRACTION: 2 GY
RAD ONC ARIA PLAN PRIMARY REFERENCE POINT: NORMAL
RAD ONC ARIA PLAN TOTAL FRACTIONS PRESCRIBED: 27
RAD ONC ARIA PLAN TOTAL PRESCRIBED DOSE: 5400 CGY
RAD ONC ARIA REFERENCE POINT DOSAGE GIVEN TO DATE: 24 GY
RAD ONC ARIA REFERENCE POINT ID: NORMAL
RAD ONC ARIA REFERENCE POINT SESSION DOSAGE GIVEN: 2 GY
RBC # BLD AUTO: 4.42 10*6/MM3 (ref 3.77–5.28)
SODIUM SERPL-SCNC: 141 MMOL/L (ref 136–145)
WBC NRBC COR # BLD AUTO: 5.69 10*3/MM3 (ref 3.4–10.8)

## 2025-01-16 PROCEDURE — 25010000002 PACLITAXEL PER 1 MG: Performed by: INTERNAL MEDICINE

## 2025-01-16 PROCEDURE — 96367 TX/PROPH/DG ADDL SEQ IV INF: CPT

## 2025-01-16 PROCEDURE — 96413 CHEMO IV INFUSION 1 HR: CPT

## 2025-01-16 PROCEDURE — A9270 NON-COVERED ITEM OR SERVICE: HCPCS | Performed by: INTERNAL MEDICINE

## 2025-01-16 PROCEDURE — 63710000001 APREPITANT PER 5 MG: Performed by: INTERNAL MEDICINE

## 2025-01-16 PROCEDURE — 25010000002 PALONOSETRON PER 25 MCG: Performed by: INTERNAL MEDICINE

## 2025-01-16 PROCEDURE — 96417 CHEMO IV INFUS EACH ADDL SEQ: CPT

## 2025-01-16 PROCEDURE — 77014 CHG CT GUIDANCE RADIATION THERAPY FLDS PLACEMENT: CPT | Performed by: RADIOLOGY

## 2025-01-16 PROCEDURE — 25010000002 DEXAMETHASONE PER 1 MG: Performed by: INTERNAL MEDICINE

## 2025-01-16 PROCEDURE — 25010000002 HEPARIN LOCK FLUSH PER 10 UNITS: Performed by: INTERNAL MEDICINE

## 2025-01-16 PROCEDURE — 85025 COMPLETE CBC W/AUTO DIFF WBC: CPT

## 2025-01-16 PROCEDURE — 96375 TX/PRO/DX INJ NEW DRUG ADDON: CPT

## 2025-01-16 PROCEDURE — 25810000003 SODIUM CHLORIDE 0.9 % SOLUTION: Performed by: INTERNAL MEDICINE

## 2025-01-16 PROCEDURE — 25010000002 DIPHENHYDRAMINE PER 50 MG: Performed by: INTERNAL MEDICINE

## 2025-01-16 PROCEDURE — 77386: CPT | Performed by: RADIOLOGY

## 2025-01-16 PROCEDURE — 80053 COMPREHEN METABOLIC PANEL: CPT

## 2025-01-16 PROCEDURE — 36415 COLL VENOUS BLD VENIPUNCTURE: CPT

## 2025-01-16 PROCEDURE — 25010000002 CARBOPLATIN PER 50 MG: Performed by: INTERNAL MEDICINE

## 2025-01-16 PROCEDURE — 25810000003 SODIUM CHLORIDE 0.9 % SOLUTION 250 ML FLEX CONT: Performed by: INTERNAL MEDICINE

## 2025-01-16 RX ORDER — HYDROCORTISONE SODIUM SUCCINATE 100 MG/2ML
100 INJECTION INTRAMUSCULAR; INTRAVENOUS AS NEEDED
Status: DISCONTINUED | OUTPATIENT
Start: 2025-01-16 | End: 2025-01-16 | Stop reason: HOSPADM

## 2025-01-16 RX ORDER — DIPHENHYDRAMINE HYDROCHLORIDE 50 MG/ML
50 INJECTION INTRAMUSCULAR; INTRAVENOUS AS NEEDED
Status: CANCELLED | OUTPATIENT
Start: 2025-01-16

## 2025-01-16 RX ORDER — APREPITANT 40 MG/1
80 CAPSULE ORAL ONCE
Status: COMPLETED | OUTPATIENT
Start: 2025-01-16 | End: 2025-01-16

## 2025-01-16 RX ORDER — DIPHENHYDRAMINE HYDROCHLORIDE 50 MG/ML
50 INJECTION INTRAMUSCULAR; INTRAVENOUS AS NEEDED
Status: DISCONTINUED | OUTPATIENT
Start: 2025-01-16 | End: 2025-01-16 | Stop reason: HOSPADM

## 2025-01-16 RX ORDER — SODIUM CHLORIDE 0.9 % (FLUSH) 0.9 %
10 SYRINGE (ML) INJECTION AS NEEDED
OUTPATIENT
Start: 2025-01-16

## 2025-01-16 RX ORDER — HYDROCORTISONE SODIUM SUCCINATE 100 MG/2ML
100 INJECTION INTRAMUSCULAR; INTRAVENOUS AS NEEDED
Status: CANCELLED | OUTPATIENT
Start: 2025-01-16

## 2025-01-16 RX ORDER — FAMOTIDINE 10 MG/ML
20 INJECTION, SOLUTION INTRAVENOUS ONCE
Status: COMPLETED | OUTPATIENT
Start: 2025-01-16 | End: 2025-01-16

## 2025-01-16 RX ORDER — SODIUM CHLORIDE 9 MG/ML
20 INJECTION, SOLUTION INTRAVENOUS ONCE
Status: COMPLETED | OUTPATIENT
Start: 2025-01-16 | End: 2025-01-16

## 2025-01-16 RX ORDER — DEXAMETHASONE SODIUM PHOSPHATE 10 MG/ML
10 INJECTION INTRAMUSCULAR; INTRAVENOUS ONCE
Status: CANCELLED
Start: 2025-01-16 | End: 2025-01-16

## 2025-01-16 RX ORDER — PALONOSETRON 0.05 MG/ML
0.25 INJECTION, SOLUTION INTRAVENOUS ONCE
Status: COMPLETED | OUTPATIENT
Start: 2025-01-16 | End: 2025-01-16

## 2025-01-16 RX ORDER — HEPARIN SODIUM (PORCINE) LOCK FLUSH IV SOLN 100 UNIT/ML 100 UNIT/ML
500 SOLUTION INTRAVENOUS AS NEEDED
OUTPATIENT
Start: 2025-01-16

## 2025-01-16 RX ORDER — HEPARIN SODIUM (PORCINE) LOCK FLUSH IV SOLN 100 UNIT/ML 100 UNIT/ML
500 SOLUTION INTRAVENOUS AS NEEDED
Status: DISCONTINUED | OUTPATIENT
Start: 2025-01-16 | End: 2025-01-16 | Stop reason: HOSPADM

## 2025-01-16 RX ORDER — SODIUM CHLORIDE 0.9 % (FLUSH) 0.9 %
10 SYRINGE (ML) INJECTION AS NEEDED
Status: DISCONTINUED | OUTPATIENT
Start: 2025-01-16 | End: 2025-01-16 | Stop reason: HOSPADM

## 2025-01-16 RX ORDER — DEXAMETHASONE SODIUM PHOSPHATE 10 MG/ML
10 INJECTION INTRAMUSCULAR; INTRAVENOUS ONCE
Status: COMPLETED | OUTPATIENT
Start: 2025-01-16 | End: 2025-01-16

## 2025-01-16 RX ORDER — FAMOTIDINE 10 MG/ML
20 INJECTION, SOLUTION INTRAVENOUS ONCE
Status: CANCELLED
Start: 2025-01-16 | End: 2025-01-16

## 2025-01-16 RX ORDER — FAMOTIDINE 10 MG/ML
20 INJECTION, SOLUTION INTRAVENOUS AS NEEDED
Status: CANCELLED | OUTPATIENT
Start: 2025-01-16

## 2025-01-16 RX ORDER — FAMOTIDINE 10 MG/ML
20 INJECTION, SOLUTION INTRAVENOUS AS NEEDED
Status: DISCONTINUED | OUTPATIENT
Start: 2025-01-16 | End: 2025-01-16 | Stop reason: HOSPADM

## 2025-01-16 RX ORDER — SODIUM CHLORIDE 9 MG/ML
20 INJECTION, SOLUTION INTRAVENOUS ONCE
Status: CANCELLED | OUTPATIENT
Start: 2025-01-16

## 2025-01-16 RX ORDER — PALONOSETRON 0.05 MG/ML
0.25 INJECTION, SOLUTION INTRAVENOUS ONCE
Status: CANCELLED | OUTPATIENT
Start: 2025-01-16

## 2025-01-16 RX ADMIN — APREPITANT 80 MG: 40 CAPSULE ORAL at 10:14

## 2025-01-16 RX ADMIN — PACLITAXEL 70 MG: 6 INJECTION, SOLUTION INTRAVENOUS at 11:25

## 2025-01-16 RX ADMIN — SODIUM CHLORIDE 20 ML/HR: 9 INJECTION, SOLUTION INTRAVENOUS at 09:32

## 2025-01-16 RX ADMIN — HEPARIN 500 UNITS: 100 SYRINGE at 13:48

## 2025-01-16 RX ADMIN — DEXAMETHASONE SODIUM PHOSPHATE 10 MG: 10 INJECTION INTRAMUSCULAR; INTRAVENOUS at 10:20

## 2025-01-16 RX ADMIN — PALONOSETRON HYDROCHLORIDE 0.25 MG: 0.25 INJECTION, SOLUTION INTRAVENOUS at 10:14

## 2025-01-16 RX ADMIN — CARBOPLATIN 260 MG: 10 INJECTION INTRAVENOUS at 12:48

## 2025-01-16 RX ADMIN — DIPHENHYDRAMINE HYDROCHLORIDE 50 MG: 50 INJECTION, SOLUTION INTRAMUSCULAR; INTRAVENOUS at 11:00

## 2025-01-16 RX ADMIN — Medication 10 ML: at 13:46

## 2025-01-16 RX ADMIN — FAMOTIDINE 20 MG: 10 INJECTION INTRAVENOUS at 10:17

## 2025-01-17 ENCOUNTER — HOSPITAL ENCOUNTER (OUTPATIENT)
Dept: RADIATION ONCOLOGY | Facility: HOSPITAL | Age: 65
Setting detail: RADIATION/ONCOLOGY SERIES
Discharge: HOME | End: 2025-01-17
Payer: MEDICARE

## 2025-01-17 LAB
RAD ONC ARIA COURSE ID: NORMAL
RAD ONC ARIA COURSE INTENT: NORMAL
RAD ONC ARIA COURSE LAST TREATMENT DATE: NORMAL
RAD ONC ARIA COURSE START DATE: NORMAL
RAD ONC ARIA COURSE TREATMENT ELAPSED DAYS: 18
RAD ONC ARIA FIRST TREATMENT DATE: NORMAL
RAD ONC ARIA PLAN FRACTIONS TREATED TO DATE: 13
RAD ONC ARIA PLAN ID: NORMAL
RAD ONC ARIA PLAN PRESCRIBED DOSE PER FRACTION: 2 GY
RAD ONC ARIA PLAN PRIMARY REFERENCE POINT: NORMAL
RAD ONC ARIA PLAN TOTAL FRACTIONS PRESCRIBED: 27
RAD ONC ARIA PLAN TOTAL PRESCRIBED DOSE: 5400 CGY
RAD ONC ARIA REFERENCE POINT DOSAGE GIVEN TO DATE: 26 GY
RAD ONC ARIA REFERENCE POINT ID: NORMAL
RAD ONC ARIA REFERENCE POINT SESSION DOSAGE GIVEN: 2 GY

## 2025-01-17 PROCEDURE — 77386: CPT | Performed by: RADIOLOGY

## 2025-01-17 PROCEDURE — 77014 CHG CT GUIDANCE RADIATION THERAPY FLDS PLACEMENT: CPT | Performed by: RADIOLOGY

## 2025-01-20 ENCOUNTER — HOSPITAL ENCOUNTER (OUTPATIENT)
Dept: RADIATION ONCOLOGY | Facility: HOSPITAL | Age: 65
Discharge: HOME | End: 2025-01-20
Payer: MEDICARE

## 2025-01-20 LAB
RAD ONC ARIA COURSE ID: NORMAL
RAD ONC ARIA COURSE INTENT: NORMAL
RAD ONC ARIA COURSE LAST TREATMENT DATE: NORMAL
RAD ONC ARIA COURSE START DATE: NORMAL
RAD ONC ARIA COURSE TREATMENT ELAPSED DAYS: 21
RAD ONC ARIA FIRST TREATMENT DATE: NORMAL
RAD ONC ARIA PLAN FRACTIONS TREATED TO DATE: 14
RAD ONC ARIA PLAN ID: NORMAL
RAD ONC ARIA PLAN PRESCRIBED DOSE PER FRACTION: 2 GY
RAD ONC ARIA PLAN PRIMARY REFERENCE POINT: NORMAL
RAD ONC ARIA PLAN TOTAL FRACTIONS PRESCRIBED: 27
RAD ONC ARIA PLAN TOTAL PRESCRIBED DOSE: 5400 CGY
RAD ONC ARIA REFERENCE POINT DOSAGE GIVEN TO DATE: 28 GY
RAD ONC ARIA REFERENCE POINT ID: NORMAL
RAD ONC ARIA REFERENCE POINT SESSION DOSAGE GIVEN: 2 GY

## 2025-01-20 PROCEDURE — 77386: CPT | Performed by: RADIOLOGY

## 2025-01-20 PROCEDURE — 77014 CHG CT GUIDANCE RADIATION THERAPY FLDS PLACEMENT: CPT | Performed by: RADIOLOGY

## 2025-01-21 ENCOUNTER — HOSPITAL ENCOUNTER (OUTPATIENT)
Dept: RADIATION ONCOLOGY | Facility: HOSPITAL | Age: 65
Setting detail: RADIATION/ONCOLOGY SERIES
Discharge: HOME | End: 2025-01-21
Payer: MEDICARE

## 2025-01-21 LAB
RAD ONC ARIA COURSE ID: NORMAL
RAD ONC ARIA COURSE INTENT: NORMAL
RAD ONC ARIA COURSE LAST TREATMENT DATE: NORMAL
RAD ONC ARIA COURSE START DATE: NORMAL
RAD ONC ARIA COURSE TREATMENT ELAPSED DAYS: 22
RAD ONC ARIA FIRST TREATMENT DATE: NORMAL
RAD ONC ARIA PLAN FRACTIONS TREATED TO DATE: 15
RAD ONC ARIA PLAN ID: NORMAL
RAD ONC ARIA PLAN PRESCRIBED DOSE PER FRACTION: 2 GY
RAD ONC ARIA PLAN PRIMARY REFERENCE POINT: NORMAL
RAD ONC ARIA PLAN TOTAL FRACTIONS PRESCRIBED: 27
RAD ONC ARIA PLAN TOTAL PRESCRIBED DOSE: 5400 CGY
RAD ONC ARIA REFERENCE POINT DOSAGE GIVEN TO DATE: 30 GY
RAD ONC ARIA REFERENCE POINT ID: NORMAL
RAD ONC ARIA REFERENCE POINT SESSION DOSAGE GIVEN: 2 GY

## 2025-01-21 PROCEDURE — 77386: CPT | Performed by: RADIOLOGY

## 2025-01-21 PROCEDURE — 77336 RADIATION PHYSICS CONSULT: CPT | Performed by: RADIOLOGY

## 2025-01-21 PROCEDURE — 77014 CHG CT GUIDANCE RADIATION THERAPY FLDS PLACEMENT: CPT | Performed by: RADIOLOGY

## 2025-01-22 ENCOUNTER — HOSPITAL ENCOUNTER (OUTPATIENT)
Dept: RADIATION ONCOLOGY | Facility: HOSPITAL | Age: 65
Discharge: HOME | End: 2025-01-22

## 2025-01-22 LAB
RAD ONC ARIA COURSE ID: NORMAL
RAD ONC ARIA COURSE INTENT: NORMAL
RAD ONC ARIA COURSE LAST TREATMENT DATE: NORMAL
RAD ONC ARIA COURSE START DATE: NORMAL
RAD ONC ARIA COURSE TREATMENT ELAPSED DAYS: 23
RAD ONC ARIA FIRST TREATMENT DATE: NORMAL
RAD ONC ARIA PLAN FRACTIONS TREATED TO DATE: 16
RAD ONC ARIA PLAN ID: NORMAL
RAD ONC ARIA PLAN PRESCRIBED DOSE PER FRACTION: 2 GY
RAD ONC ARIA PLAN PRIMARY REFERENCE POINT: NORMAL
RAD ONC ARIA PLAN TOTAL FRACTIONS PRESCRIBED: 27
RAD ONC ARIA PLAN TOTAL PRESCRIBED DOSE: 5400 CGY
RAD ONC ARIA REFERENCE POINT DOSAGE GIVEN TO DATE: 32 GY
RAD ONC ARIA REFERENCE POINT ID: NORMAL
RAD ONC ARIA REFERENCE POINT SESSION DOSAGE GIVEN: 2 GY

## 2025-01-22 PROCEDURE — 77386: CPT | Performed by: RADIOLOGY

## 2025-01-22 PROCEDURE — 77014 CHG CT GUIDANCE RADIATION THERAPY FLDS PLACEMENT: CPT | Performed by: RADIOLOGY

## 2025-01-23 ENCOUNTER — HOSPITAL ENCOUNTER (OUTPATIENT)
Dept: RADIATION ONCOLOGY | Facility: HOSPITAL | Age: 65
Discharge: HOME | End: 2025-01-23

## 2025-01-23 ENCOUNTER — INFUSION (OUTPATIENT)
Dept: ONCOLOGY | Facility: HOSPITAL | Age: 65
End: 2025-01-23
Payer: MEDICARE

## 2025-01-23 ENCOUNTER — LAB (OUTPATIENT)
Dept: LAB | Facility: HOSPITAL | Age: 65
End: 2025-01-23
Payer: MEDICARE

## 2025-01-23 VITALS
OXYGEN SATURATION: 96 % | TEMPERATURE: 97.3 F | DIASTOLIC BLOOD PRESSURE: 66 MMHG | HEIGHT: 62 IN | RESPIRATION RATE: 16 BRPM | HEART RATE: 99 BPM | WEIGHT: 138.6 LBS | BODY MASS INDEX: 25.51 KG/M2 | SYSTOLIC BLOOD PRESSURE: 116 MMHG

## 2025-01-23 DIAGNOSIS — Z45.2 ENCOUNTER FOR CARE RELATED TO PORT-A-CATH: ICD-10-CM

## 2025-01-23 DIAGNOSIS — C34.12 MALIGNANT NEOPLASM OF UPPER LOBE OF LEFT LUNG: ICD-10-CM

## 2025-01-23 DIAGNOSIS — C34.12 MALIGNANT NEOPLASM OF UPPER LOBE OF LEFT LUNG: Primary | ICD-10-CM

## 2025-01-23 LAB
ALBUMIN SERPL-MCNC: 4.1 G/DL (ref 3.5–5.2)
ALBUMIN/GLOB SERPL: 1.6 G/DL
ALP SERPL-CCNC: 78 U/L (ref 39–117)
ALT SERPL W P-5'-P-CCNC: 18 U/L (ref 1–33)
ANION GAP SERPL CALCULATED.3IONS-SCNC: 7 MMOL/L (ref 5–15)
AST SERPL-CCNC: 15 U/L (ref 1–32)
BASOPHILS # BLD AUTO: 0.02 10*3/MM3 (ref 0–0.2)
BASOPHILS NFR BLD AUTO: 0.4 % (ref 0–1.5)
BILIRUB SERPL-MCNC: 0.2 MG/DL (ref 0–1.2)
BUN SERPL-MCNC: 13 MG/DL (ref 8–23)
BUN/CREAT SERPL: 20 (ref 7–25)
CALCIUM SPEC-SCNC: 9.1 MG/DL (ref 8.6–10.5)
CHLORIDE SERPL-SCNC: 100 MMOL/L (ref 98–107)
CO2 SERPL-SCNC: 28 MMOL/L (ref 22–29)
CREAT SERPL-MCNC: 0.65 MG/DL (ref 0.57–1)
DEPRECATED RDW RBC AUTO: 41.7 FL (ref 37–54)
EGFRCR SERPLBLD CKD-EPI 2021: 97.8 ML/MIN/1.73
EOSINOPHIL # BLD AUTO: 0.08 10*3/MM3 (ref 0–0.4)
EOSINOPHIL NFR BLD AUTO: 1.7 % (ref 0.3–6.2)
ERYTHROCYTE [DISTWIDTH] IN BLOOD BY AUTOMATED COUNT: 12.7 % (ref 12.3–15.4)
GLOBULIN UR ELPH-MCNC: 2.5 GM/DL
GLUCOSE SERPL-MCNC: 83 MG/DL (ref 65–99)
HCT VFR BLD AUTO: 40.3 % (ref 34–46.6)
HGB BLD-MCNC: 13.2 G/DL (ref 12–15.9)
IMM GRANULOCYTES # BLD AUTO: 0.03 10*3/MM3 (ref 0–0.05)
IMM GRANULOCYTES NFR BLD AUTO: 0.6 % (ref 0–0.5)
LYMPHOCYTES # BLD AUTO: 0.86 10*3/MM3 (ref 0.7–3.1)
LYMPHOCYTES NFR BLD AUTO: 18.6 % (ref 19.6–45.3)
MCH RBC QN AUTO: 29.6 PG (ref 26.6–33)
MCHC RBC AUTO-ENTMCNC: 32.8 G/DL (ref 31.5–35.7)
MCV RBC AUTO: 90.4 FL (ref 79–97)
MONOCYTES # BLD AUTO: 0.37 10*3/MM3 (ref 0.1–0.9)
MONOCYTES NFR BLD AUTO: 8 % (ref 5–12)
NEUTROPHILS NFR BLD AUTO: 3.27 10*3/MM3 (ref 1.7–7)
NEUTROPHILS NFR BLD AUTO: 70.7 % (ref 42.7–76)
NRBC BLD AUTO-RTO: 0 /100 WBC (ref 0–0.2)
PLATELET # BLD AUTO: 199 10*3/MM3 (ref 140–450)
PMV BLD AUTO: 9.4 FL (ref 6–12)
POTASSIUM SERPL-SCNC: 3.9 MMOL/L (ref 3.5–5.2)
PROT SERPL-MCNC: 6.6 G/DL (ref 6–8.5)
RAD ONC ARIA COURSE ID: NORMAL
RAD ONC ARIA COURSE INTENT: NORMAL
RAD ONC ARIA COURSE LAST TREATMENT DATE: NORMAL
RAD ONC ARIA COURSE START DATE: NORMAL
RAD ONC ARIA COURSE TREATMENT ELAPSED DAYS: 24
RAD ONC ARIA FIRST TREATMENT DATE: NORMAL
RAD ONC ARIA PLAN FRACTIONS TREATED TO DATE: 17
RAD ONC ARIA PLAN ID: NORMAL
RAD ONC ARIA PLAN PRESCRIBED DOSE PER FRACTION: 2 GY
RAD ONC ARIA PLAN PRIMARY REFERENCE POINT: NORMAL
RAD ONC ARIA PLAN TOTAL FRACTIONS PRESCRIBED: 27
RAD ONC ARIA PLAN TOTAL PRESCRIBED DOSE: 5400 CGY
RAD ONC ARIA REFERENCE POINT DOSAGE GIVEN TO DATE: 34 GY
RAD ONC ARIA REFERENCE POINT ID: NORMAL
RAD ONC ARIA REFERENCE POINT SESSION DOSAGE GIVEN: 2 GY
RBC # BLD AUTO: 4.46 10*6/MM3 (ref 3.77–5.28)
SODIUM SERPL-SCNC: 135 MMOL/L (ref 136–145)
WBC NRBC COR # BLD AUTO: 4.63 10*3/MM3 (ref 3.4–10.8)

## 2025-01-23 PROCEDURE — 96375 TX/PRO/DX INJ NEW DRUG ADDON: CPT

## 2025-01-23 PROCEDURE — 25010000002 DIPHENHYDRAMINE PER 50 MG: Performed by: INTERNAL MEDICINE

## 2025-01-23 PROCEDURE — 63710000001 APREPITANT PER 5 MG: Performed by: INTERNAL MEDICINE

## 2025-01-23 PROCEDURE — 25010000002 DEXAMETHASONE PER 1 MG: Performed by: INTERNAL MEDICINE

## 2025-01-23 PROCEDURE — 25810000003 SODIUM CHLORIDE 0.9 % SOLUTION 250 ML FLEX CONT: Performed by: INTERNAL MEDICINE

## 2025-01-23 PROCEDURE — 25010000002 PACLITAXEL PER 1 MG: Performed by: INTERNAL MEDICINE

## 2025-01-23 PROCEDURE — 85025 COMPLETE CBC W/AUTO DIFF WBC: CPT

## 2025-01-23 PROCEDURE — 25010000002 HEPARIN LOCK FLUSH PER 10 UNITS: Performed by: INTERNAL MEDICINE

## 2025-01-23 PROCEDURE — 77386: CPT | Performed by: RADIOLOGY

## 2025-01-23 PROCEDURE — 77014 CHG CT GUIDANCE RADIATION THERAPY FLDS PLACEMENT: CPT | Performed by: RADIOLOGY

## 2025-01-23 PROCEDURE — A9270 NON-COVERED ITEM OR SERVICE: HCPCS | Performed by: INTERNAL MEDICINE

## 2025-01-23 PROCEDURE — 96413 CHEMO IV INFUSION 1 HR: CPT

## 2025-01-23 PROCEDURE — 25010000002 PALONOSETRON PER 25 MCG: Performed by: INTERNAL MEDICINE

## 2025-01-23 PROCEDURE — 25010000002 CARBOPLATIN PER 50 MG: Performed by: INTERNAL MEDICINE

## 2025-01-23 PROCEDURE — 96417 CHEMO IV INFUS EACH ADDL SEQ: CPT

## 2025-01-23 PROCEDURE — 25810000003 SODIUM CHLORIDE 0.9 % SOLUTION: Performed by: INTERNAL MEDICINE

## 2025-01-23 PROCEDURE — 80053 COMPREHEN METABOLIC PANEL: CPT

## 2025-01-23 PROCEDURE — 36415 COLL VENOUS BLD VENIPUNCTURE: CPT

## 2025-01-23 RX ORDER — FAMOTIDINE 10 MG/ML
20 INJECTION, SOLUTION INTRAVENOUS AS NEEDED
Status: DISCONTINUED | OUTPATIENT
Start: 2025-01-23 | End: 2025-01-23 | Stop reason: HOSPADM

## 2025-01-23 RX ORDER — HEPARIN SODIUM (PORCINE) LOCK FLUSH IV SOLN 100 UNIT/ML 100 UNIT/ML
500 SOLUTION INTRAVENOUS AS NEEDED
Status: DISCONTINUED | OUTPATIENT
Start: 2025-01-23 | End: 2025-01-23 | Stop reason: HOSPADM

## 2025-01-23 RX ORDER — FAMOTIDINE 10 MG/ML
20 INJECTION, SOLUTION INTRAVENOUS ONCE
Status: COMPLETED | OUTPATIENT
Start: 2025-01-23 | End: 2025-01-23

## 2025-01-23 RX ORDER — DIPHENHYDRAMINE HYDROCHLORIDE 50 MG/ML
50 INJECTION INTRAMUSCULAR; INTRAVENOUS AS NEEDED
Status: DISCONTINUED | OUTPATIENT
Start: 2025-01-23 | End: 2025-01-23 | Stop reason: HOSPADM

## 2025-01-23 RX ORDER — APREPITANT 40 MG/1
80 CAPSULE ORAL ONCE
Status: COMPLETED | OUTPATIENT
Start: 2025-01-23 | End: 2025-01-23

## 2025-01-23 RX ORDER — PALONOSETRON 0.05 MG/ML
0.25 INJECTION, SOLUTION INTRAVENOUS ONCE
Status: COMPLETED | OUTPATIENT
Start: 2025-01-23 | End: 2025-01-23

## 2025-01-23 RX ORDER — DEXAMETHASONE SODIUM PHOSPHATE 10 MG/ML
10 INJECTION INTRAMUSCULAR; INTRAVENOUS ONCE
Status: COMPLETED | OUTPATIENT
Start: 2025-01-23 | End: 2025-01-23

## 2025-01-23 RX ORDER — SODIUM CHLORIDE 0.9 % (FLUSH) 0.9 %
10 SYRINGE (ML) INJECTION AS NEEDED
Status: DISCONTINUED | OUTPATIENT
Start: 2025-01-23 | End: 2025-01-23 | Stop reason: HOSPADM

## 2025-01-23 RX ORDER — SODIUM CHLORIDE 0.9 % (FLUSH) 0.9 %
10 SYRINGE (ML) INJECTION AS NEEDED
OUTPATIENT
Start: 2025-01-23

## 2025-01-23 RX ORDER — DIPHENHYDRAMINE HYDROCHLORIDE 50 MG/ML
50 INJECTION INTRAMUSCULAR; INTRAVENOUS ONCE
Status: COMPLETED | OUTPATIENT
Start: 2025-01-23 | End: 2025-01-23

## 2025-01-23 RX ORDER — HEPARIN SODIUM (PORCINE) LOCK FLUSH IV SOLN 100 UNIT/ML 100 UNIT/ML
500 SOLUTION INTRAVENOUS AS NEEDED
OUTPATIENT
Start: 2025-01-23

## 2025-01-23 RX ORDER — APREPITANT 80 MG/1
80 CAPSULE ORAL ONCE
Status: CANCELLED
Start: 2025-01-23 | End: 2025-01-23

## 2025-01-23 RX ORDER — HYDROCORTISONE SODIUM SUCCINATE 100 MG/2ML
100 INJECTION INTRAMUSCULAR; INTRAVENOUS AS NEEDED
Status: DISCONTINUED | OUTPATIENT
Start: 2025-01-23 | End: 2025-01-23 | Stop reason: HOSPADM

## 2025-01-23 RX ORDER — SODIUM CHLORIDE 9 MG/ML
20 INJECTION, SOLUTION INTRAVENOUS ONCE
Status: COMPLETED | OUTPATIENT
Start: 2025-01-23 | End: 2025-01-23

## 2025-01-23 RX ADMIN — HEPARIN 500 UNITS: 100 SYRINGE at 13:03

## 2025-01-23 RX ADMIN — FAMOTIDINE 20 MG: 10 INJECTION INTRAVENOUS at 09:26

## 2025-01-23 RX ADMIN — DIPHENHYDRAMINE HYDROCHLORIDE 50 MG: 50 INJECTION, SOLUTION INTRAMUSCULAR; INTRAVENOUS at 09:26

## 2025-01-23 RX ADMIN — PACLITAXEL 70 MG: 6 INJECTION, SOLUTION INTRAVENOUS at 10:59

## 2025-01-23 RX ADMIN — DEXAMETHASONE SODIUM PHOSPHATE 10 MG: 10 INJECTION INTRAMUSCULAR; INTRAVENOUS at 09:26

## 2025-01-23 RX ADMIN — SODIUM CHLORIDE 20 ML/HR: 9 INJECTION, SOLUTION INTRAVENOUS at 09:23

## 2025-01-23 RX ADMIN — PALONOSETRON HYDROCHLORIDE 0.25 MG: 0.25 INJECTION, SOLUTION INTRAVENOUS at 09:26

## 2025-01-23 RX ADMIN — APREPITANT 80 MG: 40 CAPSULE ORAL at 09:59

## 2025-01-23 RX ADMIN — CARBOPLATIN 220 MG: 10 INJECTION, SOLUTION INTRAVENOUS at 12:19

## 2025-01-23 RX ADMIN — Medication 10 ML: at 13:02

## 2025-01-24 ENCOUNTER — HOSPITAL ENCOUNTER (OUTPATIENT)
Dept: RADIATION ONCOLOGY | Facility: HOSPITAL | Age: 65
Discharge: HOME | End: 2025-01-24

## 2025-01-24 LAB
RAD ONC ARIA COURSE ID: NORMAL
RAD ONC ARIA COURSE INTENT: NORMAL
RAD ONC ARIA COURSE LAST TREATMENT DATE: NORMAL
RAD ONC ARIA COURSE START DATE: NORMAL
RAD ONC ARIA COURSE TREATMENT ELAPSED DAYS: 25
RAD ONC ARIA FIRST TREATMENT DATE: NORMAL
RAD ONC ARIA PLAN FRACTIONS TREATED TO DATE: 18
RAD ONC ARIA PLAN ID: NORMAL
RAD ONC ARIA PLAN PRESCRIBED DOSE PER FRACTION: 2 GY
RAD ONC ARIA PLAN PRIMARY REFERENCE POINT: NORMAL
RAD ONC ARIA PLAN TOTAL FRACTIONS PRESCRIBED: 27
RAD ONC ARIA PLAN TOTAL PRESCRIBED DOSE: 5400 CGY
RAD ONC ARIA REFERENCE POINT DOSAGE GIVEN TO DATE: 36 GY
RAD ONC ARIA REFERENCE POINT ID: NORMAL
RAD ONC ARIA REFERENCE POINT SESSION DOSAGE GIVEN: 2 GY

## 2025-01-24 PROCEDURE — 77386: CPT | Performed by: RADIOLOGY

## 2025-01-27 ENCOUNTER — HOSPITAL ENCOUNTER (OUTPATIENT)
Dept: RADIATION ONCOLOGY | Facility: HOSPITAL | Age: 65
Setting detail: RADIATION/ONCOLOGY SERIES
Discharge: HOME | End: 2025-01-27
Payer: MEDICARE

## 2025-01-27 LAB
RAD ONC ARIA COURSE ID: NORMAL
RAD ONC ARIA COURSE INTENT: NORMAL
RAD ONC ARIA COURSE LAST TREATMENT DATE: NORMAL
RAD ONC ARIA COURSE START DATE: NORMAL
RAD ONC ARIA COURSE TREATMENT ELAPSED DAYS: 28
RAD ONC ARIA FIRST TREATMENT DATE: NORMAL
RAD ONC ARIA PLAN FRACTIONS TREATED TO DATE: 19
RAD ONC ARIA PLAN ID: NORMAL
RAD ONC ARIA PLAN PRESCRIBED DOSE PER FRACTION: 2 GY
RAD ONC ARIA PLAN PRIMARY REFERENCE POINT: NORMAL
RAD ONC ARIA PLAN TOTAL FRACTIONS PRESCRIBED: 27
RAD ONC ARIA PLAN TOTAL PRESCRIBED DOSE: 5400 CGY
RAD ONC ARIA REFERENCE POINT DOSAGE GIVEN TO DATE: 38 GY
RAD ONC ARIA REFERENCE POINT ID: NORMAL
RAD ONC ARIA REFERENCE POINT SESSION DOSAGE GIVEN: 2 GY

## 2025-01-27 PROCEDURE — 77386: CPT | Performed by: RADIOLOGY

## 2025-01-27 NOTE — PROGRESS NOTES
"  [facility] Progress Note  Deana Stack  MRN: [id number]  Age: 64 y.o. female   YOB: 1960      SUBJECTIVE  History of Presenting Illness   Patient is a 64 y.o. female with left-sided lung cancer, need for chemo, who presented for port placement on 1/14/2025.  Tolerated procedure well, postoperative imaging demonstrated port in correct location.  Reports that she is doing very well, and is recovering appropriately, and infusions are proceeding without issue.      Physical Examination     Vitals:    01/28/25 0855   BP: 120/78   Pulse: 88   SpO2: 98%   Weight: 62.6 kg (138 lb)   Height: 157.5 cm (62\")       Estimated body mass index is 25.24 kg/m² as calculated from the following:    Height as of this encounter: 157.5 cm (62\").    Weight as of this encounter: 62.6 kg (138 lb).  PREVIOUS WEIGHTS:   Wt Readings from Last 5 Encounters:   01/28/25 62.6 kg (138 lb)   01/23/25 62.9 kg (138 lb 9.6 oz)   01/16/25 63 kg (139 lb)   01/14/25 62.1 kg (136 lb 14.5 oz)   01/08/25 61.7 kg (136 lb)       Physical Examination:  Gen: awake, alert, sitting up in chair in no acute distress  HEENT: NCAT, EOMI, anicteric sclerae  CV: RRR, normotensive  Resp: nonlabored on room air, sats appropriate  Chest: Access sites clean dry and intact, port sites clean dry and intact, there is a little bit of Vicryl suture that has extruded from the lateral portion, I have trimmed this off in clinic today  MSK: moves all four, no c/c/e  Neuro: no focal deficits, cranial nerves grossly intact  Psy:  appropriate, cooperative        Assessment/Plan   Deana Stack is a 64 y.o. female with lung cancer status post right subclavian single-lumen port placement on 1/14/2025.  She is doing well and recovering appropriately.  No need for scheduled follow-up, she knows to call if she has questions, concerns, wound issues, or would like the port removed in the future.      Smoking cessation: Everyday smoker-counseled " regarding cessation  Med rec complete: yes  VTE: VTE PPX is not indicated.    Time Spent: I spent 10 minutes caring for Deana Stack on this date of service. This time includes time, independent of any procedures, spent by me in the following activities: preparing for the clinic visit, reviewing tests, obtaining and/or reviewing a separately obtained history, performing a medically appropriate examination and/or evaluation, counseling and educating the patient/family/caregiver, ordering medications, tests, or procedures, and documenting information in the medical record.     Osvaldo Lemus MD  1/28/2025   09:35 CST

## 2025-01-28 ENCOUNTER — HOSPITAL ENCOUNTER (OUTPATIENT)
Dept: RADIATION ONCOLOGY | Facility: HOSPITAL | Age: 65
Discharge: HOME | End: 2025-01-28

## 2025-01-28 ENCOUNTER — OFFICE VISIT (OUTPATIENT)
Dept: SURGERY | Facility: CLINIC | Age: 65
End: 2025-01-28
Payer: MEDICARE

## 2025-01-28 VITALS
HEIGHT: 62 IN | WEIGHT: 138 LBS | DIASTOLIC BLOOD PRESSURE: 78 MMHG | HEART RATE: 88 BPM | OXYGEN SATURATION: 98 % | SYSTOLIC BLOOD PRESSURE: 120 MMHG | BODY MASS INDEX: 25.4 KG/M2

## 2025-01-28 DIAGNOSIS — C34.12 MALIGNANT NEOPLASM OF UPPER LOBE OF LEFT LUNG: Primary | ICD-10-CM

## 2025-01-28 DIAGNOSIS — Z45.2 ENCOUNTER FOR CARE RELATED TO PORT-A-CATH: Primary | ICD-10-CM

## 2025-01-28 LAB
RAD ONC ARIA COURSE ID: NORMAL
RAD ONC ARIA COURSE INTENT: NORMAL
RAD ONC ARIA COURSE LAST TREATMENT DATE: NORMAL
RAD ONC ARIA COURSE START DATE: NORMAL
RAD ONC ARIA COURSE TREATMENT ELAPSED DAYS: 29
RAD ONC ARIA FIRST TREATMENT DATE: NORMAL
RAD ONC ARIA PLAN FRACTIONS TREATED TO DATE: 20
RAD ONC ARIA PLAN ID: NORMAL
RAD ONC ARIA PLAN PRESCRIBED DOSE PER FRACTION: 2 GY
RAD ONC ARIA PLAN PRIMARY REFERENCE POINT: NORMAL
RAD ONC ARIA PLAN TOTAL FRACTIONS PRESCRIBED: 27
RAD ONC ARIA PLAN TOTAL PRESCRIBED DOSE: 5400 CGY
RAD ONC ARIA REFERENCE POINT DOSAGE GIVEN TO DATE: 40 GY
RAD ONC ARIA REFERENCE POINT ID: NORMAL
RAD ONC ARIA REFERENCE POINT SESSION DOSAGE GIVEN: 2 GY

## 2025-01-28 PROCEDURE — 1160F RVW MEDS BY RX/DR IN RCRD: CPT | Performed by: STUDENT IN AN ORGANIZED HEALTH CARE EDUCATION/TRAINING PROGRAM

## 2025-01-28 PROCEDURE — 1159F MED LIST DOCD IN RCRD: CPT | Performed by: STUDENT IN AN ORGANIZED HEALTH CARE EDUCATION/TRAINING PROGRAM

## 2025-01-28 PROCEDURE — 77336 RADIATION PHYSICS CONSULT: CPT | Performed by: RADIOLOGY

## 2025-01-28 PROCEDURE — 77386: CPT | Performed by: RADIOLOGY

## 2025-01-28 PROCEDURE — 99212 OFFICE O/P EST SF 10 MIN: CPT | Performed by: STUDENT IN AN ORGANIZED HEALTH CARE EDUCATION/TRAINING PROGRAM

## 2025-01-28 RX ORDER — DIPHENHYDRAMINE HYDROCHLORIDE 50 MG/ML
50 INJECTION INTRAMUSCULAR; INTRAVENOUS AS NEEDED
OUTPATIENT
Start: 2025-02-06

## 2025-01-28 RX ORDER — FAMOTIDINE 10 MG/ML
20 INJECTION, SOLUTION INTRAVENOUS AS NEEDED
OUTPATIENT
Start: 2025-02-06

## 2025-01-28 RX ORDER — FAMOTIDINE 10 MG/ML
20 INJECTION, SOLUTION INTRAVENOUS ONCE
Start: 2025-02-06 | End: 2025-02-06

## 2025-01-28 RX ORDER — HYDROCORTISONE SODIUM SUCCINATE 100 MG/2ML
100 INJECTION INTRAMUSCULAR; INTRAVENOUS AS NEEDED
OUTPATIENT
Start: 2025-02-06

## 2025-01-28 RX ORDER — PALONOSETRON 0.05 MG/ML
0.25 INJECTION, SOLUTION INTRAVENOUS ONCE
OUTPATIENT
Start: 2025-02-06

## 2025-01-28 RX ORDER — SODIUM CHLORIDE 9 MG/ML
20 INJECTION, SOLUTION INTRAVENOUS ONCE
OUTPATIENT
Start: 2025-02-06

## 2025-01-28 RX ORDER — DEXAMETHASONE SODIUM PHOSPHATE 10 MG/ML
10 INJECTION INTRAMUSCULAR; INTRAVENOUS ONCE
Start: 2025-02-06 | End: 2025-02-06

## 2025-01-28 RX ORDER — APREPITANT 80 MG/1
80 CAPSULE ORAL ONCE
Start: 2025-02-06 | End: 2025-02-06

## 2025-01-29 ENCOUNTER — HOSPITAL ENCOUNTER (OUTPATIENT)
Dept: RADIATION ONCOLOGY | Facility: HOSPITAL | Age: 65
Discharge: HOME | End: 2025-01-29

## 2025-01-29 LAB
RAD ONC ARIA COURSE ID: NORMAL
RAD ONC ARIA COURSE INTENT: NORMAL
RAD ONC ARIA COURSE LAST TREATMENT DATE: NORMAL
RAD ONC ARIA COURSE START DATE: NORMAL
RAD ONC ARIA COURSE TREATMENT ELAPSED DAYS: 30
RAD ONC ARIA FIRST TREATMENT DATE: NORMAL
RAD ONC ARIA PLAN FRACTIONS TREATED TO DATE: 21
RAD ONC ARIA PLAN ID: NORMAL
RAD ONC ARIA PLAN PRESCRIBED DOSE PER FRACTION: 2 GY
RAD ONC ARIA PLAN PRIMARY REFERENCE POINT: NORMAL
RAD ONC ARIA PLAN TOTAL FRACTIONS PRESCRIBED: 27
RAD ONC ARIA PLAN TOTAL PRESCRIBED DOSE: 5400 CGY
RAD ONC ARIA REFERENCE POINT DOSAGE GIVEN TO DATE: 42 GY
RAD ONC ARIA REFERENCE POINT ID: NORMAL
RAD ONC ARIA REFERENCE POINT SESSION DOSAGE GIVEN: 2 GY

## 2025-01-29 PROCEDURE — 77386: CPT | Performed by: RADIOLOGY

## 2025-01-29 NOTE — PROGRESS NOTES
Chief Complaint  Stage 1 mild COPD by GOLD classification and Left upper lobe pulmonary nodule    Subjective    History of Present Illness {CC  Problem List  Visit Diagnosis   Encounters  Notes  Medications  Labs  Result Review Imaging  Media     Deana Stack presents to [facility] for:    History of Present Illness  Ms. Stack is here for follow up and management of copd.  The [employer] standpoint.  She denies any pulmonary complaints today.  She is not needing any inhalers. She underwent Ion robotic bronchoscopy with Dr. Dewitt around 10- for left upper lobe pulmonary nodule.  Cytology was positive for adenocarcinoma.  Mediastinal nodes negative.  She was referred to CTS. There was concern for possible chest wall invasion after an MRI was completed. She was then referred for radiation and medical oncology evaluation.  She is following with Dr. Collazo and Dr. Tello.  She completes her last chemo treatment tomorrow.  She will then follow back up with CT surgery in regards to reevaluating that option.       Prior to Admission medications    Medication Sig Start Date End Date Taking? Authorizing Provider   buPROPion SR (WELLBUTRIN SR) 150 MG 12 hr tablet Take 1 tablet by mouth Daily. 11/22/24   Zeus Sanchez MD   Cholecalciferol 10 MCG (400 UNIT) tablet Take 1 tablet by mouth Daily.    Zeus Sanchez MD   hydrOXYzine (ATARAX) 10 MG tablet Take 1 tablet by mouth 3 (Three) Times a Day As Needed for Anxiety.    Zeus Sanchez MD   lidocaine-prilocaine (EMLA) 2.5-2.5 % cream Aprox 30 minutes prior to access apply generous amount of Emla Cream to port site and cover with clear plastic wrap until ready for use 1/15/25   Mitchel Tello MD   mirtazapine (REMERON SOL-TAB) 15 MG disintegrating tablet Place 1 tablet on the tongue Every Night.    Zeus Sanchez MD   multivitamin with minerals tablet tablet Take 1 tablet by mouth  "Daily.    Zeus Sanchez MD   ondansetron (ZOFRAN) 8 MG tablet Take 1 tablet by mouth Every 8 (Eight) Hours As Needed for Nausea or Vomiting. 1/2/25   Mitchel Tello MD   oxyCODONE (ROXICODONE) 5 MG immediate release tablet Take 1 tablet by mouth Every 6 (Six) Hours As Needed for Moderate Pain. 1/14/25   Osvaldo Lemus MD   prochlorperazine (COMPAZINE) 10 MG tablet Take 1 tablet by mouth Every 6 (Six) Hours As Needed for Nausea or Vomiting. 1/15/25   Mitchel Tello MD   vitamin C (ASCORBIC ACID) 250 MG tablet Take 1 tablet by mouth Daily.    Zeus Sanchez MD   zinc sulfate (ZINCATE) 220 (50 Zn) MG capsule Take 1 capsule by mouth Daily.    ProviderZeus MD       Social History     Socioeconomic History    Marital status:    Tobacco Use    Smoking status: Every Day     Current packs/day: 1.00     Average packs/day: 1 pack/day for 48.1 years (48.1 ttl pk-yrs)     Types: Cigarettes     Start date: 1/1/1977     Passive exposure: Current    Smokeless tobacco: Never    Tobacco comments:     Smokes about 2 cigarettes a day 2/5/25   Vaping Use    Vaping status: Never Used   Substance and Sexual Activity    Alcohol use: Never    Drug use: Never    Sexual activity: Not Currently     Partners: Male     Birth control/protection: Hysterectomy       Objective   Vital Signs:   /80   Pulse 102   Ht 157.5 cm (62\")   Wt 63.3 kg (139 lb 9.6 oz)   SpO2 98% Comment: RA  BMI 25.53 kg/m²     Physical Exam  Constitutional:       General: She is not in acute distress.  HENT:      Head: Normocephalic.      Nose: Nose normal.      Mouth/Throat:      Mouth: Mucous membranes are moist.   Eyes:      General: No scleral icterus.  Cardiovascular:      Rate and Rhythm: Normal rate.   Pulmonary:      Effort: No respiratory distress.   Abdominal:      General: There is no distension.   Neurological:      Mental Status: She is alert and oriented to person, place, and time.   Psychiatric:         " Mood and Affect: Mood normal.         Behavior: Behavior is cooperative.        Result Review :{ Labs  Result Review  Imaging  Med Tab  Media :    PFT Values          2024    13:30   Pre Drug PFT Results      FEV1 87   FEF 25-75% 46   FEV1/FVC 66.44   Other Tests PFT Results         DLCO 91   D/VAsb 71         Results for orders placed in visit on 24    Spirometry with Diffusion Capacity & Lung Volumes    Narrative  Spirometry with Diffusion Capacity & Lung Volumes    Performed by: Christiano Kumar CMA  Authorized by: Marilyn Reyes, JAJA  Pre Drug % Predicted  FVC: 106%  FEV1: 87%  FEF 25-75%: 46%  FEV1/FVC: 66.44%  T%  RV: 191%  DLCO: 91%  D/VAsb: 71%    Interpretation  Spirometry  Spirometry shows mild obstruction. There is reduced midflow suggesting small airway/airflow obstruction.  Review of FVL curve  Patient's effort is normal.  Lung Volume Measurements  Measurements show elevated residual volume consistent with gas trapping and elevated TLC consistent with hyperventilation.  Diffusion Capacity  The patient's diffusion capacity is normal.  Diffusion capacity is normal when corrected for alveolar volume.    Rest/Exercise Pulse Ox Values          10/14/2024    09:15   Rest/Exercise Pulse Ox Results   Rest room air SAT % 98   Exercise room air SAT % 97              Deana Stack  reports that she has been smoking cigarettes. She started smoking about 48 years ago. She has a 48.1 pack-year smoking history. She has been exposed to tobacco smoke. She has never used smokeless tobacco.            Alpha 1 M/M      Assessment and Plan {CC Problem List  Visit Diagnosis  ROS  Review (Popup)  Health Maintenance  Quality  BestPractice  Medications  SmartSets  SnapShot Encounters  Media      Diagnoses and all orders for this visit:    1. Stage 1 mild COPD by GOLD classification (Primary)  Comments:  No indication for inhaler need at this time.    2. Centrilobular  emphysema  Comments:  Normal AAT as listed above.    3. 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  Comments:  Following with medical oncology and radiation oncology.  Finishing chemo tomorrow and will follow back up with CT surgery.  Oncology ordering surveillance scans    4. Current every day smoker  Comments:  Has cut back to smoking 2 cigarettes a day.  Cessation recommended.            Plan as above.  Office follow-up in 6 months or sooner if needed.    Marilyn Reyes, APRN  2/5/2025  09:21 CST    Follow Up {Instructions Charge Capture  Follow-up Communications   Return in about 6 months (around 8/5/2025).    Patient was given instructions and counseling regarding her condition or for health maintenance advice. Please see specific information pulled into the AVS if appropriate.

## 2025-01-30 ENCOUNTER — INFUSION (OUTPATIENT)
Dept: ONCOLOGY | Facility: HOSPITAL | Age: 65
End: 2025-01-30
Payer: MEDICARE

## 2025-01-30 ENCOUNTER — LAB (OUTPATIENT)
Dept: LAB | Facility: HOSPITAL | Age: 65
End: 2025-01-30
Payer: MEDICARE

## 2025-01-30 ENCOUNTER — HOSPITAL ENCOUNTER (OUTPATIENT)
Dept: RADIATION ONCOLOGY | Facility: HOSPITAL | Age: 65
Discharge: HOME | End: 2025-01-30

## 2025-01-30 VITALS
BODY MASS INDEX: 25.8 KG/M2 | DIASTOLIC BLOOD PRESSURE: 66 MMHG | RESPIRATION RATE: 16 BRPM | HEART RATE: 90 BPM | OXYGEN SATURATION: 98 % | HEIGHT: 62 IN | TEMPERATURE: 97.6 F | WEIGHT: 140.2 LBS | SYSTOLIC BLOOD PRESSURE: 120 MMHG

## 2025-01-30 DIAGNOSIS — C34.12 MALIGNANT NEOPLASM OF UPPER LOBE OF LEFT LUNG: Primary | ICD-10-CM

## 2025-01-30 DIAGNOSIS — Z45.2 ENCOUNTER FOR CARE RELATED TO PORT-A-CATH: ICD-10-CM

## 2025-01-30 DIAGNOSIS — C34.12 MALIGNANT NEOPLASM OF UPPER LOBE OF LEFT LUNG: ICD-10-CM

## 2025-01-30 LAB
ALBUMIN SERPL-MCNC: 4.2 G/DL (ref 3.5–5.2)
ALBUMIN/GLOB SERPL: 1.8 G/DL
ALP SERPL-CCNC: 74 U/L (ref 39–117)
ALT SERPL W P-5'-P-CCNC: 27 U/L (ref 1–33)
ANION GAP SERPL CALCULATED.3IONS-SCNC: 8 MMOL/L (ref 5–15)
AST SERPL-CCNC: 18 U/L (ref 1–32)
BASOPHILS # BLD AUTO: 0.01 10*3/MM3 (ref 0–0.2)
BASOPHILS NFR BLD AUTO: 0.2 % (ref 0–1.5)
BILIRUB SERPL-MCNC: 0.2 MG/DL (ref 0–1.2)
BUN SERPL-MCNC: 10 MG/DL (ref 8–23)
BUN/CREAT SERPL: 21.3 (ref 7–25)
CALCIUM SPEC-SCNC: 9.1 MG/DL (ref 8.6–10.5)
CHLORIDE SERPL-SCNC: 102 MMOL/L (ref 98–107)
CO2 SERPL-SCNC: 28 MMOL/L (ref 22–29)
CREAT SERPL-MCNC: 0.47 MG/DL (ref 0.57–1)
DEPRECATED RDW RBC AUTO: 42.5 FL (ref 37–54)
EGFRCR SERPLBLD CKD-EPI 2021: 105.8 ML/MIN/1.73
EOSINOPHIL # BLD AUTO: 0.05 10*3/MM3 (ref 0–0.4)
EOSINOPHIL NFR BLD AUTO: 1.2 % (ref 0.3–6.2)
ERYTHROCYTE [DISTWIDTH] IN BLOOD BY AUTOMATED COUNT: 13.1 % (ref 12.3–15.4)
GLOBULIN UR ELPH-MCNC: 2.4 GM/DL
GLUCOSE SERPL-MCNC: 106 MG/DL (ref 65–99)
HCT VFR BLD AUTO: 38.5 % (ref 34–46.6)
HGB BLD-MCNC: 12.3 G/DL (ref 12–15.9)
IMM GRANULOCYTES # BLD AUTO: 0.02 10*3/MM3 (ref 0–0.05)
IMM GRANULOCYTES NFR BLD AUTO: 0.5 % (ref 0–0.5)
LYMPHOCYTES # BLD AUTO: 0.79 10*3/MM3 (ref 0.7–3.1)
LYMPHOCYTES NFR BLD AUTO: 19.5 % (ref 19.6–45.3)
MCH RBC QN AUTO: 28.9 PG (ref 26.6–33)
MCHC RBC AUTO-ENTMCNC: 31.9 G/DL (ref 31.5–35.7)
MCV RBC AUTO: 90.6 FL (ref 79–97)
MONOCYTES # BLD AUTO: 0.38 10*3/MM3 (ref 0.1–0.9)
MONOCYTES NFR BLD AUTO: 9.4 % (ref 5–12)
NEUTROPHILS NFR BLD AUTO: 2.8 10*3/MM3 (ref 1.7–7)
NEUTROPHILS NFR BLD AUTO: 69.2 % (ref 42.7–76)
NRBC BLD AUTO-RTO: 0 /100 WBC (ref 0–0.2)
PLATELET # BLD AUTO: 197 10*3/MM3 (ref 140–450)
PMV BLD AUTO: 9.4 FL (ref 6–12)
POTASSIUM SERPL-SCNC: 4.6 MMOL/L (ref 3.5–5.2)
PROT SERPL-MCNC: 6.6 G/DL (ref 6–8.5)
RAD ONC ARIA COURSE ID: NORMAL
RAD ONC ARIA COURSE INTENT: NORMAL
RAD ONC ARIA COURSE LAST TREATMENT DATE: NORMAL
RAD ONC ARIA COURSE START DATE: NORMAL
RAD ONC ARIA COURSE TREATMENT ELAPSED DAYS: 31
RAD ONC ARIA FIRST TREATMENT DATE: NORMAL
RAD ONC ARIA PLAN FRACTIONS TREATED TO DATE: 22
RAD ONC ARIA PLAN ID: NORMAL
RAD ONC ARIA PLAN PRESCRIBED DOSE PER FRACTION: 2 GY
RAD ONC ARIA PLAN PRIMARY REFERENCE POINT: NORMAL
RAD ONC ARIA PLAN TOTAL FRACTIONS PRESCRIBED: 27
RAD ONC ARIA PLAN TOTAL PRESCRIBED DOSE: 5400 CGY
RAD ONC ARIA REFERENCE POINT DOSAGE GIVEN TO DATE: 44 GY
RAD ONC ARIA REFERENCE POINT ID: NORMAL
RAD ONC ARIA REFERENCE POINT SESSION DOSAGE GIVEN: 2 GY
RBC # BLD AUTO: 4.25 10*6/MM3 (ref 3.77–5.28)
SODIUM SERPL-SCNC: 138 MMOL/L (ref 136–145)
WBC NRBC COR # BLD AUTO: 4.05 10*3/MM3 (ref 3.4–10.8)

## 2025-01-30 PROCEDURE — 25810000003 SODIUM CHLORIDE 0.9 % SOLUTION 250 ML FLEX CONT: Performed by: INTERNAL MEDICINE

## 2025-01-30 PROCEDURE — 80053 COMPREHEN METABOLIC PANEL: CPT

## 2025-01-30 PROCEDURE — 96375 TX/PRO/DX INJ NEW DRUG ADDON: CPT

## 2025-01-30 PROCEDURE — 96417 CHEMO IV INFUS EACH ADDL SEQ: CPT

## 2025-01-30 PROCEDURE — 25010000002 PALONOSETRON PER 25 MCG: Performed by: INTERNAL MEDICINE

## 2025-01-30 PROCEDURE — 25010000002 HEPARIN LOCK FLUSH PER 10 UNITS: Performed by: INTERNAL MEDICINE

## 2025-01-30 PROCEDURE — 36415 COLL VENOUS BLD VENIPUNCTURE: CPT

## 2025-01-30 PROCEDURE — 96413 CHEMO IV INFUSION 1 HR: CPT

## 2025-01-30 PROCEDURE — 25010000002 PACLITAXEL PER 1 MG: Performed by: INTERNAL MEDICINE

## 2025-01-30 PROCEDURE — 25810000003 SODIUM CHLORIDE 0.9 % SOLUTION: Performed by: INTERNAL MEDICINE

## 2025-01-30 PROCEDURE — 63710000001 APREPITANT PER 5 MG: Performed by: INTERNAL MEDICINE

## 2025-01-30 PROCEDURE — 25010000002 CARBOPLATIN PER 50 MG: Performed by: INTERNAL MEDICINE

## 2025-01-30 PROCEDURE — 77386: CPT | Performed by: RADIOLOGY

## 2025-01-30 PROCEDURE — A9270 NON-COVERED ITEM OR SERVICE: HCPCS | Performed by: INTERNAL MEDICINE

## 2025-01-30 PROCEDURE — 85025 COMPLETE CBC W/AUTO DIFF WBC: CPT

## 2025-01-30 PROCEDURE — 25010000002 DIPHENHYDRAMINE PER 50 MG: Performed by: INTERNAL MEDICINE

## 2025-01-30 PROCEDURE — 25010000002 DEXAMETHASONE PER 1 MG: Performed by: INTERNAL MEDICINE

## 2025-01-30 RX ORDER — SODIUM CHLORIDE 0.9 % (FLUSH) 0.9 %
10 SYRINGE (ML) INJECTION AS NEEDED
Status: DISCONTINUED | OUTPATIENT
Start: 2025-01-30 | End: 2025-01-30 | Stop reason: HOSPADM

## 2025-01-30 RX ORDER — DEXAMETHASONE SODIUM PHOSPHATE 10 MG/ML
10 INJECTION INTRAMUSCULAR; INTRAVENOUS ONCE
Status: COMPLETED | OUTPATIENT
Start: 2025-01-30 | End: 2025-01-30

## 2025-01-30 RX ORDER — FAMOTIDINE 10 MG/ML
20 INJECTION, SOLUTION INTRAVENOUS AS NEEDED
Status: DISCONTINUED | OUTPATIENT
Start: 2025-01-30 | End: 2025-01-30 | Stop reason: HOSPADM

## 2025-01-30 RX ORDER — SODIUM CHLORIDE 9 MG/ML
20 INJECTION, SOLUTION INTRAVENOUS ONCE
Status: COMPLETED | OUTPATIENT
Start: 2025-01-30 | End: 2025-01-30

## 2025-01-30 RX ORDER — HYDROCORTISONE SODIUM SUCCINATE 100 MG/2ML
100 INJECTION INTRAMUSCULAR; INTRAVENOUS AS NEEDED
Status: DISCONTINUED | OUTPATIENT
Start: 2025-01-30 | End: 2025-01-30 | Stop reason: HOSPADM

## 2025-01-30 RX ORDER — PALONOSETRON 0.05 MG/ML
0.25 INJECTION, SOLUTION INTRAVENOUS ONCE
Status: COMPLETED | OUTPATIENT
Start: 2025-01-30 | End: 2025-01-30

## 2025-01-30 RX ORDER — SODIUM CHLORIDE 0.9 % (FLUSH) 0.9 %
10 SYRINGE (ML) INJECTION AS NEEDED
OUTPATIENT
Start: 2025-01-30

## 2025-01-30 RX ORDER — DIPHENHYDRAMINE HYDROCHLORIDE 50 MG/ML
50 INJECTION INTRAMUSCULAR; INTRAVENOUS ONCE
Status: COMPLETED | OUTPATIENT
Start: 2025-01-30 | End: 2025-01-30

## 2025-01-30 RX ORDER — DIPHENHYDRAMINE HYDROCHLORIDE 50 MG/ML
50 INJECTION INTRAMUSCULAR; INTRAVENOUS AS NEEDED
Status: DISCONTINUED | OUTPATIENT
Start: 2025-01-30 | End: 2025-01-30 | Stop reason: HOSPADM

## 2025-01-30 RX ORDER — HEPARIN SODIUM (PORCINE) LOCK FLUSH IV SOLN 100 UNIT/ML 100 UNIT/ML
500 SOLUTION INTRAVENOUS AS NEEDED
Status: DISCONTINUED | OUTPATIENT
Start: 2025-01-30 | End: 2025-01-30 | Stop reason: HOSPADM

## 2025-01-30 RX ORDER — APREPITANT 80 MG/1
80 CAPSULE ORAL ONCE
Status: CANCELLED
Start: 2025-01-30 | End: 2025-01-30

## 2025-01-30 RX ORDER — FAMOTIDINE 10 MG/ML
20 INJECTION, SOLUTION INTRAVENOUS ONCE
Status: COMPLETED | OUTPATIENT
Start: 2025-01-30 | End: 2025-01-30

## 2025-01-30 RX ORDER — HEPARIN SODIUM (PORCINE) LOCK FLUSH IV SOLN 100 UNIT/ML 100 UNIT/ML
500 SOLUTION INTRAVENOUS AS NEEDED
OUTPATIENT
Start: 2025-01-30

## 2025-01-30 RX ORDER — APREPITANT 40 MG/1
80 CAPSULE ORAL ONCE
Status: COMPLETED | OUTPATIENT
Start: 2025-01-30 | End: 2025-01-30

## 2025-01-30 RX ADMIN — Medication 500 UNITS: at 12:46

## 2025-01-30 RX ADMIN — PACLITAXEL 70 MG: 6 INJECTION, SOLUTION INTRAVENOUS at 10:51

## 2025-01-30 RX ADMIN — SODIUM CHLORIDE 20 ML/HR: 9 INJECTION, SOLUTION INTRAVENOUS at 10:14

## 2025-01-30 RX ADMIN — CARBOPLATIN 290 MG: 10 INJECTION, SOLUTION INTRAVENOUS at 12:03

## 2025-01-30 RX ADMIN — DEXAMETHASONE SODIUM PHOSPHATE 10 MG: 10 INJECTION INTRAMUSCULAR; INTRAVENOUS at 10:20

## 2025-01-30 RX ADMIN — PALONOSETRON HYDROCHLORIDE 0.25 MG: 0.25 INJECTION, SOLUTION INTRAVENOUS at 10:24

## 2025-01-30 RX ADMIN — APREPITANT 80 MG: 40 CAPSULE ORAL at 09:47

## 2025-01-30 RX ADMIN — Medication 10 ML: at 12:46

## 2025-01-30 RX ADMIN — FAMOTIDINE 20 MG: 10 INJECTION INTRAVENOUS at 10:14

## 2025-01-30 RX ADMIN — DIPHENHYDRAMINE HYDROCHLORIDE 50 MG: 50 INJECTION, SOLUTION INTRAMUSCULAR; INTRAVENOUS at 10:17

## 2025-01-31 ENCOUNTER — HOSPITAL ENCOUNTER (OUTPATIENT)
Dept: RADIATION ONCOLOGY | Facility: HOSPITAL | Age: 65
Discharge: HOME | End: 2025-01-31

## 2025-01-31 ENCOUNTER — HOSPITAL ENCOUNTER (OUTPATIENT)
Dept: MRI IMAGING | Facility: HOSPITAL | Age: 65
Discharge: HOME | End: 2025-01-31
Payer: MEDICARE

## 2025-01-31 DIAGNOSIS — C34.12 MALIGNANT NEOPLASM OF UPPER LOBE OF LEFT LUNG: ICD-10-CM

## 2025-01-31 LAB
RAD ONC ARIA COURSE ID: NORMAL
RAD ONC ARIA COURSE INTENT: NORMAL
RAD ONC ARIA COURSE LAST TREATMENT DATE: NORMAL
RAD ONC ARIA COURSE START DATE: NORMAL
RAD ONC ARIA COURSE TREATMENT ELAPSED DAYS: 32
RAD ONC ARIA FIRST TREATMENT DATE: NORMAL
RAD ONC ARIA PLAN FRACTIONS TREATED TO DATE: 23
RAD ONC ARIA PLAN ID: NORMAL
RAD ONC ARIA PLAN PRESCRIBED DOSE PER FRACTION: 2 GY
RAD ONC ARIA PLAN PRIMARY REFERENCE POINT: NORMAL
RAD ONC ARIA PLAN TOTAL FRACTIONS PRESCRIBED: 27
RAD ONC ARIA PLAN TOTAL PRESCRIBED DOSE: 5400 CGY
RAD ONC ARIA REFERENCE POINT DOSAGE GIVEN TO DATE: 46 GY
RAD ONC ARIA REFERENCE POINT ID: NORMAL
RAD ONC ARIA REFERENCE POINT SESSION DOSAGE GIVEN: 2 GY

## 2025-01-31 PROCEDURE — 70553 MRI BRAIN STEM W/O & W/DYE: CPT

## 2025-01-31 PROCEDURE — 25510000001 GADOPICLENOL 0.5 MMOL/ML SOLUTION: Performed by: INTERNAL MEDICINE

## 2025-01-31 PROCEDURE — A9579 GAD-BASE MR CONTRAST NOS,1ML: HCPCS | Performed by: INTERNAL MEDICINE

## 2025-01-31 PROCEDURE — 77386: CPT | Performed by: RADIOLOGY

## 2025-01-31 RX ADMIN — GADOPICLENOL 6 ML: 485.1 INJECTION INTRAVENOUS at 08:55

## 2025-02-03 ENCOUNTER — HOSPITAL ENCOUNTER (OUTPATIENT)
Dept: RADIATION ONCOLOGY | Facility: HOSPITAL | Age: 65
Discharge: HOME | End: 2025-02-03
Payer: MEDICARE

## 2025-02-03 ENCOUNTER — HOSPITAL ENCOUNTER (OUTPATIENT)
Dept: RADIATION ONCOLOGY | Facility: HOSPITAL | Age: 65
Setting detail: RADIATION/ONCOLOGY SERIES
End: 2025-02-03
Payer: MEDICARE

## 2025-02-03 LAB
RAD ONC ARIA COURSE ID: NORMAL
RAD ONC ARIA COURSE INTENT: NORMAL
RAD ONC ARIA COURSE LAST TREATMENT DATE: NORMAL
RAD ONC ARIA COURSE START DATE: NORMAL
RAD ONC ARIA COURSE TREATMENT ELAPSED DAYS: 35
RAD ONC ARIA FIRST TREATMENT DATE: NORMAL
RAD ONC ARIA PLAN FRACTIONS TREATED TO DATE: 24
RAD ONC ARIA PLAN ID: NORMAL
RAD ONC ARIA PLAN PRESCRIBED DOSE PER FRACTION: 2 GY
RAD ONC ARIA PLAN PRIMARY REFERENCE POINT: NORMAL
RAD ONC ARIA PLAN TOTAL FRACTIONS PRESCRIBED: 27
RAD ONC ARIA PLAN TOTAL PRESCRIBED DOSE: 5400 CGY
RAD ONC ARIA REFERENCE POINT DOSAGE GIVEN TO DATE: 48 GY
RAD ONC ARIA REFERENCE POINT ID: NORMAL
RAD ONC ARIA REFERENCE POINT SESSION DOSAGE GIVEN: 2 GY

## 2025-02-03 PROCEDURE — 77386: CPT | Performed by: RADIOLOGY

## 2025-02-03 PROCEDURE — 77014 CHG CT GUIDANCE RADIATION THERAPY FLDS PLACEMENT: CPT | Performed by: RADIOLOGY

## 2025-02-04 ENCOUNTER — HOSPITAL ENCOUNTER (OUTPATIENT)
Dept: RADIATION ONCOLOGY | Facility: HOSPITAL | Age: 65
Discharge: HOME | End: 2025-02-04

## 2025-02-04 LAB
RAD ONC ARIA COURSE ID: NORMAL
RAD ONC ARIA COURSE INTENT: NORMAL
RAD ONC ARIA COURSE LAST TREATMENT DATE: NORMAL
RAD ONC ARIA COURSE START DATE: NORMAL
RAD ONC ARIA COURSE TREATMENT ELAPSED DAYS: 36
RAD ONC ARIA FIRST TREATMENT DATE: NORMAL
RAD ONC ARIA PLAN FRACTIONS TREATED TO DATE: 25
RAD ONC ARIA PLAN ID: NORMAL
RAD ONC ARIA PLAN PRESCRIBED DOSE PER FRACTION: 2 GY
RAD ONC ARIA PLAN PRIMARY REFERENCE POINT: NORMAL
RAD ONC ARIA PLAN TOTAL FRACTIONS PRESCRIBED: 27
RAD ONC ARIA PLAN TOTAL PRESCRIBED DOSE: 5400 CGY
RAD ONC ARIA REFERENCE POINT DOSAGE GIVEN TO DATE: 50 GY
RAD ONC ARIA REFERENCE POINT ID: NORMAL
RAD ONC ARIA REFERENCE POINT SESSION DOSAGE GIVEN: 2 GY

## 2025-02-04 PROCEDURE — 77336 RADIATION PHYSICS CONSULT: CPT | Performed by: RADIOLOGY

## 2025-02-04 PROCEDURE — 77386: CPT | Performed by: RADIOLOGY

## 2025-02-04 PROCEDURE — 77014 CHG CT GUIDANCE RADIATION THERAPY FLDS PLACEMENT: CPT | Performed by: RADIOLOGY

## 2025-02-05 ENCOUNTER — OFFICE VISIT (OUTPATIENT)
Dept: PULMONOLOGY | Facility: CLINIC | Age: 65
End: 2025-02-05
Payer: MEDICARE

## 2025-02-05 ENCOUNTER — HOSPITAL ENCOUNTER (OUTPATIENT)
Dept: RADIATION ONCOLOGY | Facility: HOSPITAL | Age: 65
Discharge: HOME | End: 2025-02-05

## 2025-02-05 VITALS
BODY MASS INDEX: 25.69 KG/M2 | OXYGEN SATURATION: 98 % | DIASTOLIC BLOOD PRESSURE: 80 MMHG | HEIGHT: 62 IN | SYSTOLIC BLOOD PRESSURE: 126 MMHG | HEART RATE: 102 BPM | WEIGHT: 139.6 LBS

## 2025-02-05 DIAGNOSIS — J44.9 STAGE 1 MILD COPD BY GOLD CLASSIFICATION: Primary | Chronic | ICD-10-CM

## 2025-02-05 DIAGNOSIS — F17.200 CURRENT EVERY DAY SMOKER: Chronic | ICD-10-CM

## 2025-02-05 DIAGNOSIS — J43.2 CENTRILOBULAR EMPHYSEMA: Chronic | ICD-10-CM

## 2025-02-05 DIAGNOSIS — C34.12 ADENOCARCINOMA OF UPPER LOBE OF LEFT LUNG: Chronic | ICD-10-CM

## 2025-02-05 LAB
RAD ONC ARIA COURSE ID: NORMAL
RAD ONC ARIA COURSE INTENT: NORMAL
RAD ONC ARIA COURSE LAST TREATMENT DATE: NORMAL
RAD ONC ARIA COURSE START DATE: NORMAL
RAD ONC ARIA COURSE TREATMENT ELAPSED DAYS: 37
RAD ONC ARIA FIRST TREATMENT DATE: NORMAL
RAD ONC ARIA PLAN FRACTIONS TREATED TO DATE: 26
RAD ONC ARIA PLAN ID: NORMAL
RAD ONC ARIA PLAN PRESCRIBED DOSE PER FRACTION: 2 GY
RAD ONC ARIA PLAN PRIMARY REFERENCE POINT: NORMAL
RAD ONC ARIA PLAN TOTAL FRACTIONS PRESCRIBED: 27
RAD ONC ARIA PLAN TOTAL PRESCRIBED DOSE: 5400 CGY
RAD ONC ARIA REFERENCE POINT DOSAGE GIVEN TO DATE: 52 GY
RAD ONC ARIA REFERENCE POINT ID: NORMAL
RAD ONC ARIA REFERENCE POINT SESSION DOSAGE GIVEN: 2 GY

## 2025-02-05 PROCEDURE — 77386: CPT | Performed by: RADIOLOGY

## 2025-02-05 PROCEDURE — 77014 CHG CT GUIDANCE RADIATION THERAPY FLDS PLACEMENT: CPT | Performed by: RADIOLOGY

## 2025-02-06 ENCOUNTER — LAB (OUTPATIENT)
Dept: LAB | Facility: HOSPITAL | Age: 65
End: 2025-02-06
Payer: MEDICARE

## 2025-02-06 ENCOUNTER — HOSPITAL ENCOUNTER (OUTPATIENT)
Dept: RADIATION ONCOLOGY | Facility: HOSPITAL | Age: 65
Setting detail: RADIATION/ONCOLOGY SERIES
Discharge: HOME | End: 2025-02-06
Payer: MEDICARE

## 2025-02-06 ENCOUNTER — INFUSION (OUTPATIENT)
Dept: ONCOLOGY | Facility: HOSPITAL | Age: 65
End: 2025-02-06
Payer: MEDICARE

## 2025-02-06 VITALS
DIASTOLIC BLOOD PRESSURE: 67 MMHG | HEART RATE: 92 BPM | RESPIRATION RATE: 18 BRPM | TEMPERATURE: 97.2 F | SYSTOLIC BLOOD PRESSURE: 106 MMHG | BODY MASS INDEX: 25.76 KG/M2 | WEIGHT: 140 LBS | OXYGEN SATURATION: 97 % | HEIGHT: 62 IN

## 2025-02-06 DIAGNOSIS — C34.12 MALIGNANT NEOPLASM OF UPPER LOBE OF LEFT LUNG: Primary | ICD-10-CM

## 2025-02-06 DIAGNOSIS — Z45.2 ENCOUNTER FOR CARE RELATED TO PORT-A-CATH: ICD-10-CM

## 2025-02-06 DIAGNOSIS — C34.12 MALIGNANT NEOPLASM OF UPPER LOBE OF LEFT LUNG: ICD-10-CM

## 2025-02-06 LAB
ALBUMIN SERPL-MCNC: 4.3 G/DL (ref 3.5–5.2)
ALBUMIN/GLOB SERPL: 1.7 G/DL
ALP SERPL-CCNC: 83 U/L (ref 39–117)
ALT SERPL W P-5'-P-CCNC: 20 U/L (ref 1–33)
ANION GAP SERPL CALCULATED.3IONS-SCNC: 11 MMOL/L (ref 5–15)
AST SERPL-CCNC: 18 U/L (ref 1–32)
BASOPHILS # BLD AUTO: 0.01 10*3/MM3 (ref 0–0.2)
BASOPHILS NFR BLD AUTO: 0.3 % (ref 0–1.5)
BILIRUB SERPL-MCNC: 0.3 MG/DL (ref 0–1.2)
BUN SERPL-MCNC: 11 MG/DL (ref 8–23)
BUN/CREAT SERPL: 20 (ref 7–25)
CALCIUM SPEC-SCNC: 9.3 MG/DL (ref 8.6–10.5)
CHLORIDE SERPL-SCNC: 102 MMOL/L (ref 98–107)
CO2 SERPL-SCNC: 28 MMOL/L (ref 22–29)
CREAT SERPL-MCNC: 0.55 MG/DL (ref 0.57–1)
DEPRECATED RDW RBC AUTO: 42.1 FL (ref 37–54)
EGFRCR SERPLBLD CKD-EPI 2021: 101.9 ML/MIN/1.73
EOSINOPHIL # BLD AUTO: 0.03 10*3/MM3 (ref 0–0.4)
EOSINOPHIL NFR BLD AUTO: 0.8 % (ref 0.3–6.2)
ERYTHROCYTE [DISTWIDTH] IN BLOOD BY AUTOMATED COUNT: 13.1 % (ref 12.3–15.4)
GLOBULIN UR ELPH-MCNC: 2.5 GM/DL
GLUCOSE SERPL-MCNC: 91 MG/DL (ref 65–99)
HCT VFR BLD AUTO: 40.1 % (ref 34–46.6)
HGB BLD-MCNC: 12.9 G/DL (ref 12–15.9)
IMM GRANULOCYTES # BLD AUTO: 0.01 10*3/MM3 (ref 0–0.05)
IMM GRANULOCYTES NFR BLD AUTO: 0.3 % (ref 0–0.5)
LYMPHOCYTES # BLD AUTO: 0.78 10*3/MM3 (ref 0.7–3.1)
LYMPHOCYTES NFR BLD AUTO: 21.5 % (ref 19.6–45.3)
MCH RBC QN AUTO: 29 PG (ref 26.6–33)
MCHC RBC AUTO-ENTMCNC: 32.2 G/DL (ref 31.5–35.7)
MCV RBC AUTO: 90.1 FL (ref 79–97)
MONOCYTES # BLD AUTO: 0.37 10*3/MM3 (ref 0.1–0.9)
MONOCYTES NFR BLD AUTO: 10.2 % (ref 5–12)
NEUTROPHILS NFR BLD AUTO: 2.42 10*3/MM3 (ref 1.7–7)
NEUTROPHILS NFR BLD AUTO: 66.9 % (ref 42.7–76)
NRBC BLD AUTO-RTO: 0 /100 WBC (ref 0–0.2)
PLATELET # BLD AUTO: 178 10*3/MM3 (ref 140–450)
PMV BLD AUTO: 9.1 FL (ref 6–12)
POTASSIUM SERPL-SCNC: 4.8 MMOL/L (ref 3.5–5.2)
PROT SERPL-MCNC: 6.8 G/DL (ref 6–8.5)
RAD ONC ARIA COURSE ID: NORMAL
RAD ONC ARIA COURSE INTENT: NORMAL
RAD ONC ARIA COURSE LAST TREATMENT DATE: NORMAL
RAD ONC ARIA COURSE START DATE: NORMAL
RAD ONC ARIA COURSE TREATMENT ELAPSED DAYS: 38
RAD ONC ARIA FIRST TREATMENT DATE: NORMAL
RAD ONC ARIA PLAN FRACTIONS TREATED TO DATE: 27
RAD ONC ARIA PLAN ID: NORMAL
RAD ONC ARIA PLAN PRESCRIBED DOSE PER FRACTION: 2 GY
RAD ONC ARIA PLAN PRIMARY REFERENCE POINT: NORMAL
RAD ONC ARIA PLAN TOTAL FRACTIONS PRESCRIBED: 27
RAD ONC ARIA PLAN TOTAL PRESCRIBED DOSE: 5400 CGY
RAD ONC ARIA REFERENCE POINT DOSAGE GIVEN TO DATE: 54 GY
RAD ONC ARIA REFERENCE POINT ID: NORMAL
RAD ONC ARIA REFERENCE POINT SESSION DOSAGE GIVEN: 2 GY
RBC # BLD AUTO: 4.45 10*6/MM3 (ref 3.77–5.28)
SODIUM SERPL-SCNC: 141 MMOL/L (ref 136–145)
WBC NRBC COR # BLD AUTO: 3.62 10*3/MM3 (ref 3.4–10.8)

## 2025-02-06 PROCEDURE — 25010000002 PALONOSETRON PER 25 MCG: Performed by: INTERNAL MEDICINE

## 2025-02-06 PROCEDURE — 25010000002 PACLITAXEL PER 1 MG: Performed by: INTERNAL MEDICINE

## 2025-02-06 PROCEDURE — 25810000003 SODIUM CHLORIDE 0.9 % SOLUTION 250 ML FLEX CONT: Performed by: INTERNAL MEDICINE

## 2025-02-06 PROCEDURE — 25010000002 DEXAMETHASONE PER 1 MG: Performed by: INTERNAL MEDICINE

## 2025-02-06 PROCEDURE — 96417 CHEMO IV INFUS EACH ADDL SEQ: CPT

## 2025-02-06 PROCEDURE — 96375 TX/PRO/DX INJ NEW DRUG ADDON: CPT

## 2025-02-06 PROCEDURE — 25010000002 CARBOPLATIN PER 50 MG: Performed by: INTERNAL MEDICINE

## 2025-02-06 PROCEDURE — 25010000002 DIPHENHYDRAMINE PER 50 MG: Performed by: INTERNAL MEDICINE

## 2025-02-06 PROCEDURE — 77014 CHG CT GUIDANCE RADIATION THERAPY FLDS PLACEMENT: CPT | Performed by: RADIOLOGY

## 2025-02-06 PROCEDURE — 63710000001 APREPITANT PER 5 MG: Performed by: INTERNAL MEDICINE

## 2025-02-06 PROCEDURE — A9270 NON-COVERED ITEM OR SERVICE: HCPCS | Performed by: INTERNAL MEDICINE

## 2025-02-06 PROCEDURE — 96413 CHEMO IV INFUSION 1 HR: CPT

## 2025-02-06 PROCEDURE — 85025 COMPLETE CBC W/AUTO DIFF WBC: CPT

## 2025-02-06 PROCEDURE — 77386: CPT | Performed by: RADIOLOGY

## 2025-02-06 PROCEDURE — 80053 COMPREHEN METABOLIC PANEL: CPT

## 2025-02-06 PROCEDURE — 25810000003 SODIUM CHLORIDE 0.9 % SOLUTION: Performed by: INTERNAL MEDICINE

## 2025-02-06 PROCEDURE — 25010000002 HEPARIN LOCK FLUSH PER 10 UNITS: Performed by: INTERNAL MEDICINE

## 2025-02-06 PROCEDURE — 36415 COLL VENOUS BLD VENIPUNCTURE: CPT

## 2025-02-06 RX ORDER — HEPARIN SODIUM (PORCINE) LOCK FLUSH IV SOLN 100 UNIT/ML 100 UNIT/ML
500 SOLUTION INTRAVENOUS AS NEEDED
Status: DISCONTINUED | OUTPATIENT
Start: 2025-02-06 | End: 2025-02-06 | Stop reason: HOSPADM

## 2025-02-06 RX ORDER — FAMOTIDINE 10 MG/ML
20 INJECTION, SOLUTION INTRAVENOUS ONCE
Status: COMPLETED | OUTPATIENT
Start: 2025-02-06 | End: 2025-02-06

## 2025-02-06 RX ORDER — SODIUM CHLORIDE 9 MG/ML
20 INJECTION, SOLUTION INTRAVENOUS ONCE
Status: COMPLETED | OUTPATIENT
Start: 2025-02-06 | End: 2025-02-06

## 2025-02-06 RX ORDER — FAMOTIDINE 10 MG/ML
20 INJECTION, SOLUTION INTRAVENOUS AS NEEDED
Status: DISCONTINUED | OUTPATIENT
Start: 2025-02-06 | End: 2025-02-06 | Stop reason: HOSPADM

## 2025-02-06 RX ORDER — HYDROCORTISONE SODIUM SUCCINATE 100 MG/2ML
100 INJECTION INTRAMUSCULAR; INTRAVENOUS AS NEEDED
Status: DISCONTINUED | OUTPATIENT
Start: 2025-02-06 | End: 2025-02-06 | Stop reason: HOSPADM

## 2025-02-06 RX ORDER — APREPITANT 40 MG/1
80 CAPSULE ORAL ONCE
Status: COMPLETED | OUTPATIENT
Start: 2025-02-06 | End: 2025-02-06

## 2025-02-06 RX ORDER — SODIUM CHLORIDE 0.9 % (FLUSH) 0.9 %
10 SYRINGE (ML) INJECTION AS NEEDED
OUTPATIENT
Start: 2025-02-06

## 2025-02-06 RX ORDER — SODIUM CHLORIDE 0.9 % (FLUSH) 0.9 %
10 SYRINGE (ML) INJECTION AS NEEDED
Status: DISCONTINUED | OUTPATIENT
Start: 2025-02-06 | End: 2025-02-06 | Stop reason: HOSPADM

## 2025-02-06 RX ORDER — DEXAMETHASONE SODIUM PHOSPHATE 10 MG/ML
10 INJECTION INTRAMUSCULAR; INTRAVENOUS ONCE
Status: COMPLETED | OUTPATIENT
Start: 2025-02-06 | End: 2025-02-06

## 2025-02-06 RX ORDER — DIPHENHYDRAMINE HYDROCHLORIDE 50 MG/ML
50 INJECTION INTRAMUSCULAR; INTRAVENOUS ONCE
Status: COMPLETED | OUTPATIENT
Start: 2025-02-06 | End: 2025-02-06

## 2025-02-06 RX ORDER — PALONOSETRON 0.05 MG/ML
0.25 INJECTION, SOLUTION INTRAVENOUS ONCE
Status: COMPLETED | OUTPATIENT
Start: 2025-02-06 | End: 2025-02-06

## 2025-02-06 RX ORDER — DIPHENHYDRAMINE HYDROCHLORIDE 50 MG/ML
50 INJECTION INTRAMUSCULAR; INTRAVENOUS AS NEEDED
Status: DISCONTINUED | OUTPATIENT
Start: 2025-02-06 | End: 2025-02-06 | Stop reason: HOSPADM

## 2025-02-06 RX ORDER — HEPARIN SODIUM (PORCINE) LOCK FLUSH IV SOLN 100 UNIT/ML 100 UNIT/ML
500 SOLUTION INTRAVENOUS AS NEEDED
OUTPATIENT
Start: 2025-02-06

## 2025-02-06 RX ADMIN — APREPITANT 80 MG: 40 CAPSULE ORAL at 08:55

## 2025-02-06 RX ADMIN — PACLITAXEL 70 MG: 6 INJECTION, SOLUTION, CONCENTRATE INTRAVENOUS at 10:00

## 2025-02-06 RX ADMIN — PALONOSETRON HYDROCHLORIDE 0.25 MG: 0.25 INJECTION, SOLUTION INTRAVENOUS at 08:56

## 2025-02-06 RX ADMIN — SODIUM CHLORIDE 20 ML/HR: 9 INJECTION, SOLUTION INTRAVENOUS at 08:55

## 2025-02-06 RX ADMIN — DEXAMETHASONE SODIUM PHOSPHATE 10 MG: 10 INJECTION INTRAMUSCULAR; INTRAVENOUS at 08:59

## 2025-02-06 RX ADMIN — FAMOTIDINE 20 MG: 10 INJECTION INTRAVENOUS at 08:57

## 2025-02-06 RX ADMIN — DIPHENHYDRAMINE HYDROCHLORIDE 50 MG: 50 INJECTION, SOLUTION INTRAMUSCULAR; INTRAVENOUS at 09:03

## 2025-02-06 RX ADMIN — Medication 10 ML: at 12:00

## 2025-02-06 RX ADMIN — HEPARIN 500 UNITS: 100 SYRINGE at 12:00

## 2025-02-06 RX ADMIN — CARBOPLATIN 250 MG: 10 INJECTION, SOLUTION INTRAVENOUS at 11:22

## 2025-02-07 ENCOUNTER — HOSPITAL ENCOUNTER (OUTPATIENT)
Dept: RADIATION ONCOLOGY | Facility: HOSPITAL | Age: 65
Discharge: HOME | End: 2025-02-07

## 2025-02-07 LAB
RAD ONC ARIA COURSE END DATE: NORMAL
RAD ONC ARIA COURSE ID: NORMAL
RAD ONC ARIA COURSE INTENT: NORMAL
RAD ONC ARIA COURSE LAST TREATMENT DATE: NORMAL
RAD ONC ARIA COURSE START DATE: NORMAL
RAD ONC ARIA COURSE TREATMENT ELAPSED DAYS: 38
RAD ONC ARIA FIRST TREATMENT DATE: NORMAL
RAD ONC ARIA PLAN FRACTIONS TREATED TO DATE: 27
RAD ONC ARIA PLAN ID: NORMAL
RAD ONC ARIA PLAN NAME: NORMAL
RAD ONC ARIA PLAN PRESCRIBED DOSE PER FRACTION: 2 GY
RAD ONC ARIA PLAN PRIMARY REFERENCE POINT: NORMAL
RAD ONC ARIA PLAN TOTAL FRACTIONS PRESCRIBED: 27
RAD ONC ARIA PLAN TOTAL PRESCRIBED DOSE: 5400 CGY
RAD ONC ARIA REFERENCE POINT DOSAGE GIVEN TO DATE: 54 GY
RAD ONC ARIA REFERENCE POINT ID: NORMAL

## 2025-02-11 ENCOUNTER — DOCUMENTATION (OUTPATIENT)
Age: 65
End: 2025-02-11
Payer: MEDICARE

## 2025-02-20 ENCOUNTER — TELEPHONE (OUTPATIENT)
Dept: CARDIAC SURGERY | Facility: CLINIC | Age: 65
End: 2025-02-20
Payer: MEDICARE

## 2025-02-20 NOTE — TELEPHONE ENCOUNTER
Patient needs follow up with Dr. Beltre 4 weeks after patient has completed immunotherapy.  Please find out when her last dose is scheduled and plan appointment from there.  She will need PET and CT Chest without contrast prior to appointment

## 2025-02-27 DIAGNOSIS — C34.92 ADENOCARCINOMA, LUNG, LEFT: Primary | ICD-10-CM

## 2025-02-27 DIAGNOSIS — R91.8 OTHER NONSPECIFIC ABNORMAL FINDING OF LUNG FIELD: ICD-10-CM

## 2025-03-11 ENCOUNTER — HOSPITAL ENCOUNTER (OUTPATIENT)
Dept: CT IMAGING | Facility: HOSPITAL | Age: 65
Discharge: HOME | End: 2025-03-11
Payer: MEDICARE

## 2025-03-11 DIAGNOSIS — C34.92 ADENOCARCINOMA, LUNG, LEFT: ICD-10-CM

## 2025-03-11 DIAGNOSIS — R91.8 OTHER NONSPECIFIC ABNORMAL FINDING OF LUNG FIELD: ICD-10-CM

## 2025-03-11 PROCEDURE — A9552 F18 FDG: HCPCS | Performed by: NURSE PRACTITIONER

## 2025-03-11 PROCEDURE — 34310000005 FLUDEOXYGLUCOSE F18 SOLUTION: Performed by: NURSE PRACTITIONER

## 2025-03-11 PROCEDURE — 71250 CT THORAX DX C-: CPT

## 2025-03-11 PROCEDURE — 78815 PET IMAGE W/CT SKULL-THIGH: CPT

## 2025-03-11 RX ADMIN — FLUDEOXYGLUCOSE F18 1 DOSE: 300 INJECTION INTRAVENOUS at 08:01

## 2025-03-13 ENCOUNTER — PREP FOR SURGERY (OUTPATIENT)
Dept: OTHER | Facility: HOSPITAL | Age: 65
End: 2025-03-13
Payer: MEDICARE

## 2025-03-13 ENCOUNTER — OFFICE VISIT (OUTPATIENT)
Dept: CARDIAC SURGERY | Facility: CLINIC | Age: 65
End: 2025-03-13
Payer: MEDICARE

## 2025-03-13 ENCOUNTER — TELEPHONE (OUTPATIENT)
Dept: CARDIAC SURGERY | Facility: CLINIC | Age: 65
End: 2025-03-13
Payer: MEDICARE

## 2025-03-13 VITALS
DIASTOLIC BLOOD PRESSURE: 62 MMHG | SYSTOLIC BLOOD PRESSURE: 100 MMHG | WEIGHT: 140 LBS | HEART RATE: 85 BPM | BODY MASS INDEX: 25.76 KG/M2 | OXYGEN SATURATION: 96 % | HEIGHT: 62 IN

## 2025-03-13 DIAGNOSIS — R06.02 SOB (SHORTNESS OF BREATH): ICD-10-CM

## 2025-03-13 DIAGNOSIS — C34.12 ADENOCARCINOMA OF UPPER LOBE OF LEFT LUNG: Primary | ICD-10-CM

## 2025-03-13 DIAGNOSIS — C34.12 MALIGNANT NEOPLASM OF UPPER LOBE OF LEFT LUNG: Primary | ICD-10-CM

## 2025-03-13 PROCEDURE — 1159F MED LIST DOCD IN RCRD: CPT | Performed by: SURGERY

## 2025-03-13 PROCEDURE — 1160F RVW MEDS BY RX/DR IN RCRD: CPT | Performed by: SURGERY

## 2025-03-13 PROCEDURE — 99214 OFFICE O/P EST MOD 30 MIN: CPT | Performed by: SURGERY

## 2025-03-13 RX ORDER — SODIUM CHLORIDE 0.9 % (FLUSH) 0.9 %
10 SYRINGE (ML) INJECTION AS NEEDED
OUTPATIENT
Start: 2025-03-13

## 2025-03-13 RX ORDER — HEPARIN SODIUM 5000 [USP'U]/ML
5000 INJECTION, SOLUTION INTRAVENOUS; SUBCUTANEOUS ONCE
OUTPATIENT
Start: 2025-03-18

## 2025-03-13 RX ORDER — SODIUM CHLORIDE 9 MG/ML
40 INJECTION, SOLUTION INTRAVENOUS AS NEEDED
OUTPATIENT
Start: 2025-03-13

## 2025-03-13 RX ORDER — SODIUM CHLORIDE 0.9 % (FLUSH) 0.9 %
10 SYRINGE (ML) INJECTION EVERY 12 HOURS SCHEDULED
OUTPATIENT
Start: 2025-03-13

## 2025-03-13 NOTE — TELEPHONE ENCOUNTER
Surgery orders are in for 3/18/2025.  Patient should arrive at 0900 and case will be to follow the TAVR and mitraclip on that day.  Please call with prework information.   Confirm she does not take anticoagulation or Plavix.

## 2025-03-13 NOTE — LETTER
March 18, 2025     JAJA Daugherty  [address] [place] [address]    Patient: Deana Stack   YOB: 1960   Date of Visit: 3/13/2025       Dear JAJA Daugherty,    Deana Stack was in my office today. Below are the relevant portions of my assessment and plan of care.    Ms. Stack is a 64-year-old female she follows up with me after having neoadjuvant therapy for an adenocarcinoma left upper lobe that likely involves left chest wall.  She has had positive treatment response with much smaller appearing mass but it still does appear to abut and possibly involve the left anterior chest wall.  She continues to smoke but is trying hard to quit.  There is no evidence of metastatic disease on repeat imaging.    Discussed with the patient and her  today the natural history of lung cancer and patient such as her.  I would recommend surgical resection including chest wall resection to obtain adequate margin.  We discussed this occurring through open thoracotomy in order to allow adequate chest wall resection.  I discussed with her operative expectations of this as well as risk and benefits, she understands and wants to proceed. We discussed the risk and benefits of surgery and alternatives including but not limited to bleeding, infection, injury to major vessels or organs, need for transfusion, need for conversion to open operation, pneumonia, prolonged airleak, risk of anesthesia, and/or death.  She understands these risk and agrees to proceed.    I have reviewed her PFT she has adequate lung reserve to tolerate anatomic lobectomy.  We will plan on surgery next week.  Thank you for trusting me with the care of Ms. Stack.  Please not hesitate to call questions or concerns.         Sincerely,        Jaime Beltre MD        CC: Donovan Dewitt MD

## 2025-03-13 NOTE — TELEPHONE ENCOUNTER
Deana Stack is aware of prework date/time and surgery date 03/18/2025, arrival at 0900. Pt is aware nothing to eat or drink and no medications after midnight (unless approved by prework). She confirmed she is not taking anticoagulation/Plavix. Pt voiced understanding to all.

## 2025-03-14 ENCOUNTER — PRE-ADMISSION TESTING (OUTPATIENT)
Dept: PREADMISSION TESTING | Facility: HOSPITAL | Age: 65
End: 2025-03-14
Payer: MEDICARE

## 2025-03-14 ENCOUNTER — HOSPITAL ENCOUNTER (OUTPATIENT)
Dept: GENERAL RADIOLOGY | Facility: HOSPITAL | Age: 65
Discharge: HOME | End: 2025-03-14
Payer: MEDICARE

## 2025-03-14 VITALS
SYSTOLIC BLOOD PRESSURE: 128 MMHG | BODY MASS INDEX: 26.13 KG/M2 | RESPIRATION RATE: 20 BRPM | DIASTOLIC BLOOD PRESSURE: 64 MMHG | HEIGHT: 62 IN | WEIGHT: 141.98 LBS | HEART RATE: 93 BPM | OXYGEN SATURATION: 98 %

## 2025-03-14 DIAGNOSIS — R06.02 SOB (SHORTNESS OF BREATH): ICD-10-CM

## 2025-03-14 DIAGNOSIS — C34.12 ADENOCARCINOMA OF UPPER LOBE OF LEFT LUNG: ICD-10-CM

## 2025-03-14 LAB
ABO GROUP BLD: NORMAL
ALBUMIN SERPL-MCNC: 3.9 G/DL (ref 3.5–5.2)
ALBUMIN/GLOB SERPL: 1.6 G/DL
ALP SERPL-CCNC: 76 U/L (ref 39–117)
ALT SERPL W P-5'-P-CCNC: 32 U/L (ref 1–33)
ANION GAP SERPL CALCULATED.3IONS-SCNC: 10 MMOL/L (ref 5–15)
APTT PPP: 26.5 SECONDS (ref 24.5–36)
AST SERPL-CCNC: 31 U/L (ref 1–32)
BASOPHILS # BLD AUTO: 0.01 10*3/MM3 (ref 0–0.2)
BASOPHILS NFR BLD AUTO: 0.3 % (ref 0–1.5)
BILIRUB SERPL-MCNC: 0.2 MG/DL (ref 0–1.2)
BLD GP AB SCN SERPL QL: NEGATIVE
BUN SERPL-MCNC: 7 MG/DL (ref 8–23)
BUN/CREAT SERPL: 12.3 (ref 7–25)
CALCIUM SPEC-SCNC: 8.6 MG/DL (ref 8.6–10.5)
CHLORIDE SERPL-SCNC: 106 MMOL/L (ref 98–107)
CO2 SERPL-SCNC: 27 MMOL/L (ref 22–29)
CREAT SERPL-MCNC: 0.57 MG/DL (ref 0.57–1)
DEPRECATED RDW RBC AUTO: 47.7 FL (ref 37–54)
EGFRCR SERPLBLD CKD-EPI 2021: 101 ML/MIN/1.73
EOSINOPHIL # BLD AUTO: 0.08 10*3/MM3 (ref 0–0.4)
EOSINOPHIL NFR BLD AUTO: 2.3 % (ref 0.3–6.2)
ERYTHROCYTE [DISTWIDTH] IN BLOOD BY AUTOMATED COUNT: 14.9 % (ref 12.3–15.4)
GLOBULIN UR ELPH-MCNC: 2.4 GM/DL
GLUCOSE SERPL-MCNC: 80 MG/DL (ref 65–99)
HCT VFR BLD AUTO: 35.3 % (ref 34–46.6)
HGB BLD-MCNC: 12.1 G/DL (ref 12–15.9)
IMM GRANULOCYTES # BLD AUTO: 0.01 10*3/MM3 (ref 0–0.05)
IMM GRANULOCYTES NFR BLD AUTO: 0.3 % (ref 0–0.5)
INR PPP: 0.96 (ref 0.91–1.09)
LYMPHOCYTES # BLD AUTO: 1.05 10*3/MM3 (ref 0.7–3.1)
LYMPHOCYTES NFR BLD AUTO: 30 % (ref 19.6–45.3)
MCH RBC QN AUTO: 30.4 PG (ref 26.6–33)
MCHC RBC AUTO-ENTMCNC: 34.3 G/DL (ref 31.5–35.7)
MCV RBC AUTO: 88.7 FL (ref 79–97)
MONOCYTES # BLD AUTO: 0.45 10*3/MM3 (ref 0.1–0.9)
MONOCYTES NFR BLD AUTO: 12.9 % (ref 5–12)
NEUTROPHILS NFR BLD AUTO: 1.9 10*3/MM3 (ref 1.7–7)
NEUTROPHILS NFR BLD AUTO: 54.2 % (ref 42.7–76)
NRBC BLD AUTO-RTO: 0 /100 WBC (ref 0–0.2)
PLATELET # BLD AUTO: 236 10*3/MM3 (ref 140–450)
PMV BLD AUTO: 9 FL (ref 6–12)
POTASSIUM SERPL-SCNC: 3.2 MMOL/L (ref 3.5–5.2)
PROT SERPL-MCNC: 6.3 G/DL (ref 6–8.5)
PROTHROMBIN TIME: 13.3 SECONDS (ref 11.8–14.8)
RBC # BLD AUTO: 3.98 10*6/MM3 (ref 3.77–5.28)
RH BLD: POSITIVE
SODIUM SERPL-SCNC: 143 MMOL/L (ref 136–145)
T&S EXPIRATION DATE: NORMAL
WBC NRBC COR # BLD AUTO: 3.5 10*3/MM3 (ref 3.4–10.8)

## 2025-03-14 PROCEDURE — 93005 ELECTROCARDIOGRAM TRACING: CPT

## 2025-03-14 PROCEDURE — 80053 COMPREHEN METABOLIC PANEL: CPT

## 2025-03-14 PROCEDURE — 86900 BLOOD TYPING SEROLOGIC ABO: CPT

## 2025-03-14 PROCEDURE — 85730 THROMBOPLASTIN TIME PARTIAL: CPT

## 2025-03-14 PROCEDURE — 71046 X-RAY EXAM CHEST 2 VIEWS: CPT

## 2025-03-14 PROCEDURE — 86901 BLOOD TYPING SEROLOGIC RH(D): CPT

## 2025-03-14 PROCEDURE — 85025 COMPLETE CBC W/AUTO DIFF WBC: CPT

## 2025-03-14 PROCEDURE — 93010 ELECTROCARDIOGRAM REPORT: CPT | Performed by: STUDENT IN AN ORGANIZED HEALTH CARE EDUCATION/TRAINING PROGRAM

## 2025-03-14 PROCEDURE — 86850 RBC ANTIBODY SCREEN: CPT

## 2025-03-14 PROCEDURE — 85610 PROTHROMBIN TIME: CPT

## 2025-03-14 PROCEDURE — 36415 COLL VENOUS BLD VENIPUNCTURE: CPT

## 2025-03-14 NOTE — DISCHARGE INSTRUCTIONS
Preparing for Surgery  Follow these instructions before the procedure:  Several days or weeks before your procedure  Medication(s) you need to stop   _______ days/week prior to surgery: ***      Ask your health care provider about:  Changing or stopping your regular medicines. This is especially important if you are taking diabetes medicines or blood thinners.  Taking medicines such as aspirin and ibuprofen. These medicines can thin your blood. Do not take these medicines unless your health care provider tells you to take them.  Taking over-the-counter medicines, vitamins, herbs, and supplements.    Contact your surgeon if you:  Develop a fever of more than 100.4°F (38°C) or other feelings of illness during the 48 hours before your surgery.  Have symptoms that get worse.  Have questions or concerns about your surgery.  If you are going home the same day of your surgery you will need to arrange for a responsible adult, age 18 years old or older, to drive you home from the hospital and stay with you for 24 hours. Verification of the  will be made prior to any procedure requiring sedation. You may not go home in a taxi or any form of public transportation by yourself.     Day before your procedure  Medication(s) you need to stop the day before your surgery:***    24 hours before your procedure DO NOT drink alcoholic beverages or smoke.  24 hours before your procedure STOP taking Erectile Dysfunction medication (i.e.,Cialis, Viagra)   You may be asked to shower with a germ-killing soap.  Day of your procedure   You may take the following medication(s) the morning of surgery with a sip of water: oxycodone      8 hours before your scheduled arrival time, STOP all food, any dairy products, and full liquids. This includes hard candy, chewing gum or mints. This is extremely important to prevent serious complications.     Up to 2 hours before your scheduled arrival time, you may have clear liquids no cream, powder, or  pulp of any kind. Safe options are water, black coffee, plain tea, soda, Gatorade/Powerade, clear broth, apple juice.    2 hours before your scheduled arrival time, STOP drinking clear liquids.    You may need to take another shower with a germ-killing soap before you leave home in the morning. Do not use perfumes, colognes, or body lotions.  Wear comfortable loose-fitting clothing.  Remove all jewelry including body piercing and rings, dark colored nail polish, and make up prior to arrival at the hospital. Leave all valuables at home.   Bring your hearing aids if you rely on them.  Do not wear contact lenses. If you wear eyeglasses remember to bring a case to store them in while you are in surgery.  Do not use denture adhesives since you will be asked to remove them during your surgery.    You do not need to bring your home medications into the hospital.   Bring your sleep apnea device with you on the day of your surgery (if this applies to you).  If you have an Inspire implant for sleep apnea, please bring the remote with you on the day of surgery.  If you wear portable oxygen, bring it with you.   If you are staying overnight, you may bring a bag of items you may need such as slippers, robe and a change of clothes for your discharge. You may want to leave these items in the car until you are ready for them since your family will take your belongings when you leave the pre-operative area.  Arrive at the hospital as scheduled by the office. You will be asked to arrive 2 hours prior to your surgery time in order to prepare for your procedure.  When you arrive at the hospital  Go to the registration desk located at the main entrance of the hospital.  After registration is completed, you will be given a beeper and a sticker sheet. Take the stickers to Outpatient Surgery and place in the tray at the end of the desk to notify the staff that you have arrived and registered.   Return to the lobby to wait. You are not  always called back according to the time of arrival but rather the time your doctor will be ready.  When your beeper lights up and vibrates proceed through the double doors, under the stairs, and a member of the Outpatient Surgery staff will escort you to your preoperative room.

## 2025-03-15 LAB
QT INTERVAL: 356 MS
QTC INTERVAL: 418 MS

## 2025-03-18 ENCOUNTER — HOSPITAL ENCOUNTER (INPATIENT)
Facility: HOSPITAL | Age: 65
LOS: 3 days | Discharge: HOME | DRG: 165 | End: 2025-03-21
Attending: SURGERY | Admitting: SURGERY
Payer: MEDICARE

## 2025-03-18 ENCOUNTER — ANESTHESIA (OUTPATIENT)
Dept: PERIOP | Facility: HOSPITAL | Age: 65
End: 2025-03-18
Payer: MEDICARE

## 2025-03-18 ENCOUNTER — ANESTHESIA EVENT (OUTPATIENT)
Dept: PERIOP | Facility: HOSPITAL | Age: 65
End: 2025-03-18
Payer: MEDICARE

## 2025-03-18 ENCOUNTER — APPOINTMENT (OUTPATIENT)
Dept: GENERAL RADIOLOGY | Facility: HOSPITAL | Age: 65
DRG: 165 | End: 2025-03-18
Payer: MEDICARE

## 2025-03-18 DIAGNOSIS — C34.12 ADENOCARCINOMA OF UPPER LOBE OF LEFT LUNG: ICD-10-CM

## 2025-03-18 PROBLEM — C34.90 LUNG CANCER: Status: ACTIVE | Noted: 2025-03-18

## 2025-03-18 LAB
ABO GROUP BLD: NORMAL
ANION GAP SERPL CALCULATED.3IONS-SCNC: 12 MMOL/L (ref 5–15)
BLD GP AB SCN SERPL QL: NEGATIVE
BUN SERPL-MCNC: 11 MG/DL (ref 8–23)
BUN/CREAT SERPL: 18 (ref 7–25)
CALCIUM SPEC-SCNC: 8.2 MG/DL (ref 8.6–10.5)
CHLORIDE SERPL-SCNC: 105 MMOL/L (ref 98–107)
CO2 SERPL-SCNC: 23 MMOL/L (ref 22–29)
CREAT SERPL-MCNC: 0.61 MG/DL (ref 0.57–1)
DEPRECATED RDW RBC AUTO: 50 FL (ref 37–54)
EGFRCR SERPLBLD CKD-EPI 2021: 99.4 ML/MIN/1.73
ERYTHROCYTE [DISTWIDTH] IN BLOOD BY AUTOMATED COUNT: 15.1 % (ref 12.3–15.4)
GLUCOSE SERPL-MCNC: 153 MG/DL (ref 65–99)
HCT VFR BLD AUTO: 33.5 % (ref 34–46.6)
HGB BLD-MCNC: 11.1 G/DL (ref 12–15.9)
MCH RBC QN AUTO: 30 PG (ref 26.6–33)
MCHC RBC AUTO-ENTMCNC: 33.1 G/DL (ref 31.5–35.7)
MCV RBC AUTO: 90.5 FL (ref 79–97)
PLATELET # BLD AUTO: 261 10*3/MM3 (ref 140–450)
PMV BLD AUTO: 9.1 FL (ref 6–12)
POTASSIUM SERPL-SCNC: 4.2 MMOL/L (ref 3.5–5.2)
RBC # BLD AUTO: 3.7 10*6/MM3 (ref 3.77–5.28)
RH BLD: POSITIVE
SODIUM SERPL-SCNC: 140 MMOL/L (ref 136–145)
T&S EXPIRATION DATE: NORMAL
WBC NRBC COR # BLD AUTO: 10.29 10*3/MM3 (ref 3.4–10.8)

## 2025-03-18 PROCEDURE — 25010000002 HYDROMORPHONE PER 4 MG: Performed by: SURGERY

## 2025-03-18 PROCEDURE — 94799 UNLISTED PULMONARY SVC/PX: CPT

## 2025-03-18 PROCEDURE — 25810000003 LACTATED RINGERS PER 1000 ML: Performed by: SURGERY

## 2025-03-18 PROCEDURE — 86850 RBC ANTIBODY SCREEN: CPT | Performed by: SURGERY

## 2025-03-18 PROCEDURE — 25010000002 DEXAMETHASONE PER 1 MG: Performed by: ANESTHESIOLOGY

## 2025-03-18 PROCEDURE — 63710000001 ONDANSETRON ODT 4 MG TABLET DISPERSIBLE: Performed by: SURGERY

## 2025-03-18 PROCEDURE — 88305 TISSUE EXAM BY PATHOLOGIST: CPT | Performed by: SURGERY

## 2025-03-18 PROCEDURE — C1729 CATH, DRAINAGE: HCPCS | Performed by: SURGERY

## 2025-03-18 PROCEDURE — 71045 X-RAY EXAM CHEST 1 VIEW: CPT

## 2025-03-18 PROCEDURE — 88341 IMHCHEM/IMCYTCHM EA ADD ANTB: CPT | Performed by: SURGERY

## 2025-03-18 PROCEDURE — 25010000002 CEFAZOLIN PER 500 MG: Performed by: NURSE ANESTHETIST, CERTIFIED REGISTERED

## 2025-03-18 PROCEDURE — 25010000002 FENTANYL CITRATE (PF) 50 MCG/ML SOLUTION: Performed by: ANESTHESIOLOGY

## 2025-03-18 PROCEDURE — 25010000002 SUGAMMADEX 200 MG/2ML SOLUTION

## 2025-03-18 PROCEDURE — 32663 THORACOSCOPY W/LOBECTOMY: CPT | Performed by: SURGERY

## 2025-03-18 PROCEDURE — 25010000002 FENTANYL CITRATE (PF) 50 MCG/ML SOLUTION

## 2025-03-18 PROCEDURE — 25010000002 HYDROMORPHONE PER 4 MG: Performed by: ANESTHESIOLOGY

## 2025-03-18 PROCEDURE — 25010000002 DEXAMETHASONE PER 1 MG

## 2025-03-18 PROCEDURE — 07B70ZX EXCISION OF THORAX LYMPHATIC, OPEN APPROACH, DIAGNOSTIC: ICD-10-PCS | Performed by: SURGERY

## 2025-03-18 PROCEDURE — 25010000002 MIDAZOLAM PER 1MG: Performed by: ANESTHESIOLOGY

## 2025-03-18 PROCEDURE — 0B9L8ZX DRAINAGE OF LEFT LUNG, VIA NATURAL OR ARTIFICIAL OPENING ENDOSCOPIC, DIAGNOSTIC: ICD-10-PCS | Performed by: SURGERY

## 2025-03-18 PROCEDURE — 25010000002 LIDOCAINE PF 2% 2 % SOLUTION

## 2025-03-18 PROCEDURE — 88313 SPECIAL STAINS GROUP 2: CPT | Performed by: SURGERY

## 2025-03-18 PROCEDURE — 25010000002 CEFAZOLIN PER 500 MG: Performed by: SURGERY

## 2025-03-18 PROCEDURE — 25010000002 ONDANSETRON PER 1 MG

## 2025-03-18 PROCEDURE — 25010000002 PROPOFOL 10 MG/ML EMULSION

## 2025-03-18 PROCEDURE — 25010000002 BUPIVACAINE LIPOSOME 1.3 % SUSPENSION 20 ML VIAL: Performed by: SURGERY

## 2025-03-18 PROCEDURE — 86900 BLOOD TYPING SEROLOGIC ABO: CPT | Performed by: SURGERY

## 2025-03-18 PROCEDURE — 88342 IMHCHEM/IMCYTCHM 1ST ANTB: CPT | Performed by: SURGERY

## 2025-03-18 PROCEDURE — 85027 COMPLETE CBC AUTOMATED: CPT | Performed by: SURGERY

## 2025-03-18 PROCEDURE — 86901 BLOOD TYPING SEROLOGIC RH(D): CPT | Performed by: SURGERY

## 2025-03-18 PROCEDURE — 25010000002 HEPARIN (PORCINE) PER 1000 UNITS: Performed by: NURSE PRACTITIONER

## 2025-03-18 PROCEDURE — 80048 BASIC METABOLIC PNL TOTAL CA: CPT | Performed by: SURGERY

## 2025-03-18 PROCEDURE — 88309 TISSUE EXAM BY PATHOLOGIST: CPT | Performed by: SURGERY

## 2025-03-18 PROCEDURE — 88331 PATH CONSLTJ SURG 1 BLK 1SPC: CPT | Performed by: SURGERY

## 2025-03-18 PROCEDURE — 31624 DX BRONCHOSCOPE/LAVAGE: CPT | Performed by: SURGERY

## 2025-03-18 PROCEDURE — C1760 CLOSURE DEV, VASC: HCPCS | Performed by: SURGERY

## 2025-03-18 PROCEDURE — 32674 THORACOSCOPY LYMPH NODE EXC: CPT | Performed by: SURGERY

## 2025-03-18 PROCEDURE — 0BTG0ZZ RESECTION OF LEFT UPPER LUNG LOBE, OPEN APPROACH: ICD-10-PCS | Performed by: SURGERY

## 2025-03-18 DEVICE — PLEDGET INCISIONLINE REINF TFE SFT PTFE 1.5X3X7MM WHT: Type: IMPLANTABLE DEVICE | Site: LUNG | Status: FUNCTIONAL

## 2025-03-18 DEVICE — ABSORBABLE HEMOSTAT (OXIDIZED REGENERATED CELLULOSE, U.S.P.)
Type: IMPLANTABLE DEVICE | Site: LUNG | Status: FUNCTIONAL
Brand: SURGICEL

## 2025-03-18 DEVICE — PROXIMATE RELOADABLE LINEAR STAPLER, 30MM
Type: IMPLANTABLE DEVICE | Site: LUNG | Status: FUNCTIONAL
Brand: PROXIMATE

## 2025-03-18 DEVICE — ADHS SURG BIOGLUE PREF 5ML: Type: IMPLANTABLE DEVICE | Site: LUNG | Status: FUNCTIONAL

## 2025-03-18 DEVICE — ENDOPATH ECHELON VASCULAR  RELOADS, WHITE, 35MM
Type: IMPLANTABLE DEVICE | Site: LUNG | Status: FUNCTIONAL
Brand: ECHELON ENDOPATH

## 2025-03-18 DEVICE — THE ECHELON, ECHELON ENDOPATH™ AND ECHELON FLEX™ FAMILIES OF ENDOSCOPIC LINEAR CUTTERS AND RELOADS ARE STERILE, SINGLE PATIENT USE INSTRUMENTS THAT SIMULTANEOUSLY CUT AND STAPLE TISSUE. THERE ARE SIX STAGGERED ROWS OF STAPLES, THREE ON EITHER SIDE OF THE CUT LINE. THE 45 MM INSTRUMENTS HAVE A STAPLE LINE THATIS APPROXIMATELY 45 MM LONG AND A CUT LINE THAT IS APPROXIMATELY 42 MM LONG. THE SHAFT CAN ROTATE FREELY IN BOTH DIRECTIONS AND AN ARTICULATION MECHANISM ON ARTICULATING INSTRUMENTS ENABLES BENDING THE DISTAL PORTIONOF THE SHAFT TO FACILITATE LATERAL ACCESS OF THE OPERATIVE SITE.THE INSTRUMENTS ARE SHIPPED WITHOUT A RELOAD AND MUST BE LOADED PRIOR TO USE. A STAPLE RETAINING CAP ON THE RELOAD PROTECTS THE STAPLE LEG POINTS DURING SHIPPING AND TRANSPORTATION. THE INSTRUMENTS’ LOCK-OUT FEATURE IS DESIGNED TO PREVENT A USED RELOAD FROM BEING REFIRED.
Type: IMPLANTABLE DEVICE | Site: LUNG | Status: FUNCTIONAL
Brand: ECHELON ENDOPATH

## 2025-03-18 RX ORDER — OMEGA-3S/DHA/EPA/FISH OIL/D3 300MG-1000
400 CAPSULE ORAL DAILY
Status: DISCONTINUED | OUTPATIENT
Start: 2025-03-19 | End: 2025-03-21 | Stop reason: HOSPADM

## 2025-03-18 RX ORDER — HYDROMORPHONE HCL/0.9% NACL/PF 6 MG/30 ML
PATIENT CONTROLLED ANALGESIA SYRINGE INTRAVENOUS CONTINUOUS
Refills: 0 | Status: DISCONTINUED | OUTPATIENT
Start: 2025-03-18 | End: 2025-03-19

## 2025-03-18 RX ORDER — VARENICLINE TARTRATE 0.5 MG/1
1 TABLET, FILM COATED ORAL DAILY
Status: DISCONTINUED | OUTPATIENT
Start: 2025-03-19 | End: 2025-03-21 | Stop reason: HOSPADM

## 2025-03-18 RX ORDER — ONDANSETRON 4 MG/1
4 TABLET, ORALLY DISINTEGRATING ORAL EVERY 6 HOURS PRN
Status: DISCONTINUED | OUTPATIENT
Start: 2025-03-18 | End: 2025-03-21 | Stop reason: HOSPADM

## 2025-03-18 RX ORDER — FLUMAZENIL 0.1 MG/ML
0.2 INJECTION INTRAVENOUS AS NEEDED
Status: DISCONTINUED | OUTPATIENT
Start: 2025-03-18 | End: 2025-03-18 | Stop reason: HOSPADM

## 2025-03-18 RX ORDER — IBUPROFEN 600 MG/1
600 TABLET, FILM COATED ORAL EVERY 6 HOURS PRN
Status: DISCONTINUED | OUTPATIENT
Start: 2025-03-18 | End: 2025-03-18 | Stop reason: HOSPADM

## 2025-03-18 RX ORDER — HEPARIN SODIUM 5000 [USP'U]/ML
5000 INJECTION, SOLUTION INTRAVENOUS; SUBCUTANEOUS ONCE
Status: COMPLETED | OUTPATIENT
Start: 2025-03-18 | End: 2025-03-18

## 2025-03-18 RX ORDER — HYDROMORPHONE HYDROCHLORIDE 1 MG/ML
0.5 INJECTION, SOLUTION INTRAMUSCULAR; INTRAVENOUS; SUBCUTANEOUS
Status: DISCONTINUED | OUTPATIENT
Start: 2025-03-18 | End: 2025-03-18 | Stop reason: HOSPADM

## 2025-03-18 RX ORDER — LABETALOL HYDROCHLORIDE 5 MG/ML
5 INJECTION, SOLUTION INTRAVENOUS
Status: DISCONTINUED | OUTPATIENT
Start: 2025-03-18 | End: 2025-03-18 | Stop reason: HOSPADM

## 2025-03-18 RX ORDER — DEXAMETHASONE SODIUM PHOSPHATE 4 MG/ML
4 INJECTION, SOLUTION INTRA-ARTICULAR; INTRALESIONAL; INTRAMUSCULAR; INTRAVENOUS; SOFT TISSUE ONCE AS NEEDED
Status: COMPLETED | OUTPATIENT
Start: 2025-03-18 | End: 2025-03-18

## 2025-03-18 RX ORDER — SODIUM CHLORIDE 0.9 % (FLUSH) 0.9 %
10 SYRINGE (ML) INJECTION EVERY 12 HOURS SCHEDULED
Status: DISCONTINUED | OUTPATIENT
Start: 2025-03-18 | End: 2025-03-18 | Stop reason: HOSPADM

## 2025-03-18 RX ORDER — ONDANSETRON 2 MG/ML
4 INJECTION INTRAMUSCULAR; INTRAVENOUS
Status: DISCONTINUED | OUTPATIENT
Start: 2025-03-18 | End: 2025-03-18 | Stop reason: HOSPADM

## 2025-03-18 RX ORDER — SODIUM CHLORIDE 0.9 % (FLUSH) 0.9 %
3-10 SYRINGE (ML) INJECTION AS NEEDED
Status: DISCONTINUED | OUTPATIENT
Start: 2025-03-18 | End: 2025-03-18 | Stop reason: HOSPADM

## 2025-03-18 RX ORDER — SODIUM CHLORIDE 0.9 % (FLUSH) 0.9 %
3 SYRINGE (ML) INJECTION EVERY 12 HOURS SCHEDULED
Status: DISCONTINUED | OUTPATIENT
Start: 2025-03-18 | End: 2025-03-18 | Stop reason: HOSPADM

## 2025-03-18 RX ORDER — SODIUM CHLORIDE 9 MG/ML
40 INJECTION, SOLUTION INTRAVENOUS AS NEEDED
Status: DISCONTINUED | OUTPATIENT
Start: 2025-03-18 | End: 2025-03-18 | Stop reason: HOSPADM

## 2025-03-18 RX ORDER — ACETAMINOPHEN 500 MG
1000 TABLET ORAL ONCE
Status: COMPLETED | OUTPATIENT
Start: 2025-03-18 | End: 2025-03-18

## 2025-03-18 RX ORDER — SODIUM CHLORIDE 0.9 % (FLUSH) 0.9 %
10 SYRINGE (ML) INJECTION AS NEEDED
Status: DISCONTINUED | OUTPATIENT
Start: 2025-03-18 | End: 2025-03-18 | Stop reason: HOSPADM

## 2025-03-18 RX ORDER — SODIUM CHLORIDE, SODIUM LACTATE, POTASSIUM CHLORIDE, CALCIUM CHLORIDE 600; 310; 30; 20 MG/100ML; MG/100ML; MG/100ML; MG/100ML
1000 INJECTION, SOLUTION INTRAVENOUS CONTINUOUS
Status: DISCONTINUED | OUTPATIENT
Start: 2025-03-18 | End: 2025-03-18 | Stop reason: HOSPADM

## 2025-03-18 RX ORDER — IBUPROFEN 600 MG/1
600 TABLET, FILM COATED ORAL EVERY 6 HOURS SCHEDULED
Status: DISCONTINUED | OUTPATIENT
Start: 2025-03-18 | End: 2025-03-21 | Stop reason: HOSPADM

## 2025-03-18 RX ORDER — DOCUSATE SODIUM 100 MG/1
100 CAPSULE, LIQUID FILLED ORAL DAILY
Status: DISCONTINUED | OUTPATIENT
Start: 2025-03-18 | End: 2025-03-21 | Stop reason: HOSPADM

## 2025-03-18 RX ORDER — ACETYLCYSTEINE 200 MG/ML
3 SOLUTION ORAL; RESPIRATORY (INHALATION)
Status: DISPENSED | OUTPATIENT
Start: 2025-03-18 | End: 2025-03-20

## 2025-03-18 RX ORDER — ONDANSETRON 2 MG/ML
INJECTION INTRAMUSCULAR; INTRAVENOUS AS NEEDED
Status: DISCONTINUED | OUTPATIENT
Start: 2025-03-18 | End: 2025-03-18 | Stop reason: SURG

## 2025-03-18 RX ORDER — ACETAMINOPHEN 160 MG/5ML
650 SOLUTION ORAL EVERY 8 HOURS
Status: COMPLETED | OUTPATIENT
Start: 2025-03-18 | End: 2025-03-20

## 2025-03-18 RX ORDER — ROCURONIUM BROMIDE 10 MG/ML
INJECTION, SOLUTION INTRAVENOUS AS NEEDED
Status: DISCONTINUED | OUTPATIENT
Start: 2025-03-18 | End: 2025-03-18 | Stop reason: SURG

## 2025-03-18 RX ORDER — DEXAMETHASONE SODIUM PHOSPHATE 4 MG/ML
INJECTION, SOLUTION INTRA-ARTICULAR; INTRALESIONAL; INTRAMUSCULAR; INTRAVENOUS; SOFT TISSUE AS NEEDED
Status: DISCONTINUED | OUTPATIENT
Start: 2025-03-18 | End: 2025-03-18 | Stop reason: SURG

## 2025-03-18 RX ORDER — BUPROPION HYDROCHLORIDE 150 MG/1
150 TABLET, EXTENDED RELEASE ORAL EVERY 24 HOURS
Status: DISCONTINUED | OUTPATIENT
Start: 2025-03-18 | End: 2025-03-21 | Stop reason: HOSPADM

## 2025-03-18 RX ORDER — POLYETHYLENE GLYCOL 3350 17 G/17G
17 POWDER, FOR SOLUTION ORAL DAILY
Status: DISCONTINUED | OUTPATIENT
Start: 2025-03-18 | End: 2025-03-21 | Stop reason: HOSPADM

## 2025-03-18 RX ORDER — FENTANYL CITRATE 50 UG/ML
50 INJECTION, SOLUTION INTRAMUSCULAR; INTRAVENOUS
Status: COMPLETED | OUTPATIENT
Start: 2025-03-18 | End: 2025-03-18

## 2025-03-18 RX ORDER — SODIUM CHLORIDE, SODIUM LACTATE, POTASSIUM CHLORIDE, CALCIUM CHLORIDE 600; 310; 30; 20 MG/100ML; MG/100ML; MG/100ML; MG/100ML
40 INJECTION, SOLUTION INTRAVENOUS CONTINUOUS
Status: DISCONTINUED | OUTPATIENT
Start: 2025-03-18 | End: 2025-03-21

## 2025-03-18 RX ORDER — SODIUM CHLORIDE 9 MG/ML
20 INJECTION, SOLUTION INTRAVENOUS CONTINUOUS PRN
Status: DISPENSED | OUTPATIENT
Start: 2025-03-18 | End: 2025-03-18

## 2025-03-18 RX ORDER — SODIUM CHLORIDE 0.9 % (FLUSH) 0.9 %
3 SYRINGE (ML) INJECTION AS NEEDED
Status: DISCONTINUED | OUTPATIENT
Start: 2025-03-18 | End: 2025-03-18 | Stop reason: HOSPADM

## 2025-03-18 RX ORDER — HYDROXYZINE HYDROCHLORIDE 10 MG/1
10 TABLET, FILM COATED ORAL 3 TIMES DAILY PRN
Status: DISCONTINUED | OUTPATIENT
Start: 2025-03-18 | End: 2025-03-21 | Stop reason: HOSPADM

## 2025-03-18 RX ORDER — VARENICLINE TARTRATE 1 MG/1
1 TABLET, FILM COATED ORAL DAILY
COMMUNITY
Start: 2025-03-17 | End: 2025-07-15

## 2025-03-18 RX ORDER — NALOXONE HCL 0.4 MG/ML
0.04 VIAL (ML) INJECTION AS NEEDED
Status: DISCONTINUED | OUTPATIENT
Start: 2025-03-18 | End: 2025-03-18 | Stop reason: HOSPADM

## 2025-03-18 RX ORDER — ONDANSETRON 2 MG/ML
4 INJECTION INTRAMUSCULAR; INTRAVENOUS EVERY 6 HOURS PRN
Status: DISCONTINUED | OUTPATIENT
Start: 2025-03-18 | End: 2025-03-21 | Stop reason: HOSPADM

## 2025-03-18 RX ORDER — IPRATROPIUM BROMIDE AND ALBUTEROL SULFATE 2.5; .5 MG/3ML; MG/3ML
3 SOLUTION RESPIRATORY (INHALATION)
Status: DISPENSED | OUTPATIENT
Start: 2025-03-18 | End: 2025-03-20

## 2025-03-18 RX ORDER — NALOXONE HCL 0.4 MG/ML
0.1 VIAL (ML) INJECTION
Status: DISCONTINUED | OUTPATIENT
Start: 2025-03-18 | End: 2025-03-21 | Stop reason: HOSPADM

## 2025-03-18 RX ORDER — ENOXAPARIN SODIUM 100 MG/ML
40 INJECTION SUBCUTANEOUS DAILY
Status: DISCONTINUED | OUTPATIENT
Start: 2025-03-19 | End: 2025-03-21 | Stop reason: HOSPADM

## 2025-03-18 RX ORDER — OXYCODONE AND ACETAMINOPHEN 10; 325 MG/1; MG/1
1 TABLET ORAL EVERY 4 HOURS PRN
Status: DISCONTINUED | OUTPATIENT
Start: 2025-03-18 | End: 2025-03-18 | Stop reason: HOSPADM

## 2025-03-18 RX ORDER — BUPIVACAINE HCL/0.9 % NACL/PF 0.125 %
PLASTIC BAG, INJECTION (ML) EPIDURAL AS NEEDED
Status: DISCONTINUED | OUTPATIENT
Start: 2025-03-18 | End: 2025-03-18 | Stop reason: SURG

## 2025-03-18 RX ORDER — ASCORBIC ACID 500 MG
250 TABLET ORAL DAILY
Status: DISCONTINUED | OUTPATIENT
Start: 2025-03-19 | End: 2025-03-21 | Stop reason: HOSPADM

## 2025-03-18 RX ORDER — LIDOCAINE HYDROCHLORIDE 10 MG/ML
0.5 INJECTION, SOLUTION EPIDURAL; INFILTRATION; INTRACAUDAL; PERINEURAL ONCE AS NEEDED
Status: DISCONTINUED | OUTPATIENT
Start: 2025-03-18 | End: 2025-03-18 | Stop reason: HOSPADM

## 2025-03-18 RX ORDER — ACETAMINOPHEN 325 MG/1
650 TABLET ORAL EVERY 8 HOURS
Status: COMPLETED | OUTPATIENT
Start: 2025-03-18 | End: 2025-03-20

## 2025-03-18 RX ORDER — FENTANYL CITRATE 50 UG/ML
INJECTION, SOLUTION INTRAMUSCULAR; INTRAVENOUS AS NEEDED
Status: DISCONTINUED | OUTPATIENT
Start: 2025-03-18 | End: 2025-03-18 | Stop reason: SURG

## 2025-03-18 RX ORDER — SODIUM CHLORIDE, SODIUM LACTATE, POTASSIUM CHLORIDE, CALCIUM CHLORIDE 600; 310; 30; 20 MG/100ML; MG/100ML; MG/100ML; MG/100ML
100 INJECTION, SOLUTION INTRAVENOUS CONTINUOUS
Status: DISCONTINUED | OUTPATIENT
Start: 2025-03-18 | End: 2025-03-18

## 2025-03-18 RX ORDER — CEFAZOLIN SODIUM 1 G/3ML
INJECTION, POWDER, FOR SOLUTION INTRAMUSCULAR; INTRAVENOUS AS NEEDED
Status: DISCONTINUED | OUTPATIENT
Start: 2025-03-18 | End: 2025-03-18 | Stop reason: SURG

## 2025-03-18 RX ORDER — PROPOFOL 10 MG/ML
VIAL (ML) INTRAVENOUS AS NEEDED
Status: DISCONTINUED | OUTPATIENT
Start: 2025-03-18 | End: 2025-03-18 | Stop reason: SURG

## 2025-03-18 RX ORDER — ACETAMINOPHEN 650 MG/1
650 SUPPOSITORY RECTAL EVERY 8 HOURS
Status: COMPLETED | OUTPATIENT
Start: 2025-03-18 | End: 2025-03-20

## 2025-03-18 RX ORDER — LIDOCAINE HYDROCHLORIDE 20 MG/ML
INJECTION, SOLUTION EPIDURAL; INFILTRATION; INTRACAUDAL; PERINEURAL AS NEEDED
Status: DISCONTINUED | OUTPATIENT
Start: 2025-03-18 | End: 2025-03-18 | Stop reason: SURG

## 2025-03-18 RX ORDER — MULTIPLE VITAMINS W/ MINERALS TAB 9MG-400MCG
1 TAB ORAL DAILY
Status: DISCONTINUED | OUTPATIENT
Start: 2025-03-18 | End: 2025-03-21 | Stop reason: HOSPADM

## 2025-03-18 RX ORDER — MIDAZOLAM HYDROCHLORIDE 2 MG/2ML
2 INJECTION, SOLUTION INTRAMUSCULAR; INTRAVENOUS
Status: DISCONTINUED | OUTPATIENT
Start: 2025-03-18 | End: 2025-03-18 | Stop reason: HOSPADM

## 2025-03-18 RX ORDER — ZINC SULFATE 50(220)MG
220 CAPSULE ORAL DAILY
Status: DISCONTINUED | OUTPATIENT
Start: 2025-03-19 | End: 2025-03-21 | Stop reason: HOSPADM

## 2025-03-18 RX ORDER — BISACODYL 10 MG
10 SUPPOSITORY, RECTAL RECTAL DAILY PRN
Status: DISCONTINUED | OUTPATIENT
Start: 2025-03-18 | End: 2025-03-21 | Stop reason: HOSPADM

## 2025-03-18 RX ADMIN — HYDROMORPHONE HYDROCHLORIDE 0.5 MG: 1 INJECTION, SOLUTION INTRAMUSCULAR; INTRAVENOUS; SUBCUTANEOUS at 16:05

## 2025-03-18 RX ADMIN — Medication 100 MCG: at 14:56

## 2025-03-18 RX ADMIN — Medication 100 MCG: at 14:04

## 2025-03-18 RX ADMIN — SUGAMMADEX 200 MG: 100 INJECTION, SOLUTION INTRAVENOUS at 15:19

## 2025-03-18 RX ADMIN — IPRATROPIUM BROMIDE AND ALBUTEROL SULFATE 3 ML: .5; 3 SOLUTION RESPIRATORY (INHALATION) at 19:54

## 2025-03-18 RX ADMIN — FENTANYL CITRATE 100 MCG: 50 INJECTION, SOLUTION INTRAMUSCULAR; INTRAVENOUS at 15:25

## 2025-03-18 RX ADMIN — POLYETHYLENE GLYCOL 3350 17 G: 17 POWDER, FOR SOLUTION ORAL at 17:38

## 2025-03-18 RX ADMIN — FENTANYL CITRATE 50 MCG: 50 INJECTION, SOLUTION INTRAMUSCULAR; INTRAVENOUS at 16:09

## 2025-03-18 RX ADMIN — Medication 100 MCG: at 11:32

## 2025-03-18 RX ADMIN — FENTANYL CITRATE 50 MCG: 50 INJECTION, SOLUTION INTRAMUSCULAR; INTRAVENOUS at 11:54

## 2025-03-18 RX ADMIN — Medication 100 MCG: at 12:40

## 2025-03-18 RX ADMIN — CEFAZOLIN 2 G: 1 INJECTION, POWDER, FOR SOLUTION INTRAMUSCULAR; INTRAVENOUS; PARENTERAL at 15:10

## 2025-03-18 RX ADMIN — CEFAZOLIN 2 G: 1 INJECTION, POWDER, FOR SOLUTION INTRAMUSCULAR; INTRAVENOUS; PARENTERAL at 11:12

## 2025-03-18 RX ADMIN — Medication 100 MCG: at 15:17

## 2025-03-18 RX ADMIN — DEXAMETHASONE SODIUM PHOSPHATE 4 MG: 4 INJECTION, SOLUTION INTRA-ARTICULAR; INTRALESIONAL; INTRAMUSCULAR; INTRAVENOUS; SOFT TISSUE at 10:35

## 2025-03-18 RX ADMIN — SODIUM CHLORIDE, POTASSIUM CHLORIDE, SODIUM LACTATE AND CALCIUM CHLORIDE 1000 ML: 600; 310; 30; 20 INJECTION, SOLUTION INTRAVENOUS at 09:59

## 2025-03-18 RX ADMIN — ACETYLCYSTEINE 3 ML: 200 SOLUTION ORAL; RESPIRATORY (INHALATION) at 19:54

## 2025-03-18 RX ADMIN — HEPARIN SODIUM 5000 UNITS: 5000 INJECTION, SOLUTION INTRAVENOUS; SUBCUTANEOUS at 10:33

## 2025-03-18 RX ADMIN — BUPROPION HYDROCHLORIDE 150 MG: 150 TABLET, FILM COATED, EXTENDED RELEASE ORAL at 20:17

## 2025-03-18 RX ADMIN — Medication 100 MCG: at 13:12

## 2025-03-18 RX ADMIN — ACETAMINOPHEN 1000 MG: 500 TABLET, FILM COATED ORAL at 10:35

## 2025-03-18 RX ADMIN — IBUPROFEN 600 MG: 600 TABLET ORAL at 18:59

## 2025-03-18 RX ADMIN — Medication 10 ML: at 09:55

## 2025-03-18 RX ADMIN — ROCURONIUM BROMIDE 100 MG: 10 INJECTION, SOLUTION INTRAVENOUS at 11:04

## 2025-03-18 RX ADMIN — LIDOCAINE HYDROCHLORIDE 80 MG: 20 INJECTION, SOLUTION EPIDURAL; INFILTRATION; INTRACAUDAL; PERINEURAL at 11:04

## 2025-03-18 RX ADMIN — Medication 1 TABLET: at 17:38

## 2025-03-18 RX ADMIN — HYDROMORPHONE HYDROCHLORIDE 0.5 MG: 1 INJECTION, SOLUTION INTRAMUSCULAR; INTRAVENOUS; SUBCUTANEOUS at 15:41

## 2025-03-18 RX ADMIN — ACETAMINOPHEN 650 MG: 325 TABLET, FILM COATED ORAL at 17:57

## 2025-03-18 RX ADMIN — PROPOFOL 50 MG: 10 INJECTION, EMULSION INTRAVENOUS at 15:15

## 2025-03-18 RX ADMIN — ROCURONIUM BROMIDE 50 MG: 10 INJECTION, SOLUTION INTRAVENOUS at 13:50

## 2025-03-18 RX ADMIN — Medication 100 MCG: at 13:59

## 2025-03-18 RX ADMIN — Medication 100 MCG: at 13:56

## 2025-03-18 RX ADMIN — PROPOFOL 150 MG: 10 INJECTION, EMULSION INTRAVENOUS at 11:04

## 2025-03-18 RX ADMIN — DEXAMETHASONE SODIUM PHOSPHATE 8 MG: 4 INJECTION, SOLUTION INTRA-ARTICULAR; INTRALESIONAL; INTRAMUSCULAR; INTRAVENOUS; SOFT TISSUE at 14:45

## 2025-03-18 RX ADMIN — Medication 100 MCG: at 13:46

## 2025-03-18 RX ADMIN — MIDAZOLAM HYDROCHLORIDE 2 MG: 1 INJECTION, SOLUTION INTRAMUSCULAR; INTRAVENOUS at 10:35

## 2025-03-18 RX ADMIN — IBUPROFEN 600 MG: 600 TABLET ORAL at 23:02

## 2025-03-18 RX ADMIN — DOCUSATE SODIUM 100 MG: 100 CAPSULE, LIQUID FILLED ORAL at 17:38

## 2025-03-18 RX ADMIN — CEFAZOLIN 2 G: 2 INJECTION, POWDER, FOR SOLUTION INTRAMUSCULAR; INTRAVENOUS at 23:01

## 2025-03-18 RX ADMIN — Medication 100 MCG: at 13:25

## 2025-03-18 RX ADMIN — ONDANSETRON 4 MG: 2 INJECTION INTRAMUSCULAR; INTRAVENOUS at 14:45

## 2025-03-18 RX ADMIN — Medication 100 MCG: at 14:35

## 2025-03-18 RX ADMIN — Medication 100 MCG: at 13:00

## 2025-03-18 RX ADMIN — FENTANYL CITRATE 100 MCG: 50 INJECTION, SOLUTION INTRAMUSCULAR; INTRAVENOUS at 11:50

## 2025-03-18 RX ADMIN — FENTANYL CITRATE 50 MCG: 50 INJECTION, SOLUTION INTRAMUSCULAR; INTRAVENOUS at 15:45

## 2025-03-18 RX ADMIN — OXYCODONE AND ACETAMINOPHEN 1 TABLET: 325; 10 TABLET ORAL at 16:32

## 2025-03-18 RX ADMIN — FENTANYL CITRATE 100 MCG: 50 INJECTION, SOLUTION INTRAMUSCULAR; INTRAVENOUS at 11:01

## 2025-03-18 RX ADMIN — FENTANYL CITRATE 50 MCG: 50 INJECTION, SOLUTION INTRAMUSCULAR; INTRAVENOUS at 13:53

## 2025-03-18 RX ADMIN — HYDROMORPHONE HYDROCHLORIDE: 2 INJECTION, SOLUTION INTRAMUSCULAR; INTRAVENOUS; SUBCUTANEOUS at 17:38

## 2025-03-18 RX ADMIN — SODIUM CHLORIDE, SODIUM LACTATE, POTASSIUM CHLORIDE, CALCIUM CHLORIDE 40 ML/HR: 20; 30; 600; 310 INJECTION, SOLUTION INTRAVENOUS at 17:37

## 2025-03-18 RX ADMIN — ONDANSETRON 4 MG: 4 TABLET, ORALLY DISINTEGRATING ORAL at 23:02

## 2025-03-18 NOTE — ANESTHESIA PROCEDURE NOTES
Airway  Reason: elective    Date/Time: 3/18/2025 11:07 AM  Airway not difficult    General Information and Staff    Patient location during procedure: OR  CRNA/CAA: Krista Mckenna CRNA    Indications and Patient Condition  Indications for airway management: airway protection    Preoxygenated: yes    Mask difficulty assessment: 1 - vent by mask    Final Airway Details    Final airway type: endotracheal airway      Successful airway: EBT - double lumen left  Cuffed: yes   Successful intubation technique: video laryngoscopy  Adjuncts used in placement: intubating stylet  Endotracheal tube insertion site: oral  Blade: Washburn  Blade size: 3 (3.5)  EBT DL size (fr): 37  Cormack-Lehane Classification: grade IIa - partial view of glottis  Placement verified by: chest auscultation and capnometry   Number of attempts at approach: 1  Assessment: lips, teeth, and gum same as pre-op and atraumatic intubation

## 2025-03-18 NOTE — ANESTHESIA PROCEDURE NOTES
Arterial Line      Patient reassessed immediately prior to procedure    Patient location during procedure: pre-op  Start time: 3/18/2025 10:35 AM   Line placed for hemodynamic monitoring, ABGs/Labs/ISTAT and MD/Surgeon request.  Performed By   Anesthesiologist: Sejal Faulkner MD   Preanesthetic Checklist  Completed: patient identified, IV checked, site marked, risks and benefits discussed, surgical consent, monitors and equipment checked, pre-op evaluation and timeout performed  Arterial Line Prep    Sterile Tech: gloves and sterile barriers  Prep: ChloraPrep  Patient monitoring: blood pressure monitoring, continuous pulse oximetry and EKG  Arterial Line Procedure   Laterality:right  Location:  radial artery  Catheter size: 20 G   Guidance: ultrasound guided  PROCEDURE NOTE/ULTRASOUND INTERPRETATION.  Using ultrasound guidance the potential vascular sites for insertion of the catheter were visualized to determine the patency of the vessel to be used for vascular access.  After selecting the appropriate site for insertion, the needle was visualized under ultrasound being inserted into the radial artery, followed by ultrasound confirmation of wire and catheter placement. There were no abnormalities seen on ultrasound; an image was taken; and the patient tolerated the procedure with no complications.   Number of attempts: 1  Successful placement: yes   Post Assessment   Dressing Type: occlusive dressing applied, secured with tape and wrist guard applied.   Complications no  Circ/Move/Sens Assessment: normal and unchanged.   Patient Tolerance: patient tolerated the procedure well with no apparent complications

## 2025-03-18 NOTE — PROGRESS NOTES
[facility] Cardiothoracic Surgery  PROGRESS NOTE   CC: F/U after Neoadjuvant Radiation and Chemo    Subjective:   History of Present Illness  The patient presents for evaluation of a lung mass.    She reports no pain associated with the lung mass, which was incidentally discovered during a CT scan performed as part of a routine health check. She has not experienced any episodes of coughing. She has expressed concerns regarding the potential impact of the proposed surgical intervention on her quality of life. She has requested a prescription for Chantix to aid in smoking cessation prior to surgery. She has previously attempted to quit smoking using lozenges and patches but found them ineffective and experienced skin irritation with the patches. She is currently employed part-time at a [employer].    She underwent radiation therapy, which she tolerated well, even gaining weight during the process. She did not experience any gastrointestinal disturbances such as nausea or vomiting. She completed her last radiation session on 02/01/2025.    SOCIAL HISTORY  The patient admits to smoking approximately 3 to 3.5 cigarettes a day. She is currently employed part-time at a [employer].      ROS: Cardiovascular ROS: no chest pain or dyspnea on exertion      Objective:        Physical Exam  PE:  Vitals:    03/13/25 1035   BP: 100/62   Pulse: 85   SpO2: 96%     GENERAL: NAD, resting comfortably, normal palor  CARDIOVASCULAR: regular, regular rate, sinus  PULMONARY: Normal bilateral breath sounds, no labored breathing  ABDOMEN: soft, nt/nd  EXTREMITIES: mild peripheral edema, normal pulses, normal ROM        Lab Results (last 72 hours)       ** No results found for the last 72 hours. **              Results  I personally reviewed CT scan of the chest the following is my interpretation:  Anterior left upper lobe mass smaller in size with appropriate treatment response after radiation, this has shrunk  but towards the chest wall and still involves chest wall.  No evidence of new metastatic disease and no significant mediastinal adenopathy.    Assessment & Plan     Assessment & Plan    Ms. Stack is a 64-year-old female she follows up with me after having neoadjuvant therapy for an adenocarcinoma left upper lobe that likely involves left chest wall.  She has had positive treatment response with much smaller appearing mass but it still does appear to abut and possibly involve the left anterior chest wall.  She continues to smoke but is trying hard to quit.  There is no evidence of metastatic disease on repeat imaging.    Discussed with the patient and her  today the natural history of lung cancer and patient such as her.  I would recommend surgical resection including chest wall resection to obtain adequate margin.  We discussed this occurring through open thoracotomy in order to allow adequate chest wall resection.  I discussed with her operative expectations of this as well as risk and benefits, she understands and wants to proceed. We discussed the risk and benefits of surgery and alternatives including but not limited to bleeding, infection, injury to major vessels or organs, need for transfusion, need for conversion to open operation, pneumonia, prolonged airleak, risk of anesthesia, and/or death.  She understands these risk and agrees to proceed.    I have reviewed her PFT she has adequate lung reserve to tolerate anatomic lobectomy.  We will plan on surgery next week.  Thank you for trusting me with the care of Ms. Stack.  Please not hesitate to call questions or concerns.        Jaime Beltre MD   Cardiothoracic Surgeon    Patient or patient representative verbalized consent for the use of Ambient Listening during the visit with  Jaime Beltre MD for chart documentation. 3/18/2025  11:08 CDT

## 2025-03-18 NOTE — ANESTHESIA PROCEDURE NOTES
Airway  Date/Time: 3/18/2025 3:15 PM  Airway not difficult    General Information and Staff    Patient location during procedure: OR  CRNA/CAA: Krista Mckenna CRNA    Indications and Patient Condition  Indications for airway management: airway protection    Preoxygenated: yes    Mask difficulty assessment: 0 - not attempted    Final Airway Details    Final airway type: endotracheal airway      Successful airway: ETT  Cuffed: yes   Successful intubation technique: video laryngoscopy  Adjuncts used in placement: intubating stylet  Endotracheal tube insertion site: oral  Blade: Washburn  Blade size: 3  ETT size (mm): 8.0  Cormack-Lehane Classification: grade I - full view of glottis  Placement verified by: chest auscultation and capnometry   Cuff volume (mL): 7  Measured from: teeth  Number of attempts at approach: 1  Assessment: lips, teeth, and gum same as pre-op and atraumatic intubation

## 2025-03-18 NOTE — PLAN OF CARE
Goal Outcome Evaluation:  Plan of Care Reviewed With: patient        Progress: no change  Outcome Evaluation: Pt A&O x4. VSS. C/o pain. PCA pump initiated. Pt educated on use. 2 chesttubes y'd into 1. Sites C/D/I. No subq emphysema present.  K[place] in place. Safety maintained.

## 2025-03-18 NOTE — ANESTHESIA POSTPROCEDURE EVALUATION
Patient: Deana Stack    Procedure Summary       Date: 03/18/25 Room / Location:  [facility] OR  /  [facility]    Anesthesia Start: 1100 Anesthesia Stop: 1530    Procedures:       LEFT THORACOTOMY (Left: Chest)      LEFT LOBECTOMY WITH CHEST WALL RESECTION (Left: Chest)      MEDIASTINAL LYMPH NODE DISSECTION (Chest) Diagnosis:       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      (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 [C34.12])    Surgeons: Jaime Beltre MD Provider: Krista Mckenna CRNA    Anesthesia Type: general, [place] ASA Status: 3            Anesthesia Type: general, [place]    Vitals  Vitals Value Taken Time   /69 03/18/25 16:45   Temp 97.9 °F (36.6 °C) 03/18/25 16:40   Pulse 85 03/18/25 16:45   Resp 14 03/18/25 16:45   SpO2 100 % 03/18/25 16:45           Post Anesthesia Care and Evaluation    Patient location during evaluation: PACU  Patient participation: complete - patient participated  Level of consciousness: awake and alert  Pain management: adequate    Airway patency: patent  Anesthetic complications: No anesthetic complications    Cardiovascular status: acceptable  Respiratory status: acceptable  Hydration status: acceptable    Comments: Blood pressure 117/57, pulse 85, temperature 97.5 °F (36.4 °C), temperature source Axillary, resp. rate 16, weight 63.2 kg (139 lb 5.3 oz), SpO2 97%, not currently breastfeeding.    Pt discharged from PACU based on ashley score >8

## 2025-03-18 NOTE — H&P (VIEW-ONLY)
[facility] Cardiothoracic Surgery  PROGRESS NOTE   CC: F/U after Neoadjuvant Radiation and Chemo    Subjective:   History of Present Illness  The patient presents for evaluation of a lung mass.    She reports no pain associated with the lung mass, which was incidentally discovered during a CT scan performed as part of a routine health check. She has not experienced any episodes of coughing. She has expressed concerns regarding the potential impact of the proposed surgical intervention on her quality of life. She has requested a prescription for Chantix to aid in smoking cessation prior to surgery. She has previously attempted to quit smoking using lozenges and patches but found them ineffective and experienced skin irritation with the patches. She is currently employed part-time at a [employer].    She underwent radiation therapy, which she tolerated well, even gaining weight during the process. She did not experience any gastrointestinal disturbances such as nausea or vomiting. She completed her last radiation session on 02/01/2025.    SOCIAL HISTORY  The patient admits to smoking approximately 3 to 3.5 cigarettes a day. She is currently employed part-time at a [employer].      ROS: Cardiovascular ROS: no chest pain or dyspnea on exertion      Objective:        Physical Exam  PE:  Vitals:    03/13/25 1035   BP: 100/62   Pulse: 85   SpO2: 96%     GENERAL: NAD, resting comfortably, normal palor  CARDIOVASCULAR: regular, regular rate, sinus  PULMONARY: Normal bilateral breath sounds, no labored breathing  ABDOMEN: soft, nt/nd  EXTREMITIES: mild peripheral edema, normal pulses, normal ROM        Lab Results (last 72 hours)       ** No results found for the last 72 hours. **              Results  I personally reviewed CT scan of the chest the following is my interpretation:  Anterior left upper lobe mass smaller in size with appropriate treatment response after radiation, this has shrunk  but towards the chest wall and still involves chest wall.  No evidence of new metastatic disease and no significant mediastinal adenopathy.    Assessment & Plan     Assessment & Plan    Ms. Stack is a 64-year-old female she follows up with me after having neoadjuvant therapy for an adenocarcinoma left upper lobe that likely involves left chest wall.  She has had positive treatment response with much smaller appearing mass but it still does appear to abut and possibly involve the left anterior chest wall.  She continues to smoke but is trying hard to quit.  There is no evidence of metastatic disease on repeat imaging.    Discussed with the patient and her  today the natural history of lung cancer and patient such as her.  I would recommend surgical resection including chest wall resection to obtain adequate margin.  We discussed this occurring through open thoracotomy in order to allow adequate chest wall resection.  I discussed with her operative expectations of this as well as risk and benefits, she understands and wants to proceed. We discussed the risk and benefits of surgery and alternatives including but not limited to bleeding, infection, injury to major vessels or organs, need for transfusion, need for conversion to open operation, pneumonia, prolonged airleak, risk of anesthesia, and/or death.  She understands these risk and agrees to proceed.    I have reviewed her PFT she has adequate lung reserve to tolerate anatomic lobectomy.  We will plan on surgery next week.  Thank you for trusting me with the care of Ms. Stack.  Please not hesitate to call questions or concerns.        Jaime Beltre MD   Cardiothoracic Surgeon    Patient or patient representative verbalized consent for the use of Ambient Listening during the visit with  Jaime Beltre MD for chart documentation. 3/18/2025  11:08 CDT

## 2025-03-18 NOTE — ANESTHESIA PREPROCEDURE EVALUATION
Anesthesia Evaluation     Patient summary reviewed   no history of anesthetic complications:   NPO Solid Status: > 8 hours  NPO Liquid Status: > 8 hours           Airway   Mallampati: I  TM distance: >3 FB  No difficulty expected  Dental      Pulmonary    (+) a smoker Current, lung cancer,  (-) COPD, asthma, sleep apnea  Cardiovascular   Exercise tolerance: good (4-7 METS)    (-) pacemaker, past MI, angina, cardiac stents      Neuro/Psych  (-) seizures, TIA, CVA  GI/Hepatic/Renal/Endo    (+) GERD  (-) liver disease, no renal disease, diabetes    Musculoskeletal     Abdominal    Substance History      OB/GYN          Other                      Anesthesia Plan    ASA 3     general and [place]     intravenous induction     Anesthetic plan, risks, benefits, and alternatives have been provided, discussed and informed consent has been obtained with: patient.    Use of blood products discussed with patient  Consented to blood products.        CODE STATUS:

## 2025-03-18 NOTE — OP NOTE
Cardiothoracic Surgery Operative Note     Date of Procedure: 3/18/2025     Pre-op diagnosis: Lung Cancer, Left Upper Lobe, Concern for Chest Wall Invasion  Current Active Smoker  COPD  GERD     Post-op diagnosis: Same with Without Chest Wall Invasion     Procedure:  Theraputic Bronchoscopy with Lavage  Left Thoracotomy with Left Upper Lobectomy  Mediastinal Lymph Node Dissection     Surgeon: Jaime Beltre M.D.  Assistant: Lauren Estrada CFA  Anesthesia: Sejal Faulkner MD, GETA- double lumen  EBL: 50 mL  Drains: 24 Romanian Straight Left Pleural Chest Tube (Posterior on Skin), 24 Romanian Right Angle Pleural Chest Tube (Anterior on Skin)     This resection was for curative intent.     Specimens:  GALLO Lobectomy ([place] of bronchial margin negative for malignancy  Level 5 LN  Level 6 LN  Level 7 LN  Level 9 LN  Multiple Level 10, Labeled with Location     Operative indications:   Ms. Stack is a 64-year-old female she presented to me with a left upper lobe lung cancer that was biopsy-proven.  This was concerning for chest wall invasion and therefore underwent neoadjuvant radiation and chemotherapy, she had adequate shrinkage of the tumor but still some concern for chest wall invasion.  No evidence of metastatic disease.  She had adequate lung reserve to tolerate anatomic lobectomy.  With this discussed with her proceeding with left thoracotomy possible chest wall resection and left upper lobectomy and mediastinal lymph node dissection.  She understood the risk and benefits and agreed to proceed.     Operative findings     Bronchoscopy:  Normal endobronchial anatomy, no endobronchial tumor noted      Intrathoracic Findings:      Moderately anthracitic lungs     Tumor identified in the lingular segment left upper lobe, there was no chest wall invasion and lung easily fell away from the chest wall     Incomplete major fissure on the left, successful left upper lobectomy    Dense adhesions around the bronchus, with dissection  there was a small rent in the bronchus this was closed with stapler and also reinforced with pledgeted absorbable sutures and BioGlue     Lymph node dissection per above    At end of case therapeutic bronchoscopy with clearance of all bloody secretions from the left lung with evidence of good closure of left upper lobe stump        Operative description in detail:  The patient was taken to the operative suite where she was placed in a supine position. General anesthesia was induced without complication and a double lumen tube. Bronchoscopy was performed with above findings and good positioning of double-lumen tube.     She was positioned in lateral decubitus position with left side up. All pressure points are protected. Single lung ventilation was initiated and double lumen endotracheal tube position confirmed.  She was then prepped and draped in the usual and sterile fashion.       Curvilinear incision was made 1 fingerbreadth below the left scapula.  Dissection carried through skin subcu tissue and the latissimus was divided after marking [place].  I was able to spare the serratus anterior and the thoracic fascia was divided.  I then identified the fifth intercostal space and divided the intercostal fibers after confirming lung was down.  With this easily was able to enter the pleural space and intercostal incision was carried anteriorly and posteriorly.  I then shingled the sixth rib posteriorly.  Retractors were placed.  I inspected and there was no chest wall invasion.  I retracted the lung posteriorly identified level 6 and level 5 lymph nodes these were dissected out fully.  I then encircled the upper lobe vein after identifying the lower lobe vein, this was divided with a vascular load stapler.    I then retracted the upper lobe inferiorly and was able to encircle the first pulmonary arterial branch, this was divided with vascular load stapler.  3 additional branches off the ongoing PA were divided with  vascular load staples.  I then attempted to encircle the bronchus.  There were dense adhesions around this level 10 and 11 lymph nodes were taken during this portion of the dissection.  In order to get a better angle I divided the posterior fissure which was incomplete.  During dissection I did notice a small hole was created in the bronchus of the left upper lobe.  I was eventually able to encircle the upper lobe bronchus I was unable to pass an Endo ZOHREH stapler due to the small space between it.  Given this I passed a TA green load stapler clamped inform test inflation of the lower lobe, there was no air leakage with a water test from the clamped bronchus and the lower lobe inflated well.  Given the stapler was fired and bronchus was divided using a scalpel.  I then filled the chest with water again and performed an air leak test of the bronchial stump, there is no evidence of leaking of air.  I then completed the fissure anteriorly and divided 1 additional pulmonary arterial branch.  Specimen was removed from the chest.     I then inspected the bronchial stump more closely.  I performed diagnostic bronchoscopy and could not see that there was a definite hole in the bronchial stump but there was a large amount of bloody secretions in the left airway.  On external examination of the bronchial stump I was worried about a small rent and therefore reinforced the entire bronchial stump approximately 3 mm lower than the staple line using horizontal mattressed pledgeted Vicryl sutures.  I then applied BioGlue over the suture line.  I allowed the BioGlue to dry and then filled the chest with water again inflated the lung and provide ventilation at 30 mmHg there was no air leaking from the bronchial stump.  I then retracted the lung anteriorly open up the subcarinal space and biopsy level 7 lymph nodes.  Inferior pulmonary ligament was divided and level 9 lymph node was taken.  I then inserted 2 chest tubes 1 straight tube  and 1 right angled tube through the eighth intercostal space.  The lung was allowed to expand in the lower lobe filled the chest well.    The ribs were reapproximated with #1 Vicryl sutures.  The deep thoracic fascia layer was closed with a running 0 Vicryl suture.  The latissimus was reapproximated with 0 Vicryl sutures and then the soft tissues were closed in anatomic layers using absorbable sutures.  Dressings were placed.  All needle sponge instrument counts were correct at the end the case.    Double-lumen ET tube was then exchanged for single-lumen ET tube and I performed therapeutic bronchoscopy with lavage of the left side clearing all bloody secretions.  I examined the bronchial stump and appeared to be intact with good closure.  Patient was awakened from anesthesia explained the operating room transferred the PACU in stable condition.     Complications: None  Disposition: Transfer to PACU extubated and in stable condition.      Jaime Beltre MD  Cardiothoracic Surgeon

## 2025-03-18 NOTE — INTERVAL H&P NOTE
No significant change.  Discussed again the operative plan, risks and benefits; she understands and agrees to proceed.    Jaime Beltre M.D.  Cardiothoracic Surgeon

## 2025-03-19 ENCOUNTER — APPOINTMENT (OUTPATIENT)
Dept: GENERAL RADIOLOGY | Facility: HOSPITAL | Age: 65
DRG: 165 | End: 2025-03-19
Payer: MEDICARE

## 2025-03-19 LAB
ANION GAP SERPL CALCULATED.3IONS-SCNC: 10 MMOL/L (ref 5–15)
BUN SERPL-MCNC: 12 MG/DL (ref 8–23)
BUN/CREAT SERPL: 25 (ref 7–25)
CALCIUM SPEC-SCNC: 8.9 MG/DL (ref 8.6–10.5)
CHLORIDE SERPL-SCNC: 100 MMOL/L (ref 98–107)
CO2 SERPL-SCNC: 24 MMOL/L (ref 22–29)
CREAT SERPL-MCNC: 0.48 MG/DL (ref 0.57–1)
DEPRECATED RDW RBC AUTO: 50.4 FL (ref 37–54)
EGFRCR SERPLBLD CKD-EPI 2021: 105.3 ML/MIN/1.73
ERYTHROCYTE [DISTWIDTH] IN BLOOD BY AUTOMATED COUNT: 15.3 % (ref 12.3–15.4)
GLUCOSE SERPL-MCNC: 114 MG/DL (ref 65–99)
HCT VFR BLD AUTO: 30.7 % (ref 34–46.6)
HGB BLD-MCNC: 10.2 G/DL (ref 12–15.9)
MCH RBC QN AUTO: 30.1 PG (ref 26.6–33)
MCHC RBC AUTO-ENTMCNC: 33.2 G/DL (ref 31.5–35.7)
MCV RBC AUTO: 90.6 FL (ref 79–97)
PLATELET # BLD AUTO: 251 10*3/MM3 (ref 140–450)
PMV BLD AUTO: 9.4 FL (ref 6–12)
POTASSIUM SERPL-SCNC: 4.3 MMOL/L (ref 3.5–5.2)
RBC # BLD AUTO: 3.39 10*6/MM3 (ref 3.77–5.28)
SODIUM SERPL-SCNC: 134 MMOL/L (ref 136–145)
WBC NRBC COR # BLD AUTO: 9.53 10*3/MM3 (ref 3.4–10.8)

## 2025-03-19 PROCEDURE — 94799 UNLISTED PULMONARY SVC/PX: CPT

## 2025-03-19 PROCEDURE — 25010000002 ENOXAPARIN PER 10 MG: Performed by: SURGERY

## 2025-03-19 PROCEDURE — 94664 DEMO&/EVAL PT USE INHALER: CPT

## 2025-03-19 PROCEDURE — 71045 X-RAY EXAM CHEST 1 VIEW: CPT

## 2025-03-19 PROCEDURE — 80048 BASIC METABOLIC PNL TOTAL CA: CPT | Performed by: SURGERY

## 2025-03-19 PROCEDURE — 25010000002 CEFAZOLIN PER 500 MG: Performed by: SURGERY

## 2025-03-19 PROCEDURE — 85027 COMPLETE CBC AUTOMATED: CPT | Performed by: SURGERY

## 2025-03-19 PROCEDURE — 94761 N-INVAS EAR/PLS OXIMETRY MLT: CPT

## 2025-03-19 RX ORDER — OXYCODONE HYDROCHLORIDE 5 MG/1
10 TABLET ORAL EVERY 4 HOURS PRN
Refills: 0 | Status: DISCONTINUED | OUTPATIENT
Start: 2025-03-19 | End: 2025-03-21 | Stop reason: HOSPADM

## 2025-03-19 RX ORDER — METHOCARBAMOL 500 MG/1
500 TABLET, FILM COATED ORAL EVERY 8 HOURS SCHEDULED
Status: DISCONTINUED | OUTPATIENT
Start: 2025-03-19 | End: 2025-03-21 | Stop reason: HOSPADM

## 2025-03-19 RX ORDER — OXYCODONE HYDROCHLORIDE 5 MG/1
5 TABLET ORAL EVERY 4 HOURS PRN
Refills: 0 | Status: DISCONTINUED | OUTPATIENT
Start: 2025-03-19 | End: 2025-03-21 | Stop reason: HOSPADM

## 2025-03-19 RX ADMIN — ACETYLCYSTEINE 1.5 ML: 200 SOLUTION ORAL; RESPIRATORY (INHALATION) at 22:59

## 2025-03-19 RX ADMIN — CHOLECALCIFEROL TAB 10 MCG (400 UNIT) 400 UNITS: 10 TAB at 09:43

## 2025-03-19 RX ADMIN — BUPROPION HYDROCHLORIDE 150 MG: 150 TABLET, FILM COATED, EXTENDED RELEASE ORAL at 21:34

## 2025-03-19 RX ADMIN — IBUPROFEN 600 MG: 600 TABLET ORAL at 13:50

## 2025-03-19 RX ADMIN — POLYETHYLENE GLYCOL 3350 17 G: 17 POWDER, FOR SOLUTION ORAL at 09:43

## 2025-03-19 RX ADMIN — IPRATROPIUM BROMIDE AND ALBUTEROL SULFATE 3 ML: .5; 3 SOLUTION RESPIRATORY (INHALATION) at 06:36

## 2025-03-19 RX ADMIN — ACETAMINOPHEN 650 MG: 325 TABLET, FILM COATED ORAL at 18:17

## 2025-03-19 RX ADMIN — IBUPROFEN 600 MG: 600 TABLET ORAL at 06:08

## 2025-03-19 RX ADMIN — METHOCARBAMOL 500 MG: 500 TABLET, FILM COATED ORAL at 13:50

## 2025-03-19 RX ADMIN — VARENICLINE TARTRATE 1 MG: 0.5 TABLET, FILM COATED ORAL at 09:43

## 2025-03-19 RX ADMIN — ACETAMINOPHEN 650 MG: 325 TABLET, FILM COATED ORAL at 03:36

## 2025-03-19 RX ADMIN — OXYCODONE HYDROCHLORIDE AND ACETAMINOPHEN 250 MG: 500 TABLET ORAL at 09:43

## 2025-03-19 RX ADMIN — ZINC SULFATE 220 MG (50 MG) CAPSULE 220 MG: CAPSULE at 09:43

## 2025-03-19 RX ADMIN — IBUPROFEN 600 MG: 600 TABLET ORAL at 18:17

## 2025-03-19 RX ADMIN — METHOCARBAMOL 500 MG: 500 TABLET, FILM COATED ORAL at 09:43

## 2025-03-19 RX ADMIN — OXYCODONE HYDROCHLORIDE 5 MG: 5 TABLET ORAL at 14:26

## 2025-03-19 RX ADMIN — METHOCARBAMOL 500 MG: 500 TABLET, FILM COATED ORAL at 21:34

## 2025-03-19 RX ADMIN — CEFAZOLIN 2 G: 2 INJECTION, POWDER, FOR SOLUTION INTRAMUSCULAR; INTRAVENOUS at 06:08

## 2025-03-19 RX ADMIN — DOCUSATE SODIUM 100 MG: 100 CAPSULE, LIQUID FILLED ORAL at 09:43

## 2025-03-19 RX ADMIN — IPRATROPIUM BROMIDE AND ALBUTEROL SULFATE 3 ML: .5; 3 SOLUTION RESPIRATORY (INHALATION) at 22:59

## 2025-03-19 RX ADMIN — OXYCODONE HYDROCHLORIDE 5 MG: 5 TABLET ORAL at 19:39

## 2025-03-19 RX ADMIN — ACETYLCYSTEINE 3 ML: 200 SOLUTION ORAL; RESPIRATORY (INHALATION) at 06:37

## 2025-03-19 RX ADMIN — IBUPROFEN 600 MG: 600 TABLET ORAL at 23:26

## 2025-03-19 RX ADMIN — Medication 1 TABLET: at 13:50

## 2025-03-19 RX ADMIN — ENOXAPARIN SODIUM 40 MG: 100 INJECTION SUBCUTANEOUS at 09:43

## 2025-03-19 RX ADMIN — ACETYLCYSTEINE 3 ML: 200 SOLUTION ORAL; RESPIRATORY (INHALATION) at 14:21

## 2025-03-19 RX ADMIN — ACETAMINOPHEN 650 MG: 325 TABLET, FILM COATED ORAL at 09:53

## 2025-03-19 RX ADMIN — IPRATROPIUM BROMIDE AND ALBUTEROL SULFATE 3 ML: .5; 3 SOLUTION RESPIRATORY (INHALATION) at 14:21

## 2025-03-19 NOTE — CASE MANAGEMENT/SOCIAL WORK
Discharge Planning Assessment  [facility]     Patient Name: Deana Stack  MRN: [id number]  Today's Date: 3/19/2025    Admit Date: 3/18/2025        Discharge Needs Assessment       Row Name 03/19/25 0842       Living Environment    People in Home grandchild(dai);spouse    Name(s) of People in Home Se Stack (Spouse)  [phone] (Mobile)    Current Living Arrangements home    Potentially Unsafe Housing Conditions none    In the past 12 months has the electric, gas, oil, or water company threatened to shut off services in your home? No    Primary Care Provided by self    Provides Primary Care For grandchild(dai)    Family Caregiver if Needed spouse    Quality of Family Relationships helpful;involved;supportive    Able to Return to Prior Arrangements yes       Resource/Environmental Concerns    Resource/Environmental Concerns none    Transportation Concerns none       Transportation Needs    In the past 12 months, has lack of transportation kept you from medical appointments or from getting medications? no    In the past 12 months, has lack of transportation kept you from meetings, work, or from getting things needed for daily living? No       Food Insecurity    Within the past 12 months, you worried that your food would run out before you got the money to buy more. Never true    Within the past 12 months, the food you bought just didn't last and you didn't have money to get more. Never true       Transition Planning    Patient/Family Anticipates Transition to home with family    Patient/Family Anticipated Services at Transition none    Transportation Anticipated family or friend will provide       Discharge Needs Assessment    Equipment Currently Used at Home none    Concerns to be Addressed denies needs/concerns at this time    Do you want help finding or keeping work or a job? I do not need or want help    Do you want help with school or training? For example, starting or completing job training or getting a  high school diploma, GED or equivalent No    Anticipated Changes Related to Illness none    Equipment Needed After Discharge none    Discharge Coordination/Progress lives independently at home with  and teen granddaughter.  No DME used or needed. Has established PCP and is denying any current needs. We will be available to assist with any discharge needs.                   Discharge Plan    No documentation.                      Demographic Summary    No documentation.                  Functional Status    No documentation.                  Psychosocial    No documentation.                  Abuse/Neglect    No documentation.                  Legal    No documentation.                  Substance Abuse    No documentation.                  Patient Forms    No documentation.                     Alaina Lainez RN

## 2025-03-19 NOTE — PLAN OF CARE
Goal Outcome Evaluation:  Plan of Care Reviewed With: patient        Progress: improving     A/o x 4. PCA pump in use. Chest tubes dressings C/D/I. no subq emphysema, no leak present. Ambulated in hallway. VSS. Safety maintained.

## 2025-03-19 NOTE — PLAN OF CARE
Goal Outcome Evaluation:  Plan of Care Reviewed With: patient           Outcome Evaluation: pt ao; CT to waterseal; c/o pain; PCA DC'd; ambulating in [place]; safety maintained

## 2025-03-19 NOTE — PROGRESS NOTES
"Patient name: Deana Stack  Patient : 1960  VISIT # [id number]  MR #[id number]    Procedure:Procedure(s):  LEFT THORACOTOMY  LEFT LOBECTOMY WITH CHEST WALL RESECTION  MEDIASTINAL LYMPH NODE DISSECTION  Procedure Date:3/18/2025  POD:1 Day Post-Op    Subjective     On room air.  Left chest tube remains in place to 20 cm suction.  Continues with Dilaudid PCA although patient reports she is not using this often.  Pain is very well-controlled.       Objective     Visit Vitals  /59 (BP Location: Left arm, Patient Position: Lying)   Pulse 76   Temp 98.1 °F (36.7 °C) (Oral)   Resp 18   Ht 158 cm (62.21\")   Wt 63.2 kg (139 lb 5.3 oz)   SpO2 100%   BMI 25.32 kg/m²       Intake/Output Summary (Last 24 hours) at 3/19/2025 0757  Last data filed at 3/19/2025 0749  Gross per 24 hour   Intake 723.3 ml   Output 1750 ml   Net -1026.7 ml     LCT: 200 mL since surgery, serosanguineous, no airleak    Lab:     CBC:  Results from last 7 days   Lab Units 25  0457 25  1534 25  1108   WBC 10*3/mm3 9.53 10.29 3.50   HEMATOCRIT % 30.7* 33.5* 35.3   PLATELETS 10*3/mm3 251 261 236          BMP:  Results from last 7 days   Lab Units 25  0457 25  1534 25  1108   SODIUM mmol/L 134* 140 143   POTASSIUM mmol/L 4.3 4.2 3.2*   CHLORIDE mmol/L 100 105 106   CO2 mmol/L 24.0 23.0 27.0   GLUCOSE mg/dL 114* 153* 80   BUN mg/dL 12 11 7*   CREATININE mg/dL 0.48* 0.61 0.57          COAG:  Results from last 7 days   Lab Units 25  1108   INR  0.96   APTT seconds 26.5       IMAGES:       Imaging Results (Last 24 Hours)       Procedure Component Value Units Date/Time    XR Chest 1 View [[id number]] Collected: 25     Updated: 25    Narrative:      EXAMINATION: XR CHEST 1 VW-     3/19/2025 3:20 AM     HISTORY: post op lung surgery     A frontal projection of the chest is compared with the previous study  dated 3/18/2025.     The lungs are moderately well-expanded.     Mild atelectatic " changes in the lower lungs.     There is no pleural effusion, pulmonary congestion or pneumothorax.     There are postsurgical changes of the left upper chest are similar to  the previous study.     The heart size in the normal range. Atheromatous change of thoracic  aorta are noted.     Left-sided chest tubes are in place. Right subclavian approach Mediport  system in place. No change.     No acute bony abnormality.     Small soft tissue air along the left lateral chest wall and left side of  the neck base is similar to the previous study.       Impression:      1. No change in the previous study 1 day ago. The tubes and lines in  place.              This report was signed and finalized on 3/19/2025 6:33 AM by Dr. Monik Marie MD.       XR Chest 1 View [[id number]] Collected: 03/18/25 1547     Updated: 03/18/25 1553    Narrative:      EXAM: XR CHEST 1 VW-      DATE: 3/18/2025 2:35 PM     HISTORY: post op lung surgery; C34.12-Malignant neoplasm of upper lobe,  left bronchus or lung       COMPARISON: 3/14/2025.     TECHNIQUE:  Frontal view(s) of the chest submitted.     FINDINGS:    Patient is status post left upper lobectomy. There are 2 left chest  tubes and there is left lower neck and lateral chest wall emphysema. No  pneumothorax is seen. No effusion identified. Right lung is grossly  clear. Right subclavian port catheter tip is at the cavoatrial junction.  Heart size is within normal limits. There is calcification of the  thoracic aorta.          Impression:         1. Status post left upper lobectomy and postoperative change with 2 left  chest tubes in place and no visualized pneumothorax.     This report was signed and finalized on 3/18/2025 3:50 PM by Pepe Griffith.             CXR: Left chest tube in stable position with no pneumothorax.  Mild left chest wall subcutaneous emphysema.    Physical Exam:  General: Alert, oriented. No apparent distress.   Cardiovascular: Regular rate and rhythm without  murmur, rubs, or gallops.    Pulmonary: Clear to auscultation bilaterally without wheezing, rubs, or rales.  Chest: Thoracic incisions clean, dry, and intact. Chest tube to 20 cm suction. No air leak. Fluid is serosanguineous  Abdomen: Soft, nondistended, and nontender.  Extremities: Warm, moves all extremities. No edema.   Neurologic:  Grossly intact with no focal deficits.            Impression:  Lung cancer, left upper lobe, concern for chest wall invasion  Current active smoker  COPD        Plan:  Left chest tube placed to waterseal  Discontinue PCA and start as needed Roxicodone and scheduled Robaxin  Labs tomorrow  Routine post thoracic surgery care  Encourage pulmonary toilet and ambulation  Anticipate discharge home in a couple of days as long as pain remains well-controlled.  Discussed with patient and nursing      JAJA Restrepo  03/19/25  07:57 CDT

## 2025-03-19 NOTE — PAYOR COMM NOTE
"ADMIT INPT 3/18/25  [id number]     Deana Call (64 y.o. Female)       Date of Birth   1960    Social Security Number       Address   [address] [facility] [address]    Home Phone   [phone]    MRN   [id number]       Orthodox   Non-Adventism    Marital Status                               Admission Date   3/18/2025    Admission Type   Elective    Admitting Provider   Jaime Beltre MD    Attending Provider   Jaime Beltre MD    Department, Room/Bed   [facility] 3C, 377/1       Discharge Date       Discharge Disposition       Discharge Destination                                 Attending Provider: Jaime Beltre MD    Allergies: No Known Allergies    Isolation: None   Infection: None   Code Status: CPR    Ht: 158 cm (62.21\")   Wt: 63.2 kg (139 lb 5.3 oz)    Admission Cmt: None   Principal Problem: 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 [C34.12]                   Active Insurance as of 3/18/2025       Primary Coverage       Payor Plan Insurance Group Employer/Plan Group    HUMANA MEDICARE REPLACEMENT HUMANA MEDICARE ADVANTAGE PPO [id number]       Payor Plan Address Payor Plan Phone Number Payor Plan Fax Number Effective Dates    [address] [address] [phone]  1/1/2025 - None Entered    [facility] [id number]         Subscriber Name Subscriber Birth Date Member ID       DEANA CALL 1960 [id number]                     Emergency Contacts        (Rel.) Home Phone Work Phone Mobile Phone    eS Call (Spouse) [phone] -- [phone]    Janusz Almazanyla (Grandchild) -- -- [phone]                 History & Physical        Jaime Beltre MD at 03/18/25 1108          No significant change.  Discussed again the operative plan, risks and benefits; she understands and agrees to proceed.    Jaime Beltre M.D.  Cardiothoracic Surgeon        Electronically signed by Jaime Beltre MD at 03/18/25 1107   Source Note: H&P (View-Only)        "       [facility] Cardiothoracic Surgery  PROGRESS NOTE   CC: F/U after Neoadjuvant Radiation and Chemo    Subjective:   History of Present Illness  The patient presents for evaluation of a lung mass.    She reports no pain associated with the lung mass, which was incidentally discovered during a CT scan performed as part of a routine health check. She has not experienced any episodes of coughing. She has expressed concerns regarding the potential impact of the proposed surgical intervention on her quality of life. She has requested a prescription for Chantix to aid in smoking cessation prior to surgery. She has previously attempted to quit smoking using lozenges and patches but found them ineffective and experienced skin irritation with the patches. She is currently employed part-time at a [employer].    She underwent radiation therapy, which she tolerated well, even gaining weight during the process. She did not experience any gastrointestinal disturbances such as nausea or vomiting. She completed her last radiation session on 02/01/2025.    SOCIAL HISTORY  The patient admits to smoking approximately 3 to 3.5 cigarettes a day. She is currently employed part-time at a [employer].      ROS: Cardiovascular ROS: no chest pain or dyspnea on exertion      Objective:        Physical Exam  PE:  Vitals:    03/13/25 1035   BP: 100/62   Pulse: 85   SpO2: 96%     GENERAL: NAD, resting comfortably, normal palor  CARDIOVASCULAR: regular, regular rate, sinus  PULMONARY: Normal bilateral breath sounds, no labored breathing  ABDOMEN: soft, nt/nd  EXTREMITIES: mild peripheral edema, normal pulses, normal ROM        Lab Results (last 72 hours)       ** No results found for the last 72 hours. **              Results  I personally reviewed CT scan of the chest the following is my interpretation:  Anterior left upper lobe mass smaller in size with appropriate treatment response after radiation, this has shrunk  but towards the chest wall and still involves chest wall.  No evidence of new metastatic disease and no significant mediastinal adenopathy.    Assessment & Plan    Assessment & Plan    Ms. Stack is a 64-year-old female she follows up with me after having neoadjuvant therapy for an adenocarcinoma left upper lobe that likely involves left chest wall.  She has had positive treatment response with much smaller appearing mass but it still does appear to abut and possibly involve the left anterior chest wall.  She continues to smoke but is trying hard to quit.  There is no evidence of metastatic disease on repeat imaging.    Discussed with the patient and her  today the natural history of lung cancer and patient such as her.  I would recommend surgical resection including chest wall resection to obtain adequate margin.  We discussed this occurring through open thoracotomy in order to allow adequate chest wall resection.  I discussed with her operative expectations of this as well as risk and benefits, she understands and wants to proceed. We discussed the risk and benefits of surgery and alternatives including but not limited to bleeding, infection, injury to major vessels or organs, need for transfusion, need for conversion to open operation, pneumonia, prolonged airleak, risk of anesthesia, and/or death.  She understands these risk and agrees to proceed.    I have reviewed her PFT she has adequate lung reserve to tolerate anatomic lobectomy.  We will plan on surgery next week.  Thank you for trusting me with the care of Ms. Stack.  Please not hesitate to call questions or concerns.        Jaime Beltre MD   Cardiothoracic Surgeon    Patient or patient representative verbalized consent for the use of Ambient Listening during the visit with  Jaime Beltre MD for chart documentation. 3/18/2025  11:08 CDT    Electronically signed by Jaime Beltre MD at 03/18/25 7442                 Jaime Beltre MD at  03/13/25 1030              [facility] Cardiothoracic Surgery  PROGRESS NOTE   CC: F/U after Neoadjuvant Radiation and Chemo    Subjective:   History of Present Illness  The patient presents for evaluation of a lung mass.    She reports no pain associated with the lung mass, which was incidentally discovered during a CT scan performed as part of a routine health check. She has not experienced any episodes of coughing. She has expressed concerns regarding the potential impact of the proposed surgical intervention on her quality of life. She has requested a prescription for Chantix to aid in smoking cessation prior to surgery. She has previously attempted to quit smoking using lozenges and patches but found them ineffective and experienced skin irritation with the patches. She is currently employed part-time at a [employer].    She underwent radiation therapy, which she tolerated well, even gaining weight during the process. She did not experience any gastrointestinal disturbances such as nausea or vomiting. She completed her last radiation session on 02/01/2025.    SOCIAL HISTORY  The patient admits to smoking approximately 3 to 3.5 cigarettes a day. She is currently employed part-time at a [employer] company.      ROS: Cardiovascular ROS: no chest pain or dyspnea on exertion      Objective:        Physical Exam  PE:  Vitals:    03/13/25 1035   BP: 100/62   Pulse: 85   SpO2: 96%     GENERAL: NAD, resting comfortably, normal palor  CARDIOVASCULAR: regular, regular rate, sinus  PULMONARY: Normal bilateral breath sounds, no labored breathing  ABDOMEN: soft, nt/nd  EXTREMITIES: mild peripheral edema, normal pulses, normal ROM        Lab Results (last 72 hours)       ** No results found for the last 72 hours. **              Results  I personally reviewed CT scan of the chest the following is my interpretation:  Anterior left upper lobe mass smaller in size with appropriate treatment response after  radiation, this has shrunk but towards the chest wall and still involves chest wall.  No evidence of new metastatic disease and no significant mediastinal adenopathy.    Assessment & Plan    Assessment & Plan    Ms. Stack is a 64-year-old female she follows up with me after having neoadjuvant therapy for an adenocarcinoma left upper lobe that likely involves left chest wall.  She has had positive treatment response with much smaller appearing mass but it still does appear to abut and possibly involve the left anterior chest wall.  She continues to smoke but is trying hard to quit.  There is no evidence of metastatic disease on repeat imaging.    Discussed with the patient and her  today the natural history of lung cancer and patient such as her.  I would recommend surgical resection including chest wall resection to obtain adequate margin.  We discussed this occurring through open thoracotomy in order to allow adequate chest wall resection.  I discussed with her operative expectations of this as well as risk and benefits, she understands and wants to proceed. We discussed the risk and benefits of surgery and alternatives including but not limited to bleeding, infection, injury to major vessels or organs, need for transfusion, need for conversion to open operation, pneumonia, prolonged airleak, risk of anesthesia, and/or death.  She understands these risk and agrees to proceed.    I have reviewed her PFT she has adequate lung reserve to tolerate anatomic lobectomy.  We will plan on surgery next week.  Thank you for trusting me with the care of Ms. Stack.  Please not hesitate to call questions or concerns.        Jaime Beltre MD   Cardiothoracic Surgeon    Patient or patient representative verbalized consent for the use of Ambient Listening during the visit with  Jaime Beltre MD for chart documentation. 3/18/2025  11:08 CDT    Electronically signed by Jaime Beltre MD at 03/18/25 9624        Facility-Administered Medications as of 3/18/2025   Medication Dose Route Frequency Provider Last Rate Last Admin    acetaminophen (TYLENOL) tablet 650 mg  650 mg Oral Q8H Alexsander, Jaime BURNS MD   650 mg at 03/18/25 1757    Or    acetaminophen (TYLENOL) 160 MG/5ML oral solution 650 mg  650 mg Oral Q8H Alexsander, Jaime BURNS MD        Or    acetaminophen (TYLENOL) suppository 650 mg  650 mg Rectal Q8H Alexsander, Jaime BURNS MD        [COMPLETED] acetaminophen (TYLENOL) tablet 1,000 mg  1,000 mg Oral Once Sejal Faulkner MD   1,000 mg at 03/18/25 1035    acetylcysteine (MUCOMYST) 20 % nebulizer solution 3 mL  3 mL Nebulization Q8H - RT Alexsander, Jaime BURNS MD        [START ON 3/19/2025] ascorbic acid (VITAMIN C) tablet 250 mg  250 mg Oral Daily Beltre, Jaime BURNS MD        bisacodyl (DULCOLAX) suppository 10 mg  10 mg Rectal Daily PRN Alexsander, Jaime BURNS MD        buPROPion SR (WELLBUTRIN SR) 12 hr tablet 150 mg  150 mg Oral Q24H Beltre, Jaime BURNS MD        ceFAZolin 2000 mg IVPB in 100 mL NS (MBP)  2 g Intravenous Q8H Beltre, Jaime BURNS MD        [START ON 3/19/2025] cholecalciferol (VITAMIN D3) tablet 400 Units  400 Units Oral Daily Beltre, Jaime BURNS MD        [COMPLETED] dexAMETHasone (DECADRON) injection 4 mg  4 mg Intravenous Once PRN Sejal Faulkner MD   4 mg at 03/18/25 1035    docusate sodium (COLACE) capsule 100 mg  100 mg Oral Daily Beltre, Jaime BURNS MD   100 mg at 03/18/25 1738    [START ON 3/19/2025] enoxaparin sodium (LOVENOX) syringe 40 mg  40 mg Subcutaneous Daily Beltre, Jaime BURNS MD        [COMPLETED] fentaNYL citrate (PF) (SUBLIMAZE) injection 50 mcg  50 mcg Intravenous Q10 Min PRN Sejal Faulkner MD   50 mcg at 03/18/25 1609    [COMPLETED] heparin (porcine) 5000 UNIT/ML injection 5,000 Units  5,000 Units Subcutaneous Once Renate Tavares APRN   5,000 Units at 03/18/25 1033    HYDROmorphone (DILAUDID) PCA 0.2 mg/mL 30 mL syringe   Intravenous Continuous Beltre, Jaime BURNS MD   New Bag at 03/18/25 5630     hydrOXYzine (ATARAX) tablet 10 mg  10 mg Oral TID PRN Beltre, Jaime BURNS MD        ibuprofen (ADVIL,MOTRIN) tablet 600 mg  600 mg Oral Q6H Beltre, Jaime BURNS MD   600 mg at 25 1859    ipratropium-albuterol (DUO-NEB) nebulizer solution 3 mL  3 mL Nebulization Q8H - RT Beltre, Jaime BURNS MD        lactated ringers infusion  40 mL/hr Intravenous Continuous Beltre, Jaime BURNS MD 40 mL/hr at 25 1737 40 mL/hr at 25 1737    multivitamin with minerals 1 tablet  1 tablet Oral Daily Beltre, Jaime BURNS MD   1 tablet at 25 1738    naloxone (NARCAN) injection 0.1 mg  0.1 mg Intravenous Q5 Min PRN Beltre, Jaime BURNS MD        ondansetron ODT (ZOFRAN-ODT) disintegrating tablet 4 mg  4 mg Oral Q6H PRN Beltre, Jaime BURNS MD        Or    ondansetron (ZOFRAN) injection 4 mg  4 mg Intravenous Q6H PRN Beltre, Jaime BURNS MD        Pharmacy Consult   Not Applicable Continuous PRN Beltre, Jaime BURNS MD        polyethylene glycol (MIRALAX) packet 17 g  17 g Oral Daily Beltre, Jaime BURNS MD   17 g at 25 1738    [] sodium chloride 0.9 % infusion  20 mL/hr Intravenous Continuous PRN Beltre, Jaime BURNS MD        [START ON 3/19/2025] varenicline (CHANTIX) tablet 1 mg  1 mg Oral Daily Beltre, Jaime BURNS MD        [START ON 3/19/2025] zinc sulfate (ZINCATE) capsule 220 mg  220 mg Oral Daily Beltre, Jaime BURNS MD         Orders (all)        Start     Ordered    25 0900  cholecalciferol (VITAMIN D3) tablet 400 Units  Daily         25 1504    25 0900  varenicline (CHANTIX) tablet 1 mg  Daily         25 1504    25 0900  ascorbic acid (VITAMIN C) tablet 250 mg  Daily         25 1504    25 0900  zinc sulfate (ZINCATE) capsule 220 mg  Daily         25 1504    25 0900  enoxaparin sodium (LOVENOX) syringe 40 mg  Daily         25 1659    25 0600  Basic Metabolic Panel  Morning Draw         25 1659    25 0600  CBC (No Diff)  Morning Draw         25 1659    25 0400  XR  "Chest 1 View  Daily       03/18/25 1659    03/18/25 2300  ceFAZolin 2000 mg IVPB in 100 mL NS (MBP)  Every 8 Hours         03/18/25 1659    03/18/25 2100  buPROPion SR (WELLBUTRIN SR) 12 hr tablet 150 mg  Every 24 Hours         03/18/25 1504    03/18/25 1830  acetaminophen (TYLENOL) tablet 650 mg  Every 8 Hours        Placed in \"Or\" Linked Group    03/18/25 1659    03/18/25 1830  acetaminophen (TYLENOL) 160 MG/5ML oral solution 650 mg  Every 8 Hours        Placed in \"Or\" Linked Group    03/18/25 1659    03/18/25 1830  acetaminophen (TYLENOL) suppository 650 mg  Every 8 Hours        Placed in \"Or\" Linked Group    03/18/25 1659    03/18/25 1800  Ambulate patient in AM  3 Times Daily         03/18/25 1659 03/18/25 1800  Incentive Spirometry  Every 2 Hours While Awake       03/18/25 1659 03/18/25 1800  Clear & Record PCA Settings  2x Daily PCA       03/18/25 1504    03/18/25 1800  ibuprofen (ADVIL,MOTRIN) tablet 600 mg  Every 6 Hours Scheduled         03/18/25 1504    03/18/25 1745  lactated ringers infusion  Continuous         03/18/25 1659 03/18/25 1745  ipratropium-albuterol (DUO-NEB) nebulizer solution 3 mL  Every 8 Hours - RT         03/18/25 1659    03/18/25 1745  acetylcysteine (MUCOMYST) 20 % nebulizer solution 3 mL  Every 8 Hours - RT         03/18/25 1659 03/18/25 1745  docusate sodium (COLACE) capsule 100 mg  Daily         03/18/25 1659 03/18/25 1745  polyethylene glycol (MIRALAX) packet 17 g  Daily         03/18/25 1659    03/18/25 1730  multivitamin with minerals 1 tablet  Daily         03/18/25 1504    03/18/25 1730  HYDROmorphone (DILAUDID) PCA 0.2 mg/mL 30 mL syringe  Continuous         03/18/25 1504    03/18/25 1709  Diet: Liquid; Clear Liquid; Fluid Consistency: Thin (IDDSI 0)  Diet Effective Now         03/18/25 1708    03/18/25 1701  Inpatient Admission  Once         03/18/25 1700    03/18/25 1701  Insert Indwelling Urinary Catheter  Once        Comments: Follow Protocol As Outlined in " Process Instructions (Open Order Report to View Full Instructions)    03/18/25 1700    03/18/25 1700  Code Status and Medical Interventions: CPR (Attempt to Resuscitate); Full Support  Continuous         03/18/25 1659 03/18/25 1700  Notify Physician (Standard Adult Parameters)  Until Discontinued         03/18/25 1659 03/18/25 1700  Notify Provider - Chest Tube Output Greater Than 100 mL/hr  Until Discontinued         03/18/25 1659 03/18/25 1700  Vital Signs every 1 hour x4, then every 2 hours x2, then every 4 hours.  Every Hour       03/18/25 1659 03/18/25 1700  Continuous Cardiac Monitoring  Continuous        Comments: Follow Standing Orders As Outlined in Process Instructions (Open Order Report to View Full Instructions)    03/18/25 1659 03/18/25 1700  Maintain IV Access  Continuous         03/18/25 1659 03/18/25 1700  Telemetry - Place Orders & Notify Provider of Results When Patient Experiences Acute Chest Pain, Dysrhythmia or Respiratory Distress  Continuous        Comments: Open Order Report to View Parameters Requiring Provider Notification    03/18/25 1659 03/18/25 1700  May Be Off Telemetry for Tests  Continuous         03/18/25 1659 03/18/25 1700  Elevate HOB  Continuous         03/18/25 1659 03/18/25 1700  I&O Every 4 Hours For the First 24 Hours, Then Every Shift  Every Shift         03/18/25 1659 03/18/25 1700  Chest Tube Dressing Change Daily  Daily      Comments: Dry Gauze & Tape    03/18/25 1659 03/18/25 1700  Remove incision line dressing.  Once        Comments: POD1    03/18/25 1659 03/18/25 1700  Advance Diet As Tolerated -  Until Discontinued         03/18/25 1659 03/18/25 1700  Continuous Pulse Oximetry  Continuous         03/18/25 1659 03/18/25 1700  NPO Diet NPO Type: Sips with Meds  Diet Effective Now,   Status:  Canceled         03/18/25 1659 03/18/25 1659  ondansetron ODT (ZOFRAN-ODT) disintegrating tablet 4 mg  Every 6 Hours PRN        Placed  "in \"Or\" Linked Group    03/18/25 1659    03/18/25 1659  ondansetron (ZOFRAN) injection 4 mg  Every 6 Hours PRN        Placed in \"Or\" Linked Group    03/18/25 1659    03/18/25 1659  bisacodyl (DULCOLAX) suppository 10 mg  Daily PRN         03/18/25 1659    03/18/25 1534  Oxygen Therapy- Nasal Cannula; Titrate 1-6 LPM Per SpO2; 90 - 95%  Continuous         03/18/25 1533    03/18/25 1534  Continuous Pulse Oximetry  Continuous,   Status:  Canceled         03/18/25 1533    03/18/25 1534  Assess Need for Indwelling Urinary Catheter - Follow Removal Protocol  Continuous        Comments: Follow Protocol As Outlined in Process Instructions (Open Order Report to View Full Instructions)    03/18/25 1533    03/18/25 1534  Urinary Catheter Care  Every Shift       03/18/25 1533    03/18/25 1528  POC Glucose STAT  STAT        Comments: Post op Glucose Check on All Diabetic Patients, Notify Anesthesia if Blood Sugar is Less Than 80 mg/dL or Greater Than 250mg/dL      03/18/25 1527    03/18/25 1528  Vital signs every 5 minutes for 15 minutes, every 15 minutes thereafter.  Once         03/18/25 1527    03/18/25 1528  Call Anesthesiologist for additional IV Fluid bolus for Hypotension/Tachycardia  Continuous         03/18/25 1527    03/18/25 1528  Notify Anesthesia of Any Acute Changes in Patient Condition  Until Discontinued         03/18/25 1527    03/18/25 1528  Notify Anesthesia for Unrelieved Pain  Until Discontinued         03/18/25 1527    03/18/25 1528  Once DC criteria to floor met, follow surgeon's orders.  Until Discontinued         03/18/25 1527    03/18/25 1528  Discharge patient from PACU when discharge criteria is met.  Until Discontinued         03/18/25 1527    03/18/25 1528  Basic Metabolic Panel  STAT         03/18/25 1527    03/18/25 1528  CBC (No Diff)  STAT         03/18/25 1527    03/18/25 1528  XR Chest 1 View  1 Time Imaging        Comments: Please call surgeon with results of CXR.    03/18/25 1527    03/18/25 " 1527  Apply warming blanket  As Needed,   Status:  Canceled      Comments: For a recorded temp of <36.9 C    03/18/25 1527    03/18/25 1527  ibuprofen (ADVIL,MOTRIN) tablet 600 mg  Every 6 Hours PRN,   Status:  Discontinued         03/18/25 1527    03/18/25 1527  oxyCODONE-acetaminophen (PERCOCET)  MG per tablet 1 tablet  Every 4 Hours PRN,   Status:  Discontinued         03/18/25 1527    03/18/25 1527  HYDROmorphone (DILAUDID) injection 0.5 mg  Every 10 Minutes PRN,   Status:  Discontinued         03/18/25 1527    03/18/25 1527  fentaNYL citrate (PF) (SUBLIMAZE) injection 50 mcg  Every 10 Minutes PRN         03/18/25 1527    03/18/25 1527  naloxone (NARCAN) injection 0.04 mg  As Needed,   Status:  Discontinued         03/18/25 1527    03/18/25 1527  flumazenil (ROMAZICON) injection 0.2 mg  As Needed,   Status:  Discontinued         03/18/25 1527    03/18/25 1527  ondansetron (ZOFRAN) injection 4 mg  Every 15 Minutes PRN,   Status:  Discontinued         03/18/25 1527    03/18/25 1527  labetalol (NORMODYNE,TRANDATE) injection 5 mg  Every 5 Minutes PRN,   Status:  Discontinued         03/18/25 1527    03/18/25 1527  atropine sulfate injection 0.5 mg  Once As Needed,   Status:  Discontinued         03/18/25 1527    03/18/25 1503  Assess Respiratory Rate, Pain Score & Sedation Level Using Pasero Opioid-Sedation Scale (POSS)  Per Order Details        Comments: Assess Every 2 Hours x24 Hours, Then Every 4 Hours & PRN While Receiving PCA.    03/18/25 1504    03/18/25 1503  Notify Provider for Inadequate Pain Control, Excessive Sedation or Other Issues Regarding PCA Therapy  Continuous        Comments: Open Order Report to View Parameters Requiring Provider Notification    03/18/25 1504    03/18/25 1503  Use a Dedicated Line for PCA Infusion  Continuous         03/18/25 1504    03/18/25 1502  Pharmacy Consult  Continuous PRN         03/18/25 1504    03/18/25 1502  naloxone (NARCAN) injection 0.1 mg  Every 5 Minutes  PRN         03/18/25 1504    03/18/25 1502  sodium chloride 0.9 % infusion  Continuous PRN         03/18/25 1504    03/18/25 1502  Inpatient Admission  Once         03/18/25 1504    03/18/25 1501  hydrOXYzine (ATARAX) tablet 10 mg  3 Times Daily PRN         03/18/25 1504    03/18/25 1238  bupivacaine-EPINEPHrine PF 20 mL, bupivacaine liposome 20 mL mixture  As Needed,   Status:  Discontinued         03/18/25 1238    03/18/25 1237  sterile water irrigation solution  As Needed,   Status:  Discontinued         03/18/25 1237    03/18/25 1202  Tissue Pathology Exam  RELEASE UPON ORDERING         03/18/25 1202    03/18/25 1035  sodium chloride 0.9 % flush 3 mL  Every 12 Hours Scheduled,   Status:  Discontinued         03/18/25 1033    03/18/25 1035  lactated ringers infusion  Continuous,   Status:  Discontinued         03/18/25 1033    03/18/25 1035  acetaminophen (TYLENOL) tablet 1,000 mg  Once         03/18/25 1033    03/18/25 1033  dexAMETHasone (DECADRON) injection 4 mg  Once As Needed         03/18/25 1033    03/18/25 1033  Oxygen Therapy- Nasal Cannula; Titrate 1-6 LPM Per SpO2; 90 - 95%  Continuous,   Status:  Canceled         03/18/25 1033    03/18/25 1033  Continuous Pulse Oximetry  Continuous,   Status:  Canceled         03/18/25 1033    03/18/25 1033  Insert Peripheral IV  Once         03/18/25 1033    03/18/25 1033  Saline Lock & Maintain IV Access  Continuous,   Status:  Canceled         03/18/25 1033    03/18/25 1033  Oxygen Therapy- Nasal Cannula; Titrate 1-6 LPM Per SpO2; 90 - 95%  Continuous,   Status:  Canceled         03/18/25 1033    03/18/25 1032  Vital Signs - Per Anesthesia Protocol  As Needed,   Status:  Canceled       03/18/25 1033    03/18/25 1032  POC Glucose PRN  As Needed,   Status:  Canceled      Comments: Notify Anesthesiologist if Blood Sugar Greater Than 200      03/18/25 1033    03/18/25 1032  sodium chloride 0.9 % flush 3-10 mL  As Needed,   Status:  Discontinued         03/18/25 1030     03/18/25 1032  sodium chloride 0.9 % infusion 40 mL  As Needed,   Status:  Discontinued         03/18/25 1033 03/18/25 1032  Midazolam HCl (PF) (VERSED) injection 2 mg  Every 10 Minutes PRN,   Status:  Discontinued         03/18/25 1033 03/18/25 0944  lactated ringers infusion 1,000 mL  Continuous,   Status:  Discontinued         03/18/25 0942 03/18/25 0944  lactated ringers infusion 1,000 mL  Continuous,   Status:  Discontinued         03/18/25 0942 03/18/25 0943  Type & Screen  STAT         03/18/25 0942 03/18/25 0940  ceFAZolin 2000 mg IVPB in 100 mL NS (MBP)  Once,   Status:  Discontinued         03/18/25 0938 03/18/25 0940  sodium chloride 0.9 % flush 10 mL  Every 12 Hours Scheduled,   Status:  Discontinued         03/18/25 0938 03/18/25 0940  heparin (porcine) 5000 UNIT/ML injection 5,000 Units  Once         03/18/25 0938 03/18/25 0940  Please Insert a Second Peripheral IV If Indicated For Procedure (All DaVinci Cases, Gastric Bypass, Sleeve Surgery, AAA, Any Vascular Bypass, Carotid, Sigmoidectomy, Colectomy (Lap Assisted), Colon Resection, Nissen, Exploratory Laparotomy, Spinal...  Once        Comments: Please Insert a Second Peripheral IV If Indicated For Procedure (All DaVinci Cases, Gastric Bypass, Sleeve Surgery, AAA, Any Vascular Bypass, Carotid, Sigmoidectomy, Colectomy (Lap Assisted), Colon Resection, Nissen, Exploratory Laparotomy, Spinal Fusion, Craniotomy, Thoracotomy, CABG, TAVR, Valve Surgery or Mediastinoscopy, Femoral Endarterectomy, Carotid Endarterectomy, Anterior Lumbar Exposure, or all Aneurysm Repairs    03/18/25 0942 03/18/25 0940  Insert Peripheral IV  Once         03/18/25 0942 03/18/25 0940  Maintain IV Access  Continuous,   Status:  Canceled         03/18/25 0942 03/18/25 0939  sodium chloride 0.9 % flush 10 mL  As Needed,   Status:  Discontinued         03/18/25 0942 03/18/25 0939  Follow Anesthesia Guidelines / Protocol  Once         03/18/25 0938     03/18/25 0939  Verify / Perform Chlorhexidine Skin Prep  Once        Comments: Chlorhexidine Skin Wipes and Instructions For All Patients Having A Procedure Requiring an Outward Incision if Not Allergic.  If Allergic, Give Antibacterial Skin Wipes and Instructions.  Do Not Use For Facial Cases or on Any Mucus Membranes.    03/18/25 0938 03/18/25 0939  SCD (Sequential Compression Device) - Place on Patient in Pre-Op  Once         03/18/25 0938 03/18/25 0939  Insert Peripheral IV x2  Once         03/18/25 0938 03/18/25 0939  Saline Lock & Maintain IV Access  Continuous,   Status:  Canceled         03/18/25 0938 03/18/25 0939  Follow Anesthesia Guidelines / Protocol  Once         03/18/25 0942 03/18/25 0939  Insert Peripheral IV  Once         03/18/25 0942 03/18/25 0939  Maintain IV Access  Continuous,   Status:  Canceled         03/18/25 0942 03/18/25 0938  lidocaine PF 1% (XYLOCAINE) injection 0.5 mL  Once As Needed,   Status:  Discontinued         03/18/25 0942 03/18/25 0938  sodium chloride 0.9 % flush 3 mL  As Needed,   Status:  Discontinued         03/18/25 0942 03/18/25 0938  sodium chloride 0.9 % flush 10 mL  As Needed,   Status:  Discontinued         03/18/25 0938 03/18/25 0938  sodium chloride 0.9 % infusion 40 mL  As Needed,   Status:  Discontinued         03/18/25 0938 03/17/25 0000  varenicline (CHANTIX) 1 MG tablet  Daily         03/18/25 1005    Unscheduled  Up in Chair tonight if hemodynamically stable and for meals.  As Needed       03/18/25 1659    Unscheduled  Oxygen Therapy- Nasal Cannula; Titrate 1-6 LPM Per SpO2; 90 - 95%  Continuous PRN       03/18/25 1659                     Operative/Procedure Notes (all)        Jaime Beltre MD at 03/18/25 1150  Version 1 of 1         Cardiothoracic Surgery Operative Note     Date of Procedure: 3/18/2025     Pre-op diagnosis: Lung Cancer, Left Upper Lobe, Concern for Chest Wall Invasion  Current Active Smoker  COPD  GERD      Post-op diagnosis: Same with Without Chest Wall Invasion     Procedure:  Theraputic Bronchoscopy with Lavage  Left Thoracotomy with Left Upper Lobectomy  Mediastinal Lymph Node Dissection     Surgeon: Jaime Beltre M.D.  Assistant: Lauren Estrada CFA  Anesthesia: Sejal Faulkner MD, GETA- double lumen  EBL: 50 mL  Drains: 24 Mongolian Straight Left Pleural Chest Tube (Posterior on Skin), 24 Mongolian Right Angle Pleural Chest Tube (Anterior on Skin)     This resection was for curative intent.     Specimens:  GALLO Lobectomy (Frozen of bronchial margin negative for malignancy  Level 5 LN  Level 6 LN  Level 7 LN  Level 9 LN  Multiple Level 10, Labeled with Location     Operative indications:   Ms. Stack is a 64-year-old female she presented to me with a left upper lobe lung cancer that was biopsy-proven.  This was concerning for chest wall invasion and therefore underwent neoadjuvant radiation and chemotherapy, she had adequate shrinkage of the tumor but still some concern for chest wall invasion.  No evidence of metastatic disease.  She had adequate lung reserve to tolerate anatomic lobectomy.  With this discussed with her proceeding with left thoracotomy possible chest wall resection and left upper lobectomy and mediastinal lymph node dissection.  She understood the risk and benefits and agreed to proceed.     Operative findings     Bronchoscopy:  Normal endobronchial anatomy, no endobronchial tumor noted      Intrathoracic Findings:      Moderately anthracitic lungs     Tumor identified in the lingular segment left upper lobe, there was no chest wall invasion and lung easily fell away from the chest wall     Incomplete major fissure on the left, successful left upper lobectomy    Dense adhesions around the bronchus, with dissection there was a small rent in the bronchus this was closed with stapler and also reinforced with pledgeted absorbable sutures and BioGlue     Lymph node dissection per above    At end of case  therapeutic bronchoscopy with clearance of all bloody secretions from the left lung with evidence of good closure of left upper lobe stump        Operative description in detail:  The patient was taken to the operative suite where she was placed in a supine position. General anesthesia was induced without complication and a double lumen tube. Bronchoscopy was performed with above findings and good positioning of double-lumen tube.     She was positioned in lateral decubitus position with left side up. All pressure points are protected. Single lung ventilation was initiated and double lumen endotracheal tube position confirmed.  She was then prepped and draped in the usual and sterile fashion.       Curvilinear incision was made 1 fingerbreadth below the left scapula.  Dissection carried through skin subcu tissue and the latissimus was divided after marking [place].  I was able to spare the serratus anterior and the thoracic fascia was divided.  I then identified the fifth intercostal space and divided the intercostal fibers after confirming lung was down.  With this easily was able to enter the pleural space and intercostal incision was carried anteriorly and posteriorly.  I then shingled the sixth rib posteriorly.  Retractors were placed.  I inspected and there was no chest wall invasion.  I retracted the lung posteriorly identified level 6 and level 5 lymph nodes these were dissected out fully.  I then encircled the upper lobe vein after identifying the lower lobe vein, this was divided with a vascular load stapler.    I then retracted the upper lobe inferiorly and was able to encircle the first pulmonary arterial branch, this was divided with vascular load stapler.  3 additional branches off the ongoing PA were divided with vascular load staples.  I then attempted to encircle the bronchus.  There were dense adhesions around this level 10 and 11 lymph nodes were taken during this portion of the dissection.   In order to get a better angle I divided the posterior fissure which was incomplete.  During dissection I did notice a small hole was created in the bronchus of the left upper lobe.  I was eventually able to encircle the upper lobe bronchus I was unable to pass an Endo ZOHREH stapler due to the small space between it.  Given this I passed a TA green load stapler clamped inform test inflation of the lower lobe, there was no air leakage with a water test from the clamped bronchus and the lower lobe inflated well.  Given the stapler was fired and bronchus was divided using a scalpel.  I then filled the chest with water again and performed an air leak test of the bronchial stump, there is no evidence of leaking of air.  I then completed the fissure anteriorly and divided 1 additional pulmonary arterial branch.  Specimen was removed from the chest.     I then inspected the bronchial stump more closely.  I performed diagnostic bronchoscopy and could not see that there was a definite hole in the bronchial stump but there was a large amount of bloody secretions in the left airway.  On external examination of the bronchial stump I was worried about a small rent and therefore reinforced the entire bronchial stump approximately 3 mm lower than the staple line using horizontal mattressed pledgeted Vicryl sutures.  I then applied BioGlue over the suture line.  I allowed the BioGlue to dry and then filled the chest with water again inflated the lung and provide ventilation at 30 mmHg there was no air leaking from the bronchial stump.  I then retracted the lung anteriorly open up the subcarinal space and biopsy level 7 lymph nodes.  Inferior pulmonary ligament was divided and level 9 lymph node was taken.  I then inserted 2 chest tubes 1 straight tube and 1 right angled tube through the eighth intercostal space.  The lung was allowed to expand in the lower lobe filled the chest well.    The ribs were reapproximated with #1 Vicryl  sutures.  The deep thoracic fascia layer was closed with a running 0 Vicryl suture.  The latissimus was reapproximated with 0 Vicryl sutures and then the soft tissues were closed in anatomic layers using absorbable sutures.  Dressings were placed.  All needle sponge instrument counts were correct at the end the case.    Double-lumen ET tube was then exchanged for single-lumen ET tube and I performed therapeutic bronchoscopy with lavage of the left side clearing all bloody secretions.  I examined the bronchial stump and appeared to be intact with good closure.  Patient was awakened from anesthesia explained the operating room transferred the PACU in stable condition.     Complications: None  Disposition: Transfer to PACU extubated and in stable condition.      Jaime Beltre MD  Cardiothoracic Surgeon    Electronically signed by Jaime Beltre MD at 03/18/25 7896

## 2025-03-19 NOTE — DISCHARGE SUMMARY
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 Beltre on 3/18/2025    HPI:  Ms. Deana Stack is a 64 y.o. female who presented to Dr. Beltre with a left upper lobe lung cancer that was biopsy-proven.  This was concerning for chest wall invasion and therefore underwent neoadjuvant radiation and chemotherapy.  She had adequate decrease in size of tumor but still with concern for chest wall invasion.  No evidence of metastatic disease.  She had adequate lung reserve to tolerate lobectomy.  Dr. Beltre discussed with her the risk and benefits of proceeding with left thoracotomy and possible chest wall resection with left upper lobectomy and mediastinal lymph node dissection.  She understood and agreed to proceed.    Hospital Course: On 3/18/2025, Ms. Stack was taken to the operating room for bronchoscopy with left thoracotomy with left upper lobectomy and mediastinal lymph node dissection.  See op note by Dr. Beltre detailing the operation.  Following surgery she was transferred to the PACU in stable condition and ultimately transferred to  for ongoing recovery.  The remainder of her hospital stay was significant for chest tube management and pain control.  She is eating well.  She is ambulating without issue.  The left chest tubes were removed without remark on postop day 3.  Follow up imaging revealed stability.  She has adequate pain control.  She meets criteria for discharge home on postop day 3.  The patient is agreeable to this plan.    Final pathology is  pending at the time of discharge and will be discussed at her postop follow-up visit.  Routine follow-up with me in 1 week with repeat chest x-ray.    Condition on Discharge:  Neurologically intact and has good pain control.  She is eating well. All thoracic incisions are healing nicely without evidence of thoracic hernia.  The heart is in normal sinus rhythm.         Discharge Disposition:  Home or Self Care [1]    Discharge Medications:     Discharge Medications        New Medications        Instructions Start Date   HYDROcodone-acetaminophen 5-325 MG per tablet  Commonly known as: NORCO   1 tablet, Oral, Every 6 Hours PRN             Continue These Medications        Instructions Start Date   buPROPion  MG 12 hr tablet  Commonly known as: WELLBUTRIN SR   150 mg, Oral, Every 24 Hours      cholecalciferol 10 MCG (400 UNIT) tablet  Commonly known as: VITAMIN D3   400 Units, Oral, Daily      esomeprazole 20 MG capsule  Commonly known as: nexIUM   20 mg, Oral, Every Morning Before Breakfast      hydrOXYzine 10 MG tablet  Commonly known as: ATARAX   10 mg, Oral, 3 Times Daily PRN      multivitamin with minerals tablet tablet   1 tablet, Oral, Daily      varenicline 1 MG tablet  Commonly known as: CHANTIX   1 mg, Oral, Daily      vitamin C 250 MG tablet  Commonly known as: ASCORBIC ACID   250 mg, Oral, Daily      zinc sulfate 220 (50 Zn) MG capsule  Commonly known as: ZINCATE   220 mg, Oral, Daily               Discharge Diet: Regular diet     Discharge Care Plan / Instructions:   Keep incisions clean and open to air.  May wash with soap and water.  Chest tube sites with dressings to keep on for 48 hours.  Ok to reapply dressing as needed after removal.      Activity at Discharge:   No heavy lifting greater than 10 pounds and refrain from driving until cleared.      Tobacco: Patient has been counseled as to smoking cessation. We discussed its benefits in regards to immediate recovery and the long-term benefits of  avoidance of tobacco products.  We discussed the benefit of long-term health that tobacco cessation would convey.      BMI:  Body mass index is 25.32 kg/m².  BMI management per PCP    Follow-up Appointments: Deana Stack  is requested to see Elena Kamara APRN within 1-2 weeks from time of discharge, to follow-up with JAJA Restrepo in 1 week with chest x-ray

## 2025-03-20 ENCOUNTER — APPOINTMENT (OUTPATIENT)
Dept: GENERAL RADIOLOGY | Facility: HOSPITAL | Age: 65
DRG: 165 | End: 2025-03-20
Payer: MEDICARE

## 2025-03-20 LAB
ANION GAP SERPL CALCULATED.3IONS-SCNC: 8 MMOL/L (ref 5–15)
BUN SERPL-MCNC: 11 MG/DL (ref 8–23)
BUN/CREAT SERPL: 18.3 (ref 7–25)
CALCIUM SPEC-SCNC: 8.8 MG/DL (ref 8.6–10.5)
CHLORIDE SERPL-SCNC: 104 MMOL/L (ref 98–107)
CO2 SERPL-SCNC: 27 MMOL/L (ref 22–29)
CREAT SERPL-MCNC: 0.6 MG/DL (ref 0.57–1)
DEPRECATED RDW RBC AUTO: 53.3 FL (ref 37–54)
EGFRCR SERPLBLD CKD-EPI 2021: 99.8 ML/MIN/1.73
ERYTHROCYTE [DISTWIDTH] IN BLOOD BY AUTOMATED COUNT: 15.7 % (ref 12.3–15.4)
GLUCOSE SERPL-MCNC: 107 MG/DL (ref 65–99)
HCT VFR BLD AUTO: 29.4 % (ref 34–46.6)
HGB BLD-MCNC: 9.6 G/DL (ref 12–15.9)
MCH RBC QN AUTO: 30.2 PG (ref 26.6–33)
MCHC RBC AUTO-ENTMCNC: 32.7 G/DL (ref 31.5–35.7)
MCV RBC AUTO: 92.5 FL (ref 79–97)
PLATELET # BLD AUTO: 252 10*3/MM3 (ref 140–450)
PMV BLD AUTO: 9.4 FL (ref 6–12)
POTASSIUM SERPL-SCNC: 4.2 MMOL/L (ref 3.5–5.2)
RBC # BLD AUTO: 3.18 10*6/MM3 (ref 3.77–5.28)
SODIUM SERPL-SCNC: 139 MMOL/L (ref 136–145)
WBC NRBC COR # BLD AUTO: 6.11 10*3/MM3 (ref 3.4–10.8)

## 2025-03-20 PROCEDURE — 71045 X-RAY EXAM CHEST 1 VIEW: CPT

## 2025-03-20 PROCEDURE — 94799 UNLISTED PULMONARY SVC/PX: CPT

## 2025-03-20 PROCEDURE — 85027 COMPLETE CBC AUTOMATED: CPT | Performed by: NURSE PRACTITIONER

## 2025-03-20 PROCEDURE — 80048 BASIC METABOLIC PNL TOTAL CA: CPT | Performed by: NURSE PRACTITIONER

## 2025-03-20 PROCEDURE — 25010000002 ENOXAPARIN PER 10 MG: Performed by: SURGERY

## 2025-03-20 PROCEDURE — 25010000002 FUROSEMIDE PER 20 MG: Performed by: NURSE PRACTITIONER

## 2025-03-20 PROCEDURE — 94664 DEMO&/EVAL PT USE INHALER: CPT

## 2025-03-20 RX ORDER — FUROSEMIDE 10 MG/ML
20 INJECTION INTRAMUSCULAR; INTRAVENOUS ONCE
Status: COMPLETED | OUTPATIENT
Start: 2025-03-20 | End: 2025-03-20

## 2025-03-20 RX ADMIN — OXYCODONE HYDROCHLORIDE 10 MG: 5 TABLET ORAL at 08:29

## 2025-03-20 RX ADMIN — IBUPROFEN 600 MG: 600 TABLET ORAL at 18:19

## 2025-03-20 RX ADMIN — ACETYLCYSTEINE 3 ML: 200 SOLUTION ORAL; RESPIRATORY (INHALATION) at 07:00

## 2025-03-20 RX ADMIN — ENOXAPARIN SODIUM 40 MG: 100 INJECTION SUBCUTANEOUS at 08:29

## 2025-03-20 RX ADMIN — METHOCARBAMOL 500 MG: 500 TABLET, FILM COATED ORAL at 05:36

## 2025-03-20 RX ADMIN — ACETAMINOPHEN 650 MG: 325 TABLET, FILM COATED ORAL at 03:32

## 2025-03-20 RX ADMIN — Medication 1 TABLET: at 12:26

## 2025-03-20 RX ADMIN — ACETAMINOPHEN 650 MG: 325 TABLET, FILM COATED ORAL at 10:29

## 2025-03-20 RX ADMIN — METHOCARBAMOL 500 MG: 500 TABLET, FILM COATED ORAL at 15:15

## 2025-03-20 RX ADMIN — METHOCARBAMOL 500 MG: 500 TABLET, FILM COATED ORAL at 21:04

## 2025-03-20 RX ADMIN — ZINC SULFATE 220 MG (50 MG) CAPSULE 220 MG: CAPSULE at 08:28

## 2025-03-20 RX ADMIN — FUROSEMIDE 20 MG: 10 INJECTION, SOLUTION INTRAMUSCULAR; INTRAVENOUS at 08:37

## 2025-03-20 RX ADMIN — IPRATROPIUM BROMIDE AND ALBUTEROL SULFATE 3 ML: .5; 3 SOLUTION RESPIRATORY (INHALATION) at 06:59

## 2025-03-20 RX ADMIN — IBUPROFEN 600 MG: 600 TABLET ORAL at 23:46

## 2025-03-20 RX ADMIN — DOCUSATE SODIUM 100 MG: 100 CAPSULE, LIQUID FILLED ORAL at 08:28

## 2025-03-20 RX ADMIN — CHOLECALCIFEROL TAB 10 MCG (400 UNIT) 400 UNITS: 10 TAB at 08:29

## 2025-03-20 RX ADMIN — OXYCODONE HYDROCHLORIDE 10 MG: 5 TABLET ORAL at 12:26

## 2025-03-20 RX ADMIN — OXYCODONE HYDROCHLORIDE 10 MG: 5 TABLET ORAL at 22:31

## 2025-03-20 RX ADMIN — VARENICLINE TARTRATE 1 MG: 0.5 TABLET, FILM COATED ORAL at 08:29

## 2025-03-20 RX ADMIN — OXYCODONE HYDROCHLORIDE 10 MG: 5 TABLET ORAL at 18:19

## 2025-03-20 RX ADMIN — POLYETHYLENE GLYCOL 3350 17 G: 17 POWDER, FOR SOLUTION ORAL at 08:29

## 2025-03-20 RX ADMIN — BUPROPION HYDROCHLORIDE 150 MG: 150 TABLET, FILM COATED, EXTENDED RELEASE ORAL at 21:03

## 2025-03-20 RX ADMIN — OXYCODONE HYDROCHLORIDE AND ACETAMINOPHEN 250 MG: 500 TABLET ORAL at 08:28

## 2025-03-20 RX ADMIN — IBUPROFEN 600 MG: 600 TABLET ORAL at 05:36

## 2025-03-20 RX ADMIN — IBUPROFEN 600 MG: 600 TABLET ORAL at 11:56

## 2025-03-20 NOTE — PLAN OF CARE
Goal Outcome Evaluation:  Plan of Care Reviewed With: patient        Progress: improving  Outcome Evaluation: Pt remains alert and oriented and pleasant. Pain meds given as ordered. dressing changed to chest tube and no subq emphysema noted. tolerating diet and ambulated alcantara several times this shift. tele dc'd per dr. miguel valverde

## 2025-03-20 NOTE — PROGRESS NOTES
"Patient name: Deana Stack  Patient : 1960  VISIT # [id number]  MR #[id number]    Procedure:Procedure(s):  LEFT THORACOTOMY  LEFT LOBECTOMY WITH CHEST WALL RESECTION  MEDIASTINAL LYMPH NODE DISSECTION  Procedure Date:3/18/2025  POD:2 Days Post-Op    Subjective   On room air.  Left chest tube remains in place to waterseal.  Pain is well-controlled.  Ambulating without issue.       Objective     Visit Vitals  /60 (BP Location: Left arm, Patient Position: Lying)   Pulse 77   Temp 97.9 °F (36.6 °C) (Oral)   Resp 18   Ht 158 cm (62.21\")   Wt 63.2 kg (139 lb 5.3 oz)   SpO2 94%   BMI 25.32 kg/m²       Intake/Output Summary (Last 24 hours) at 3/20/2025 0828  Last data filed at 3/20/2025 0337  Gross per 24 hour   Intake --   Output 760 ml   Net -760 ml     LCT: 260 mL in 24 hours, serous, no airleak    Lab:     CBC:  Results from last 7 days   Lab Units 25  0540 25  0457 25  1534   WBC 10*3/mm3 6.11 9.53 10.29   HEMATOCRIT % 29.4* 30.7* 33.5*   PLATELETS 10*3/mm3 252 251 261          BMP:  Results from last 7 days   Lab Units 25  0540 25  0457 25  1534   SODIUM mmol/L 139 134* 140   POTASSIUM mmol/L 4.2 4.3 4.2   CHLORIDE mmol/L 104 100 105   CO2 mmol/L 27.0 24.0 23.0   GLUCOSE mg/dL 107* 114* 153*   BUN mg/dL 11 12 11   CREATININE mg/dL 0.60 0.48* 0.61          COAG:  Results from last 7 days   Lab Units 25  1108   INR  0.96   APTT seconds 26.5       IMAGES:       Imaging Results (Last 24 Hours)       Procedure Component Value Units Date/Time    XR Chest 1 View [[id number]] Collected: 25     Updated: 25    Narrative:      EXAMINATION:  XR CHEST 1 VW-  3/20/2025 2:30 AM     HISTORY: Postop lung surgery.     COMPARISON: 3/19/2025.     TECHNIQUE: Single view AP image.     FINDINGS: There are 2 chest tubes on the left. There is minimal lateral  pneumothorax on the left. There is subcutaneous emphysema within the  left chest wall. This is increased " slightly. There is hypoventilation  with mild atelectasis in both lung bases. There is a suture line in the  left perihilar region extending superiorly. The heart is normal in size.  There is a port catheter on the right. There has been resection of a  small portion of the posterior left sixth rib. No other acute bony  abnormality is seen.          Impression:      1. There are 2 left chest tubes. There is a minimal pneumothorax  laterally on the left.  2. Left chest and left neck subcutaneous emphysema is increased  slightly.  3. Hypoventilation with mild atelectasis in the lung bases.           This report was signed and finalized on 3/20/2025 7:29 AM by Dr. Deon Davies MD.             CXR: Left chest tube in stable position with trace left lateral pneumothorax.  Mild left chest wall subcutaneous air  Physical Exam:  General: Alert, oriented. No apparent distress.   Cardiovascular: Regular rate and rhythm without murmur, rubs, or gallops.    Pulmonary: Clear to auscultation bilaterally without wheezing, rubs, or rales.  Chest: Thoracic incisions clean, dry, and intact. Chest tube to waterseal. No air leak. Fluid is serosanguineous  Abdomen: Soft, nondistended, and nontender.  Extremities: Warm, moves all extremities. No edema.   Neurologic:  Grossly intact with no focal deficits.            Impression:  Lung cancer, left upper lobe, concern for chest wall invasion  Current active smoker  COPD        Plan:  Continue left chest tube to waterseal for 1 more day  Lasix 20 mg IV x 1 dose today  Okay to discontinue telemetry  Labs tomorrow  Routine post thoracic surgery care  Encourage pulmonary toilet and ambulation  Anticipate discharge home possibly tomorrow  Discussed with patient and nursing      JAJA Restrepo  03/20/25  08:28 CDT

## 2025-03-20 NOTE — PLAN OF CARE
Goal Outcome Evaluation:           Progress: no change  Outcome Evaluation: Pt c/o pain PRN meds given per orders, IID, CT to water seal no sub q, ambulating in alcantara, tolerating diet

## 2025-03-21 ENCOUNTER — APPOINTMENT (OUTPATIENT)
Dept: GENERAL RADIOLOGY | Facility: HOSPITAL | Age: 65
DRG: 165 | End: 2025-03-21
Payer: MEDICARE

## 2025-03-21 ENCOUNTER — READMISSION MANAGEMENT (OUTPATIENT)
Dept: CALL CENTER | Facility: HOSPITAL | Age: 65
End: 2025-03-21
Payer: MEDICARE

## 2025-03-21 VITALS
SYSTOLIC BLOOD PRESSURE: 140 MMHG | TEMPERATURE: 97.4 F | OXYGEN SATURATION: 97 % | RESPIRATION RATE: 18 BRPM | WEIGHT: 139.33 LBS | DIASTOLIC BLOOD PRESSURE: 70 MMHG | BODY MASS INDEX: 25.64 KG/M2 | HEIGHT: 62 IN | HEART RATE: 93 BPM

## 2025-03-21 LAB
ANION GAP SERPL CALCULATED.3IONS-SCNC: 10 MMOL/L (ref 5–15)
BUN SERPL-MCNC: 9 MG/DL (ref 8–23)
BUN/CREAT SERPL: 17.6 (ref 7–25)
CALCIUM SPEC-SCNC: 8.8 MG/DL (ref 8.6–10.5)
CHLORIDE SERPL-SCNC: 101 MMOL/L (ref 98–107)
CO2 SERPL-SCNC: 26 MMOL/L (ref 22–29)
CREAT SERPL-MCNC: 0.51 MG/DL (ref 0.57–1)
CYTO UR: NORMAL
DEPRECATED RDW RBC AUTO: 52.3 FL (ref 37–54)
EGFRCR SERPLBLD CKD-EPI 2021: 103.7 ML/MIN/1.73
ERYTHROCYTE [DISTWIDTH] IN BLOOD BY AUTOMATED COUNT: 15.4 % (ref 12.3–15.4)
GLUCOSE SERPL-MCNC: 104 MG/DL (ref 65–99)
HCT VFR BLD AUTO: 29.5 % (ref 34–46.6)
HGB BLD-MCNC: 9.8 G/DL (ref 12–15.9)
LAB AP CASE REPORT: NORMAL
LAB AP SYNOPTIC CHECKLIST: NORMAL
Lab: NORMAL
Lab: NORMAL
MCH RBC QN AUTO: 30.5 PG (ref 26.6–33)
MCHC RBC AUTO-ENTMCNC: 33.2 G/DL (ref 31.5–35.7)
MCV RBC AUTO: 91.9 FL (ref 79–97)
PATH REPORT.FINAL DX SPEC: NORMAL
PATH REPORT.GROSS SPEC: NORMAL
PLATELET # BLD AUTO: 255 10*3/MM3 (ref 140–450)
PMV BLD AUTO: 9.3 FL (ref 6–12)
POTASSIUM SERPL-SCNC: 4.3 MMOL/L (ref 3.5–5.2)
RBC # BLD AUTO: 3.21 10*6/MM3 (ref 3.77–5.28)
SODIUM SERPL-SCNC: 137 MMOL/L (ref 136–145)
WBC NRBC COR # BLD AUTO: 4.82 10*3/MM3 (ref 3.4–10.8)

## 2025-03-21 PROCEDURE — 80048 BASIC METABOLIC PNL TOTAL CA: CPT | Performed by: NURSE PRACTITIONER

## 2025-03-21 PROCEDURE — 25010000002 ONDANSETRON PER 1 MG: Performed by: SURGERY

## 2025-03-21 PROCEDURE — 71046 X-RAY EXAM CHEST 2 VIEWS: CPT

## 2025-03-21 PROCEDURE — 85027 COMPLETE CBC AUTOMATED: CPT | Performed by: NURSE PRACTITIONER

## 2025-03-21 PROCEDURE — 25010000002 ENOXAPARIN PER 10 MG: Performed by: SURGERY

## 2025-03-21 PROCEDURE — 71045 X-RAY EXAM CHEST 1 VIEW: CPT

## 2025-03-21 RX ORDER — HYDROCODONE BITARTRATE AND ACETAMINOPHEN 5; 325 MG/1; MG/1
1 TABLET ORAL EVERY 6 HOURS PRN
Qty: 30 TABLET | Refills: 0 | Status: SHIPPED | OUTPATIENT
Start: 2025-03-21

## 2025-03-21 RX ORDER — ACETAMINOPHEN 325 MG/1
650 TABLET ORAL EVERY 6 HOURS SCHEDULED
Status: DISCONTINUED | OUTPATIENT
Start: 2025-03-21 | End: 2025-03-21 | Stop reason: HOSPADM

## 2025-03-21 RX ADMIN — IBUPROFEN 600 MG: 600 TABLET ORAL at 06:06

## 2025-03-21 RX ADMIN — METHOCARBAMOL 500 MG: 500 TABLET, FILM COATED ORAL at 06:06

## 2025-03-21 RX ADMIN — OXYCODONE HYDROCHLORIDE 10 MG: 5 TABLET ORAL at 06:06

## 2025-03-21 RX ADMIN — VARENICLINE TARTRATE 1 MG: 0.5 TABLET, FILM COATED ORAL at 09:39

## 2025-03-21 RX ADMIN — POLYETHYLENE GLYCOL 3350 17 G: 17 POWDER, FOR SOLUTION ORAL at 09:40

## 2025-03-21 RX ADMIN — ACETAMINOPHEN 650 MG: 325 TABLET, FILM COATED ORAL at 09:39

## 2025-03-21 RX ADMIN — DOCUSATE SODIUM 100 MG: 100 CAPSULE, LIQUID FILLED ORAL at 09:39

## 2025-03-21 RX ADMIN — OXYCODONE HYDROCHLORIDE AND ACETAMINOPHEN 250 MG: 500 TABLET ORAL at 09:39

## 2025-03-21 RX ADMIN — ZINC SULFATE 220 MG (50 MG) CAPSULE 220 MG: CAPSULE at 09:39

## 2025-03-21 RX ADMIN — ENOXAPARIN SODIUM 40 MG: 100 INJECTION SUBCUTANEOUS at 09:40

## 2025-03-21 RX ADMIN — CHOLECALCIFEROL TAB 10 MCG (400 UNIT) 400 UNITS: 10 TAB at 09:39

## 2025-03-21 RX ADMIN — ONDANSETRON 4 MG: 2 INJECTION INTRAMUSCULAR; INTRAVENOUS at 11:35

## 2025-03-21 NOTE — PROGRESS NOTES
"Patient name: Deana Stack  Patient : 1960  VISIT # [id number]  MR #[id number]    Procedure:Procedure(s):  LEFT THORACOTOMY  LEFT LOBECTOMY WITH CHEST WALL RESECTION  MEDIASTINAL LYMPH NODE DISSECTION  Procedure Date:3/18/2025  POD:3 Days Post-Op    Subjective     On room air.  Left chest tube remains in place to waterseal.  Continues with good pain control and is ambulating without issue.  She is hopeful for chest tube removal and discharge home today.       Objective     Visit Vitals  /65 (BP Location: Right arm, Patient Position: Sitting)   Pulse 98   Temp 98 °F (36.7 °C) (Oral)   Resp 18   Ht 158 cm (62.21\")   Wt 63.2 kg (139 lb 5.3 oz)   SpO2 93%   BMI 25.32 kg/m²       Intake/Output Summary (Last 24 hours) at 3/21/2025 0856  Last data filed at 3/21/2025 0600  Gross per 24 hour   Intake --   Output 3450 ml   Net -3450 ml     LCT: 100 mL in 24 hours, serous, no airleak    Lab:     CBC:  Results from last 7 days   Lab Units 25  0255 25  0540 25  0457   WBC 10*3/mm3 4.82 6.11 9.53   HEMATOCRIT % 29.5* 29.4* 30.7*   PLATELETS 10*3/mm3 255 252 251          BMP:  Results from last 7 days   Lab Units 25  0255 25  0540 25  0457   SODIUM mmol/L 137 139 134*   POTASSIUM mmol/L 4.3 4.2 4.3   CHLORIDE mmol/L 101 104 100   CO2 mmol/L 26.0 27.0 24.0   GLUCOSE mg/dL 104* 107* 114*   BUN mg/dL 9 11 12   CREATININE mg/dL 0.51* 0.60 0.48*          COAG:  Results from last 7 days   Lab Units 25  1108   INR  0.96   APTT seconds 26.5       IMAGES:       Imaging Results (Last 24 Hours)       Procedure Component Value Units Date/Time    XR Chest 1 View [[id number]] Collected: 25     Updated: 25    Narrative:      EXAMINATION: XR CHEST 1 VW-     3/21/2025 2:25 AM     HISTORY: post op lung surgery     A frontal projection of the chest is compared with the previous study  dated 3/20/2025.     The lungs are poorly expanded, similar to the previous study.   "   A small pneumothorax along the left lateral chest wall persist. No  change. Left-sided chest tubes are in place.     There is no pleural effusion. No pulmonary congestion.     Postsurgical changes of the left chest are similar to the previous  study. Suture line is seen located medially parallel to the mediastinum  and left hilum.     The heart size is in the normal range. Atheromatous change of thoracic  aorta noted.     A port catheter is seen introduced through the right subclavian vein  with distal end at the atriocaval junction. No change.     There is soft tissue air along the left lateral chest wall and the left  side of the neck similar to the previous study.     No acute bony abnormality.       Impression:      1. No significant change since the previous study. A small left chest  pneumothorax persists. The tubes and lines in place.                          This report was signed and finalized on 3/21/2025 7:00 AM by Dr. Monik Marie MD.             CXR: Left chest tube in stable position with ongoing small left lateral pneumothorax.    Physical Exam:  General: Alert, oriented. No apparent distress.   Cardiovascular: Regular rate and rhythm without murmur, rubs, or gallops.    Pulmonary: Clear to auscultation bilaterally without wheezing, rubs, or rales.  Chest: Thoracic incisions clean, dry, and intact. Chest tube to waterseal. No air leak. Fluid is serosanguineous  Abdomen: Soft, nondistended, and nontender.  Extremities: Warm, moves all extremities. No edema.   Neurologic:  Grossly intact with no focal deficits.            Impression:  Lung cancer, left upper lobe, concern for chest wall invasion  Current active smoker  COPD        Plan:  Left chest tubes removed without remark.  PA and lateral chest x-ray today at 11:00  Routine post thoracic surgery care  Encourage pulmonary toilet and ambulation  If follow-up imaging remained stable, plan on discharge home today.  Routine follow-up with me in  1 week with chest x-ray  Discussed with patient and nursing      Renate Tavares, JAJA  03/21/25  08:56 CDT

## 2025-03-21 NOTE — PLAN OF CARE
Goal Outcome Evaluation:           Progress: improving  Outcome Evaluation: VSS.  Pt has rested well throughout shift.  PRN and scheduled pain medications given and pain well controlled.  Pt hoping to have chest tube removed and d/c home today.  Safety maintained.

## 2025-03-22 NOTE — OUTREACH NOTE
Prep Survey      Flowsheet Row Responses   [facility] facility patient discharged from? [facility]   Is LACE score < 7 ? No   Eligibility Readm Mgmt   Discharge diagnosis 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, LEFT THORACOTOMY  LEFT LOBECTOMY WITH CHEST WALL RESECTION  MEDIASTINAL LYMPH NODE DISSECTION   Does the patient have one of the following disease processes/diagnoses(primary or secondary)? Cardiothoracic surgery   Does the patient have Home health ordered? No   Is there a DME ordered? No   Prep survey completed? Yes            Kristina SERVIN - Registered Nurse

## 2025-03-25 NOTE — PROGRESS NOTES
Subjective   Chief Complaint   Patient presents with    Post-op Follow-up     Patient had Thoracotomy on 3/18. CXR today.       Patient ID: Deana Stack is a 65 y.o. female who is here for follow-up having had Theraputic Bronchoscopy with Lavage, Left Thoracotomy with Left Upper Lobectomy, Mediastinal Lymph Node Dissection by Dr. Beltre on 3/18/2025     History of Present Illness  Ms. Stack is a 64-year-old female who underwent the above listed procedure by Dr. Beltre on 3/18/2025.  Postoperative recovery was unremarkable and the left chest tubes were removed on postop day 3 and she ultimately met criteria for discharge home on the afternoon of postop day 3.  She is here today for follow-up with a repeat chest x-ray and to discuss final pathology results.  She states overall she is doing remarkably well.  Pain is well-controlled with ibuprofen.  She does occasionally take 1/2 a pain pill at night but otherwise just complains of soreness at thoracotomy site.  Ambulating without issue.      The following portions of the patient's history were reviewed and updated as appropriate: allergies, current medications, past family history, past medical history, past social history, past surgical history and problem list.    Review of Systems   Constitutional:  Negative for chills, diaphoresis, fatigue and fever.   HENT:  Negative for trouble swallowing and voice change.    Eyes:  Negative for visual disturbance.   Respiratory:  Negative for chest tightness and shortness of breath.    Cardiovascular:  Negative for chest pain, palpitations and leg swelling.   Gastrointestinal:  Negative for abdominal pain, diarrhea, nausea and vomiting.   Musculoskeletal:  Negative for arthralgias and myalgias.   Skin:  Negative for color change, pallor, rash and wound.   Neurological:  Negative for dizziness, syncope and light-headedness.   Psychiatric/Behavioral:  Negative for agitation, confusion and sleep disturbance.        Objective  "  Visit Vitals  /61   Pulse 104   Ht 158 cm (62.21\")   Wt 64.7 kg (142 lb 9.6 oz)   SpO2 98%   BMI 25.91 kg/m²       Physical Exam  Vitals reviewed.   Constitutional:       General: She is not in acute distress.  HENT:      Head: Normocephalic.   Eyes:      Pupils: Pupils are equal, round, and reactive to light.   Cardiovascular:      Rate and Rhythm: Normal rate and regular rhythm.      Heart sounds: Normal heart sounds. No murmur heard.  Pulmonary:      Breath sounds: Normal breath sounds. No wheezing or rales.      Comments: Thoracic incisions are C/D/I and healing nicely.  Abdominal:      General: There is no distension.      Palpations: Abdomen is soft.      Tenderness: There is no abdominal tenderness.   Musculoskeletal:         General: No swelling or tenderness.   Skin:     General: Skin is warm and dry.   Neurological:      General: No focal deficit present.      Mental Status: She is alert and oriented to person, place, and time.   Psychiatric:         Mood and Affect: Mood normal.         Behavior: Behavior normal.         Thought Content: Thought content normal.         Judgment: Judgment normal.             Assessment & Plan     Independent Review of Radiographic Studies:    CXR: No pneumothorax.  Left basilar atelectasis.  Overall stable exam.          Diagnoses and all orders for this visit:    1. 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 (Primary)  -     XR Chest 2 View; Future           Overall, Deana Stack is doing well.  Pathology results reviewed with patient and her .  She is going to call Dr. Becerra's office to schedule sooner follow-up.  Following post op thoracic surgery home instructions. Provided support and encouragement. All questions have been answered to the best of my ability.  Patient has been instructed to contact our office with any questions or concerns should they arise prior to the next office visit. Plan on 1 month follow up with Dr. Beltre with repeat chest " xray.  Overall very happy with her progress.    I have congratulated Deana Stack on successful smoking cessation.     Deana Stack  reports that she quit smoking 12 days ago. Her smoking use included cigarettes. She started smoking about 48 years ago. She has a 47.9 pack-year smoking history. She has been exposed to tobacco smoke. She has never used smokeless tobacco. I have educated her on the risk of diseases from using tobacco products such as cancer, COPD, and heart disease.     I spent 3  minutes counseling the patient.    Advance Care Planning   ACP discussion was held with the patient during this visit. Patient does not have an advance directive, declines further assistance.

## 2025-03-27 ENCOUNTER — OFFICE VISIT (OUTPATIENT)
Dept: CARDIAC SURGERY | Facility: CLINIC | Age: 65
End: 2025-03-27
Payer: MEDICARE

## 2025-03-27 ENCOUNTER — HOSPITAL ENCOUNTER (OUTPATIENT)
Dept: GENERAL RADIOLOGY | Facility: HOSPITAL | Age: 65
Discharge: HOME | End: 2025-03-27
Admitting: NURSE PRACTITIONER
Payer: MEDICARE

## 2025-03-27 ENCOUNTER — TELEPHONE (OUTPATIENT)
Dept: ONCOLOGY | Facility: CLINIC | Age: 65
End: 2025-03-27

## 2025-03-27 VITALS
HEIGHT: 62 IN | OXYGEN SATURATION: 98 % | SYSTOLIC BLOOD PRESSURE: 110 MMHG | DIASTOLIC BLOOD PRESSURE: 61 MMHG | BODY MASS INDEX: 26.24 KG/M2 | HEART RATE: 104 BPM | WEIGHT: 142.6 LBS

## 2025-03-27 DIAGNOSIS — C34.12 ADENOCARCINOMA OF UPPER LOBE OF LEFT LUNG: Primary | ICD-10-CM

## 2025-03-27 DIAGNOSIS — C34.12 ADENOCARCINOMA OF UPPER LOBE OF LEFT LUNG: ICD-10-CM

## 2025-03-27 PROCEDURE — 1160F RVW MEDS BY RX/DR IN RCRD: CPT | Performed by: NURSE PRACTITIONER

## 2025-03-27 PROCEDURE — 99024 POSTOP FOLLOW-UP VISIT: CPT | Performed by: NURSE PRACTITIONER

## 2025-03-27 PROCEDURE — 71046 X-RAY EXAM CHEST 2 VIEWS: CPT

## 2025-03-27 PROCEDURE — 1159F MED LIST DOCD IN RCRD: CPT | Performed by: NURSE PRACTITIONER

## 2025-03-27 NOTE — TELEPHONE ENCOUNTER
Pt called in, she just left Dr. Beltre's office and she has had all her pathology done and he thought Dr. Tello might want to see her sooner than her scheduled appt for 4/24/25. Or is that appointment good to keep.

## 2025-04-02 ENCOUNTER — READMISSION MANAGEMENT (OUTPATIENT)
Dept: CALL CENTER | Facility: HOSPITAL | Age: 65
End: 2025-04-02
Payer: MEDICARE

## 2025-04-02 NOTE — OUTREACH NOTE
CT Surgery Week 1 Survey      Flowsheet Row Responses   List of hospitals in [place] patient discharged from? [facility]   Does the patient have one of the following disease processes/diagnoses(primary or secondary)? Cardiothoracic surgery   Week 1 attempt successful? Yes   Call start time 1014   Call end time 1019   Discharge diagnosis 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, LEFT THORACOTOMY  LEFT LOBECTOMY WITH CHEST WALL RESECTION  MEDIASTINAL LYMPH NODE DISSECTION   Person spoke with today (if not patient) and relationship Patient   Meds reviewed with patient/caregiver? Yes   Does the patient have all medications related to this admission filled (includes all antibiotics, pain medications, cardiac medications, etc.) Yes   Is the patient taking all medications as directed (includes completed medication regime)? Yes   Medication comments Patient reports just taking tylenol and ibuprofen for pain   Does the patient have a primary care provider?  Yes   Does the patient have an appointment scheduled with their C/T surgeon? Yes  [5/1 Dr Beltre]   Has the patient kept scheduled appointments due by today? Yes  [Patient has seen CT NP 3/27]   Has home health visited the patient within 72 hours of discharge? N/A   Psychosocial issues? No   Did the patient receive a copy of their discharge instructions? Yes   Nursing interventions Reviewed instructions with patient   What is the patient's perception of their health status since discharge? Improving   Is the patient /caregiver able to teach back basic post-op care? Continue use of incentive spirometry at least 1 week post discharge, Practice cough and deep breath every 4 hours while awake   Is the patient/caregiver able to teach back signs and symptoms of incisional infection? Increased redness, swelling or pain at the incisonal site, Increased drainage or bleeding  [Patient reports  looks at and so far, healing well.]   Is the patient/caregiver able to teach back steps to  recovery at home? Set small, achievable goals for return to baseline health, Rest and rebuild strength, gradually increase activity, Eat a well-balance diet   If the patient is a current smoker, are they able to teach back resources for cessation? Not a smoker   Is the patient/caregiver able to teach back the hierarchy of who to call/visit for symptoms/problems? PCP, Specialist, Home health nurse, Urgent Care, ED, 911 Yes   Week 1 call completed? Yes   Is the patient interested in additional calls from an ambulatory ? No   Would this patient benefit from a Referral to [facility] Social Work? No   Call end time 1019              MILTON PERSAUD - Registered Nurse

## 2025-04-04 ENCOUNTER — TELEPHONE (OUTPATIENT)
Dept: CARDIAC SURGERY | Facility: CLINIC | Age: 65
End: 2025-04-04
Payer: MEDICARE

## 2025-04-04 NOTE — TELEPHONE ENCOUNTER
Pt had Left Thoracotomy - Left, Left Lobectomy With Chest Wall Resection - Left, and Mediastinal Lymph Node Dissection on 3/18/2025     Pt calling today to find out when she can drive again and would like to speak with JAJA Restrepo. Informed her that JAJA Restrepo and Dr. Beltre are out of the office and will return on 04/14/2025. Last noted on 03/21, pt was instructed no heavy lifting greater than 10 pounds and refrain from driving until cleared. Advised pt that clearance to drive is typically done at the first official visit with surgeon after surgery and she is scheduled to see Dr. Beltre on 05/01. Pt voiced understanding.

## 2025-04-14 ENCOUNTER — READMISSION MANAGEMENT (OUTPATIENT)
Dept: CALL CENTER | Facility: HOSPITAL | Age: 65
End: 2025-04-14
Payer: MEDICARE

## 2025-04-14 NOTE — OUTREACH NOTE
CT Surgery Week 2 Survey      Flowsheet Row Responses   [facility] facility patient discharged from? [facility]   Does the patient have one of the following disease processes/diagnoses(primary or secondary)? Cardiothoracic surgery   Week 2 attempt successful? No   Unsuccessful attempts Attempt 1   Revoke Other            Gretel H - Registered Nurse

## 2025-04-17 NOTE — PROGRESS NOTES
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  She was seen by Dr. Beltre of CT surgery and consideration of lobectomy.  MRI chest showed 3 cm contact with the chest wall.  Thus the patient has received preoperative/neoadjuvant carboplatin + paclitaxel -C1, 1/16/2025;C2, 1/23/2025; C3, 1/30/2025; C4, 2/6/2025 with concurrent radiation beginning 1/16/2025-2/7/2025 (14 fractions).  Subsequent CT of the chest showed anterior left upper lobe mass smaller in size with appropriate treatment response after radiation still involving the chest wall but no evidence of metastatic disease.  On 3/18/2025 underwent left thoracotomy with left upper lobectomy and mediastinal lymph node dissection.  Pathologic tumor stage: IB ([id number]). She is accompanied by a friend, Clementina (previously with her spouse, Feli).    I have reviewed the HPI and verified with the patient the accuracy of it. No changes to interval history since the information was documented. Mitchel Tello MD 04/24/25      Diagnostic abnormalities:  - Bronchiectasis, GERD, current everyday smoker, and recently diagnosed lung cancer.  -07/31/2024 - CT Chest Low Dose:  Emphysematous changes with a peripheral area of consolidation in the medial aspect of the left upper lobe. This could be secondary to scarring. Comparison with any prior exams would be beneficial to assist stability. If none are available, followed-up PET/CT should be considered for more complete characterization.     -09/16/2024 - PET Scan:  The pleural-based nodule within the anterior LEFT upper lobe shows unchanged  size compared with the outside CT exam from 6 weeks ago.  While this could represent focal pneumonia the unchanged size over 6 weeks and the degree of FDG uptake is compatible with a primary lung neoplasm.  No abnormal FDG uptake is seen outside of the chest.     -10/14/2024 - Appointment with JAJA Deluna:  Left upper lobe pulmonary nodule (Primary)  schedule for Ion robotic bronchoscopy with Dr. Dewitt. Risks and benefits discussed. She is not on any blood thinners.  Follow up:  Plan as above. Office follow up in a few weeks post procedure. Call sooner if needed.      -10/22/2024 - CT Chest without contrast:  Stable irregular 2.6 x 1.3 cm anterior subpleural left upper lobe pulmonary nodule, hypermetabolic on PET/CT of 2024 concerning for malignancy. No pathologic intrathoracic or axillary lymphadenopathy.  Moderate emphysema.      -10/28/2024 - Bronchoscopy with biopsy per :  Nodule, left upper lobe lung, Ion biopsy (cellblock):  Non-small cell carcinoma, adenocarcinoma consistent with lung primary.  Nodule, left upper lobe lung, Ion fine-needle aspiration (smears and ThinPrep):  Non-small cell carcinoma, adenocarcinoma.  Station 7 lymph node, EBUS (smears and ThinPrep):  No malignant cells identified.  Mixed lymphocytes and histiocytes consistent with lymph node.  Bronchial washing (ThinPrep):  No malignant cells identified.  Benign bronchial epithelial cells, macrophages, and mixed inflammation present.     -2024 - Appointment with AJJA Deluna:  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  We reviewed cytology from recent ion. CTS referral orders have been placed.   Follow up:  Follow up in 3 months      -2024 - Pulmonary Function Tests:  Pre Drug % Predicted   FVC: 106%  FEV1: 87%  FEF 25-75%: 46%  FEV1/FVC: 66.44%  T%  RV: 191%  DLCO: 91%  D/VAsb: 71%     Interpretation Spirometry   Spirometry shows mild obstruction. There is reduced midflow suggesting small  airway/airflow obstruction.   Review of FVL curve   Patient's effort is normal.   Lung Volume Measurements  Measurements show elevated residual volume consistent with gas trapping and elevated TLC consistent with hyperventilation.   Diffusion Capacity  The patient's diffusion capacity is normal.  Diffusion capacity is normal when corrected for alveolar volume.      -11/21/2024 - Appointment with Dr. Beltre:  I reviewed her imaging and I am concerned somewhat of the chest wall invasion.    Given this we plan to get an MRI of the chest to further assess for this ensure that the portion abutting the visceral and parietal pleura is atelectasis and not tumor.    As long as no chest wall invasion she is a good candidate for robotic left upper lobectomy.  I discussed treatment options with her and we agree that this is the best treatment option for her.  I reviewed her PFTs and she has adequate lung reserve to tolerate anatomic lobectomy.    We discussed preoperative operative postoperative expectations as well as risk and benefits. We discussed the risk and benefits of surgery and alternatives including but not limited to bleeding, infection, injury to major vessels or organs, need for transfusion, need for conversion to open operation, pneumonia, prolonged airleak, risk of anesthesia, and/or death.  She understands these risk and agrees to proceed.  Will obtain MRI of the chest to assess for chest wall invasion if that looks okay plan on proceeding with surgery soon.    Thank you for trusting me the care of Ms. Stack.  Please do not hesitate to call questions or concerns.     -12/05/2024 - MRI Chest with and without contrast:  Redemonstrated left upper lobe lung mass. There is greater than 3 cm tumor contact with the chest wall and there is some loss of the extrapleural fat plane along the anterolateral aspect of the mass. Findings are suspicious for chest wall invasion, though not definitive.     -12/06/2024 -  Documentation by Dr. Beltre's office:  Dr. Beltre has reviewed imaging.  There is concern for chest wall invasion.  He recommends a referral to radiation oncology for radiation therapy and then reassess after completion to determine if surgery is an option.  I have called the patient and discussed this with her and she is agreeable.  Referral placed to radiation oncology.    - 1/2/2025-CMP normal, CEA 2.97, B12 576, folate 16.9, ferritin 343, iron 79, iron saturation 23%, CBC normal    - 1/10/2025-[facility]  gene panel on biopsy specimen, 10/28/2025-PD-L1 expression negative.  Potentially actionable genomic variant: KRAS (FDA approved therapies: Adagrasib, sotorasib).  TMB 6.3 (60th percentile), TOY.  Pertinent negatives: EGFR, BRAF, ALK, ROS1, RET, MET, HER2    - 1/31/2025-MRI brain-unremarkable.  No intracranial metastatic disease.     Previous interventions:  -12/16/2024 - Consult Dr. Collazo:  Deana Stack is a 64-year-old female with a good performance status that has an incidentally diagnosed clinical stage IIB (T3 N0 M0) non-small cell carcinoma of the left lung consistent with adenocarcinoma.  She has had a surgical evaluation with recommendations of preoperative chemoradiation therapy in an effort to have an R0 resection for definitive therapy.  I agree with these recommendations and that the patient is a candidate.  We will plan for preoperative chemoradiation therapy per NCCN guidelines.  I anticipate  preoperative chemoradiation therapy using IMRT/IGRT techniques to a total dose of 5400 cGy over 5-6 weeks, final course pending.     -01/02/2024 - Patient is seen for preoperative systemic therapy considerations:  - Neoadjuvant carboplatin and paclitaxel-C1, 1/16/2025;C2, 1/23/2025; C3, 1/30/2025; C4, 2/6/2025   - Concurrent radiation beginning 1/16/2025-2/7/2025 (14 fractions).    -  3/18/2025 underwent left thoracotomy with left upper lobectomy and mediastinal lymph node dissection.  Final diagnosis: 1.   Left upper lobe lung, lobectomy:  Invasive lepidic adenocarcinoma.  Maximum tumor diameter is 3.8 cm.  The tumor focally involves the visceral pleural surface.  The surgical margins are free of tumor.   2.  Level 5 lymph node #1, excision:  1 benign lymph node.   3.  Level 5 lymph node #2, excision:  1 benign lymph node.   4.  Level 6 lymph node by ascending aorta, excision:  1 benign lymph node with hyalinized calcified granulomas.   5.  Level 9 lymph node, excision:  1 benign lymph node.   6.  Level 11 lymph node on pulmonary artery, biopsy:  1 benign lymph node.   7.  Level 10 lymph node on bronchus, excision:  1 benign lymph node.   8.  Level 7 lymph node, excision:  1 benign lymph node.     Synoptic Checklist   LUNG   8th Edition - Protocol posted: 9/21/2022LUNG - All Specimens  SPECIMEN   Procedure  Lobectomy   Specimen Laterality  Left   TUMOR   Tumor Focality  Single focus   Tumor Site  Upper lobe of lung   Tumor Size     Total Tumor Size (size of entire tumor)  Greatest Dimension (Centimeters): 3.8 cm   Size of Invasive Component  Greatest Dimension (Centimeters): 3.8 cm   Histologic Type  Invasive lepidic adenocarcinoma   Histologic Patterns Present  Acinar: 25     Lepidic: 75   Histologic Grade  G2, moderately differentiated   Visceral Pleura Invasion  Present   Direct Invasion of Adjacent Structures  Not applicable (no adjacent structures present)   Treatment Effect  No known presurgical therapy   Lymphovascular Invasion  Not identified   MARGINS   Margin Status for Invasive Carcinoma  All margins negative for invasive carcinoma   Closest Margin(s) to Invasive Carcinoma  Bronchial     Parenchymal   Distance from Invasive Carcinoma to Closest Margin  At least: 3.8  cm   Margin Status for Non-Invasive Tumor  All margins negative for non-invasive tumor   REGIONAL LYMPH NODES   Lymph Node(s) from Prior Procedures  Not included   Regional Lymph Node Status  All regional lymph nodes negative for tumor   Number  of Lymph Nodes Examined  7   Dusty Site(s) Examined  7: Subcarinal     5: Subaortic / aortopulmonary (AP) / AP window     6: Para-aortic (ascending aorta or phrenic)     9L: Pulmonary ligament     10L: Hilar     11L: Interlobar   PATHOLOGIC STAGE CLASSIFICATION (pTNM, AJCC 8th Edition)   Reporting of pT, pN, and (when applicable) pM categories is based on information available to the pathologist at the time the report is issued. As per the AJCC (Chapter 1, 8th Ed.) it is the managing physician’s responsibility to establish the final pathologic stage based upon all pertinent information, including but potentially not limited to this pathology report.   pT Category  pT2a   pN Category  pN0               LABS    Lab Results - Last 18 Months   Lab Units 03/21/25  0255 03/20/25  0540 03/19/25  0457 03/18/25  1534 03/14/25  1108 02/06/25  0808 01/30/25  0818 01/23/25  0804 01/16/25  0816 01/02/25  1225   HEMOGLOBIN g/dL 9.8* 9.6* 10.2* 11.1* 12.1 12.9 12.3 13.2 12.7 13.9   HEMATOCRIT % 29.5* 29.4* 30.7* 33.5* 35.3 40.1 38.5 40.3 39.5 42.4   MCV fL 91.9 92.5 90.6 90.5 88.7 90.1 90.6 90.4 89.4 88.9   WBC 10*3/mm3 4.82 6.11 9.53 10.29 3.50 3.62 4.05 4.63 5.69 7.37   RDW % 15.4 15.7* 15.3 15.1 14.9 13.1 13.1 12.7 12.7 13.0   MPV fL 9.3 9.4 9.4 9.1 9.0 9.1 9.4 9.4 9.4 9.4   PLATELETS 10*3/mm3 255 252 251 261 236 178 197 199 188 221   IMM GRAN % %  --   --   --   --  0.3 0.3 0.5 0.6* 0.4 0.1   NEUTROS ABS 10*3/mm3  --   --   --   --  1.90 2.42 2.80 3.27 4.14 4.34   LYMPHS ABS 10*3/mm3  --   --   --   --  1.05 0.78 0.79 0.86 0.97 2.38   MONOS ABS 10*3/mm3  --   --   --   --  0.45 0.37 0.38 0.37 0.44 0.51   EOS ABS 10*3/mm3  --   --   --   --  0.08 0.03 0.05 0.08 0.09 0.11   BASOS ABS 10*3/mm3  --   --   --   --  0.01 0.01 0.01 0.02 0.03 0.02   IMMATURE GRANS (ABS) 10*3/mm3  --   --   --   --  0.01 0.01 0.02 0.03 0.02 0.01   NRBC /100 WBC  --   --   --   --  0.0 0.0 0.0 0.0 0.0 0.0       PAST MEDICAL HISTORY:  ALLERGIES:  No  Known Allergies  CURRENT MEDICATIONS:  Outpatient Encounter Medications as of 4/24/2025   Medication Sig Dispense Refill    buPROPion SR (WELLBUTRIN SR) 150 MG 12 hr tablet Take 1 tablet by mouth Daily.      Cholecalciferol 10 MCG (400 UNIT) tablet Take 1 tablet by mouth Daily.      esomeprazole (nexIUM) 20 MG capsule Take 1 capsule by mouth Every Morning Before Breakfast.      HYDROcodone-acetaminophen (NORCO) 5-325 MG per tablet Take 1 tablet by mouth Every 6 (Six) Hours As Needed for Moderate Pain. 30 tablet 0    hydrOXYzine (ATARAX) 10 MG tablet Take 1 tablet by mouth 3 (Three) Times a Day As Needed for Anxiety.      multivitamin with minerals tablet tablet Take 1 tablet by mouth Daily.      varenicline (CHANTIX) 1 MG tablet Take 1 tablet by mouth Daily.      vitamin C (ASCORBIC ACID) 250 MG tablet Take 1 tablet by mouth Daily.      zinc sulfate (ZINCATE) 220 (50 Zn) MG capsule Take 1 capsule by mouth Daily.       No facility-administered encounter medications on file as of 4/24/2025.     ADULT ILLNESSES:  Patient Active Problem List   Diagnosis Code    Malignant neoplasm of upper lobe of left lung C34.12    Current every day smoker F17.200    Encounter for care related to Port-a-Cath Z45.2    ERRONEOUS ENCOUNTER--DISREGARD     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 C34.12    Lung cancer C34.90     SURGERIES:  Past Surgical History:   Procedure Laterality Date    BRONCHOSCOPY WITH ION ROBOTIC ASSIST N/A 10/28/2024    Procedure: BRONCHOSCOPY WITH ION ROBOT;  Surgeon: Donovan Dewitt MD;  Location: [facility] OR;  Service: Robotics - Pulmonary;  Laterality: N/A;  preop; GALLO nodule   postop GALLO nodule   PCP Elena Fitch    CHOLECYSTECTOMY N/A     HYSTERECTOMY      LOBECTOMY Left 3/18/2025    Procedure: LEFT LOBECTOMY WITH CHEST WALL RESECTION;  Surgeon: Jaime Beltre MD;  Location:  [facility] OR;  Service: Cardiothoracic;  Laterality: Left;    LUNG BIOPSY      LYMPH NODE DISSECTION N/A 3/18/2025     Procedure: MEDIASTINAL LYMPH NODE DISSECTION;  Surgeon: Jaime Beltre MD;  Location:  [facility] OR;  Service: Cardiothoracic;  Laterality: N/A;    THORACOTOMY Left 3/18/2025    Procedure: LEFT THORACOTOMY;  Surgeon: Jaime Beltre MD;  Location:  [facility] OR;  Service: Cardiothoracic;  Laterality: Left;    VENOUS ACCESS DEVICE (PORT) INSERTION N/A 01/14/2025    Procedure: Single Lumen Port-a-cath insertion with flouroscopy;  Surgeon: Osvaldo Lemus MD;  Location:  [facility] OR;  Service: General;  Laterality: N/A;     HEALTH MAINTENANCE ITEMS:  Health Maintenance Due   Topic Date Due    DXA SCAN  Never done    TDAP/TD VACCINES (1 - Tdap) Never done    ZOSTER VACCINE (1 of 2) Never done    MAMMOGRAM  Never done    COLORECTAL CANCER SCREENING  Never done    COVID-19 Vaccine (3 - Moderna risk series) 04/28/2021    HEPATITIS C SCREENING  Never done    ANNUAL WELLNESS VISIT  Never done       <no information>  Last Completed Colonoscopy    This patient has no relevant Health Maintenance data.       Immunization History   Administered Date(s) Administered    COVID-19 (MODERNA) 1st,2nd,3rd Dose Monovalent 03/03/2021, 03/31/2021    Fluzone (or Fluarix & Flulaval for VFC) >6mos 12/01/2015    Pneumococcal Conjugate 20-Valent (PCV20) 06/28/2024     Last Completed Mammogram    This patient has no relevant Health Maintenance data.           FAMILY HISTORY:  Family History   Problem Relation Age of Onset    Cancer Mother         Had left breast removed at age 71    Diabetes Mother         Diabetes at age 45    Asthma Sister         Passed away due to Covid19    Diabetes Brother         Diabetes age 70    Diabetes Brother         Diabetes age 65    Diabetes Brother         age 65     SOCIAL HISTORY:  Social History     Socioeconomic History    Marital status:    Tobacco Use    Smoking status: Former     Current packs/day: 0.00     Average packs/day: 1 pack/day for 48.2 years (47.9 ttl pk-yrs)     Types: Cigarettes     Start  "date: 1977     Quit date: 3/15/2025     Years since quittin.1     Passive exposure: Current    Smokeless tobacco: Never   Vaping Use    Vaping status: Never Used   Substance and Sexual Activity    Alcohol use: Never    Drug use: Never    Sexual activity: Not Currently     Partners: Male     Birth control/protection: Hysterectomy       REVIEW OF SYSTEMS:  Review of Systems   Constitutional:  Negative for fatigue and unexpected weight loss.        Manages her ADLs to include chores, errands and driving.  Is up and about, \"most of the time.\" Is again working part-time.  \"I feel great\"   HENT:  Positive for postnasal drip.    Eyes: Negative.    Respiratory: Negative.  Negative for cough and shortness of breath.         Ex cigarette smoker-age 16-present.  Up to 1 pack/day.  States that she is no longer smoking, \"since 3/17/25\".  Is no longer on Wellbutrin.   Cardiovascular: Negative.    Gastrointestinal: Negative.    Endocrine: Negative.    Genitourinary: Negative.    Musculoskeletal: Negative.    Skin: Negative.    Allergic/Immunologic: Negative.    Neurological: Negative.    Hematological: Negative.    Psychiatric/Behavioral: Negative.         /68   Pulse 102   Temp 97.5 °F (36.4 °C) (Temporal)   Resp 18   Ht 157.5 cm (62\")   Wt 65.2 kg (143 lb 11.2 oz)   SpO2 98%   BMI 26.28 kg/m²  Body surface area is 1.66 meters squared.  Pain Score    25 0945   PainSc: 0-No pain     Physical Exam  Vitals and nursing note reviewed.   Constitutional:       Comments: Pleasant, cooperative, slender middle-aged female.  Ambulatory.  ECOG 0.      Has gained 7 pounds since the last visit   HENT:      Head: Normocephalic and atraumatic.   Eyes:      General: No scleral icterus.     Extraocular Movements: Extraocular movements intact.      Pupils: Pupils are equal, round, and reactive to light.   Cardiovascular:      Rate and Rhythm: Normal rate.   Pulmonary:      Effort: Pulmonary effort is normal.      Breath " sounds: No wheezing, rhonchi or rales.      Comments: Port in the right upper chest is well seated    Distant breath sounds bilaterally.    Left thoracotomy and thoracoscopy incisions are well-healed.  Abdominal:      Palpations: Abdomen is soft.      Tenderness: There is no abdominal tenderness.   Musculoskeletal:         General: Normal range of motion.      Cervical back: Normal range of motion.   Lymphadenopathy:      Cervical: No cervical adenopathy.   Skin:     General: Skin is warm.   Neurological:      General: No focal deficit present.      Mental Status: She is alert and oriented to person, place, and time.   Psychiatric:         Mood and Affect: Mood normal.         Behavior: Behavior normal.         Thought Content: Thought content normal.         .Assessment:  1.   Adenocarcinoma of the upper lobe of the left lung.  --Original tumor stage: IIB (cT3, cN0, cM0)   --Pathologic tumor stage: IB (pT2a, pN0)  --Original tumor burden:  -07/31/2024 - CT Chest Low Dose:  Emphysematous changes with a peripheral area of consolidation in the medial aspect of the left upper lobe. This could be secondary to scarring. Comparison with any prior exams would be beneficial to assist stability. If none are available, followed-up PET/CT should be considered for more complete characterization.     -09/16/2024 - PET Scan:  The pleural-based nodule within the anterior LEFT upper lobe shows unchanged size compared with the outside CT exam from 6 weeks ago.  While this could represent focal pneumonia the unchanged size over 6 weeks and the degree of FDG uptake is compatible with a primary lung neoplasm.  No abnormal FDG uptake is seen outside of the chest.  -10/22/2024 - CT Chest without contrast:  Stable irregular 2.6 x 1.3 cm anterior subpleural left upper lobe pulmonary nodule, hypermetabolic on PET/CT of 9/16/2024 concerning for malignancy. No pathologic intrathoracic or axillary lymphadenopathy.  Moderate emphysema.       -10/28/2024 - Bronchoscopy with biopsy per :  Nodule, left upper lobe lung, Ion biopsy (cellblock):  Non-small cell carcinoma, adenocarcinoma consistent with lung primary.  Nodule, left upper lobe lung, Ion fine-needle aspiration (smears and ThinPrep):  Non-small cell carcinoma, adenocarcinoma.  Station 7 lymph node, EBUS (smears and ThinPrep):  No malignant cells identified.  Mixed lymphocytes and histiocytes consistent with lymph node.  Bronchial washing (ThinPrep):  No malignant cells identified.  Benign bronchial epithelial cells, macrophages, and mixed inflammation present.  -11/21/2024 - Appointment with Dr. Beltre:  I reviewed her imaging and I am concerned somewhat of the chest wall invasion.    Given this we plan to get an MRI of the chest to further assess for this ensure that the portion abutting the visceral and parietal pleura is atelectasis and not tumor.    As long as no chest wall invasion she is a good candidate for robotic left upper lobectomy.  I discussed treatment options with her and we agree that this is the best treatment option for her.  I reviewed her PFTs and she has adequate lung reserve to tolerate anatomic lobectomy.    We discussed preoperative operative postoperative expectations as well as risk and benefits. We discussed the risk and benefits of surgery and alternatives including but not limited to bleeding, infection, injury to major vessels or organs, need for transfusion, need for conversion to open operation, pneumonia, prolonged airleak, risk of anesthesia, and/or death.  She understands these risk and agrees to proceed.  Will obtain MRI of the chest to assess for chest wall invasion if that looks okay plan on proceeding with surgery soon.    -12/05/2024 - MRI Chest with and without contrast:  Redemonstrated left upper lobe lung mass. There is greater than 3 cm tumor contact with the chest wall and there is some loss of the extrapleural fat plane along the anterolateral  aspect of the mass. Findings are suspicious for chest wall invasion, though not definitive.  -12/06/2024 - Documentation by Dr. Beltre's office:  Dr. Beltre has reviewed imaging.  There is concern for chest wall invasion.  He recommends a referral to radiation oncology for radiation therapy and then reassess after completion to determine if surgery is an option.  I have called the patient and discussed this with her and she is agreeable.  Referral placed to radiation oncology.  - 1/2/2025-CMP normal, CEA 2.97, B12 576, folate 16.9, ferritin 343, iron 79, iron saturation 23%, CBC normal  - 1/10/2025-[facility]  gene panel on biopsy specimen, 10/28/2025-PD-L1 expression negative.  Potentially actionable genomic variant: KRAS (FDA approved therapies: Adagrasib, sotorasib).  TMB 6.3 (60th percentile), TOY.  Pertinent negatives: EGFR, BRAF, ALK, ROS1, RET, MET, HER2    --Complications of tumor:  None. Incidental finding    --Tumor status:    -12/16/2024 - Consult Dr. Collazo:  Deana Stack is a 64-year-old female with a good performance status that has an incidentally diagnosed clinical stage IIB (T3 N0 M0) non-small cell carcinoma of the left lung consistent with adenocarcinoma.  She has had a surgical evaluation with recommendations of preoperative chemoradiation therapy in an effort to have an R0 resection for definitive therapy.  I agree with these recommendations and that the patient is a candidate.  We will plan for preoperative chemoradiation therapy per NCCN guidelines.  I anticipate  preoperative chemoradiation therapy using IMRT/IGRT techniques to a total dose of 5400 cGy over 5-6 weeks, final course pending.     - Neoadjuvant carboplatin and paclitaxel-C1, 1/16/2025;C2, 1/23/2025; C3, 1/30/2025; C4, 2/6/2025   - Concurrent radiation -1/16/2025-2/7/2025 (14 fractions).    -  3/18/2025 underwent left thoracotomy with left upper lobectomy and mediastinal lymph node dissection.  Final diagnosis: 1.  Left upper lobe  "lung, lobectomy: Invasive lepidic adenocarcinoma. Maximum tumor diameter is 3.8 cm.The tumor focally involves the visceral pleural surface. The surgical margins are free of tumor. 2.  Level 5 lymph node #1, excision: 1 benign lymph node. 3.  Level 5 lymph node #2, excision: 1 benign lymph node. 4.  Level 6 lymph node by ascending aorta, excision: 1 benign lymph node with hyalinized calcified granulomas. 5.  Level 9 lymph node, excision: 1 benign lymph node. 6.  Level 11 lymph node on pulmonary artery, biopsy: 1 benign lymph node. 7.  Level 10 lymph node on bronchus, excision: 1 benign lymph node. 8.  Level 7 lymph node, excision: 1 benign lymph node.  Pathologic stage classification: pT2a,pN0    2.   Ex tobacco smoker-24 pack years.  Quit smoking 3/17/2025.  \"I still get cravings\"  3.   COPD  4.   Bronchiectasis    Plan:  1.   Pathology from left thoracotomy and left upper lobectomy, 3/18/2025 (above) noted. pTNM-IB (pT2a,pN0)  2.   Chemo tolerance discussed.  Residual fatigue otherwise no inordinate toxicities.    3.   Review baseline labs, 1/2/2025-CMP normal, CEA 2.97, B12 576, folate 16.9, ferritin 343, iron 79, iron saturation 23%, CBC normal  4.   Review [facility] XT from biopsy specimen, 10/28/2024: PD-L1 expression negative.  Potentially actionable genomic variant: KRAS (FDA approved therapies: Adagrasib, sotorasib).  TMB 6.3 (60th percentile), TOY.  Pertinent negatives: EGFR, BRAF, ALK, ROS1, RET, MET, HER2  5.   Note negative brain MRI, 1/31/2025  6.   NCCN guideline 3.2025 non-small cell lung cancer.  Findings at surgery: Stage Ib ([id number])-margins negative (R0)-adjuvant treatment: Observe OR adjuvant systemic therapy for T2a tumors= 4 cm-surveillance after completion of definitive therapy: Stage I-II (primary treatment included surgery+/- chemotherapy) H&P and chest CT+/- contrast every 6 months for 2-3 years then H&P and low-dose noncontrast enhanced chest CT annually.  7.   NCCN guidelines 1.2025 " -non-small cell lung cancer- clinical presentation: Chest wall, proximal airway or mediastinum (T3 invasion, N0-1; resectable; T4 invasion, N0-1)-initial treatment- surgery or preoperative systemic therapy or concurrent chemoradiation - surgical reevaluation (including chest CT+/- PET scan). Adjuvant treatment: Chemotherapy+ atezolizumab or pembrolizumab or osimertinib if R0 (margins negative).  If margins positive: Reresection+ chemotherapy or chemoradiation (R1/R2).  Resection+ RT boost then adjuvant systemic therapy- surveillance     8.  CT chest with IV contrast in 11 weeks   9.  Port flush q 8 weeks  10. Return to office in 12 weeks with preoffice CMP, CBC w diff, CEA  11.  Importance of Smoking Cessation discussed with patient and informed patient additional information will be on today's [facility]'s Flyer - Plan to Be Tobacco Free handout provided to patient            I spent ~74 minutes caring for Deana on this date of service. This time includes time spent by me in the following activities: preparing for the visit, reviewing tests, performing a medically appropriate examination and/or evaluation, counseling and educating the patient/family/caregiver, ordering medications, tests, or procedures and documenting information in the medical record.

## 2025-04-24 ENCOUNTER — OFFICE VISIT (OUTPATIENT)
Dept: ONCOLOGY | Facility: CLINIC | Age: 65
End: 2025-04-24
Payer: MEDICARE

## 2025-04-24 VITALS
OXYGEN SATURATION: 98 % | SYSTOLIC BLOOD PRESSURE: 116 MMHG | DIASTOLIC BLOOD PRESSURE: 68 MMHG | RESPIRATION RATE: 18 BRPM | TEMPERATURE: 97.5 F | HEIGHT: 62 IN | BODY MASS INDEX: 26.44 KG/M2 | HEART RATE: 102 BPM | WEIGHT: 143.7 LBS

## 2025-04-24 DIAGNOSIS — C34.12 ADENOCARCINOMA OF UPPER LOBE OF LEFT LUNG: Primary | ICD-10-CM

## 2025-04-30 PROBLEM — Z92.3 HISTORY OF RADIATION THERAPY: Status: ACTIVE | Noted: 2025-04-30

## 2025-04-30 NOTE — PROGRESS NOTES
[facility]  Radiation Oncology Clinic   Sushant Collazo MD, FACR  Rei WILKINSON  _______________________________________________  [facility]  Department of Radiation Oncology  [address] [place] [id number]  Office: [phone]  Fax: [phone]    DATE: 05/05/2025  PATIENT: Deana Stack  1960                         MEDICAL RECORD #: [id number]    1. History of primary non-small cell carcinoma of left lung    2. History of radiation therapy    3. History of lobectomy of lung    4. Former smoker                                               REASON FOR VISIT:    Chief Complaint   Patient presents with    Lung Cancer     Reason for Follow up Visit:  Deana Stack s a very pleasant 65 y.o. patient that completed radiation to the lung and returns to the clinic today for inital follow up exam..    History of Present Illness:  Diagnosed with Stage IIB (T3 N0 M0) non-small cell carcinoma of the left lung consistent with adenocarcinoma. He completed 5400 cGy in 27 fractions to the left lung on 02/06/2025.     07/31/2024 - CT Chest Low Dose:  Emphysematous changes with a peripheral area of consolidation in the medial aspect of the left upper lobe. This could be secondary to scarring. Comparison with any prior exams would be beneficial to assist stability. If none are available, followed-up PET/CT should be considered for more complete characterization.    09/16/2024 - PET Scan:  The pleural-based nodule within the anterior LEFT upper lobe shows unchanged size compared with the outside CT exam from 6 weeks ago.  While this could represent focal pneumonia the unchanged size over 6 weeks and the degree of FDG uptake is compatible with a primary lung neoplasm.  No abnormal FDG uptake is seen outside of the chest.    10/14/2024 - Appointment with JAJA Deluna:  Left upper lobe pulmonary nodule (Primary)  schedule for Ion robotic bronchoscopy  with Dr. Dewitt. Risks and benefits discussed. She is not on any blood thinners.  Follow up:  Plan as above. Office follow up in a few weeks post procedure. Call sooner if needed.     10/22/2024 - CT Chest without contrast:  Stable irregular 2.6 x 1.3 cm anterior subpleural left upper lobe pulmonary nodule, hypermetabolic on PET/CT of 2024 concerning for malignancy. No pathologic intrathoracic or axillary lymphadenopathy.  Moderate emphysema.     10/28/2024 - Bronchoscopy with biopsy per :  Nodule, left upper lobe lung, Ion biopsy (cellblock):  Non-small cell carcinoma, adenocarcinoma consistent with lung primary.  Nodule, left upper lobe lung, Ion fine-needle aspiration (smears and ThinPrep):  Non-small cell carcinoma, adenocarcinoma.  Station 7 lymph node, EBUS (smears and ThinPrep):  No malignant cells identified.  Mixed lymphocytes and histiocytes consistent with lymph node.  Bronchial washing (ThinPrep):  No malignant cells identified.  Benign bronchial epithelial cells, macrophages, and mixed inflammation present.    2024 - Appointment with JAJA Deluna:  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  We reviewed cytology from recent ion. CTS referral orders have been placed.   Follow up:  Follow up in 3 months     2024 - Pulmonary Function Tests:  Pre Drug % Predicted   FVC: 106%  FEV1: 87%  FEF 25-75%: 46%  FEV1/FVC: 66.44%  T%  RV: 191%  DLCO: 91%  D/VAsb: 71%     Interpretation Spirometry   Spirometry shows mild obstruction. There is reduced midflow suggesting small airway/airflow obstruction.   Review of FVL curve   Patient's effort is normal.   Lung Volume Measurements  Measurements show elevated residual volume consistent with gas trapping and elevated TLC consistent with hyperventilation.   Diffusion Capacity  The patient's diffusion capacity is normal.  Diffusion capacity is normal when corrected for alveolar volume.     2024 - Appointment with Dr. Beltre:  I  reviewed her imaging and I am concerned somewhat of the chest wall invasion.    Given this we plan to get an MRI of the chest to further assess for this ensure that the portion abutting the visceral and parietal pleura is atelectasis and not tumor.    As long as no chest wall invasion she is a good candidate for robotic left upper lobectomy.  I discussed treatment options with her and we agree that this is the best treatment option for her.  I reviewed her PFTs and she has adequate lung reserve to tolerate anatomic lobectomy.    We discussed preoperative operative postoperative expectations as well as risk and benefits. We discussed the risk and benefits of surgery and alternatives including but not limited to bleeding, infection, injury to major vessels or organs, need for transfusion, need for conversion to open operation, pneumonia, prolonged airleak, risk of anesthesia, and/or death.  She understands these risk and agrees to proceed.  Will obtain MRI of the chest to assess for chest wall invasion if that looks okay plan on proceeding with surgery soon.    Thank you for trusting me the care of Ms. Stack.  Please do not hesitate to call questions or concerns.     12/05/2024 - MRI Chest with and without contrast:  Redemonstrated left upper lobe lung mass. There is greater than 3 cm tumor contact with the chest wall and there is some loss of the extrapleural fat plane along the anterolateral aspect of the mass. Findings are suspicious for chest wall invasion, though not definitive.    12/06/2024 - Documentation by Dr. Beltre's office:  Dr. Beltre has reviewed imaging.  There is concern for chest wall invasion.  He recommends a referral to radiation oncology for radiation therapy and then reassess after completion to determine if surgery is an option.  I have called the patient and discussed this with her and she is agreeable.  Referral placed to Dr. Duong.     12/16/2024 - Consult with :  Following this  discussion and in consideration of the diagnostic data/evaluation of the patient, I recommended a course of external beam radiation, likely delivered concurrently with systemic therapy under the medical oncology service, I anticipate  preoperative chemoradiation therapy using IMRT/IGRT techniques to a total dose of 5400 cGy over 5-6 weeks, final course pending.  Continue ongoing management per primary care physician and other specialists.   Plan:  Follow-up with referral to medical oncology to consider preop concurrent chemoradiation.    12/30/2024 - 02/06/2025 - Completed radiation course:  Received 5400 cGy in 27 fractions to the left lung.     01/02/2025 - Consult with :  Plan:  Review available information as it pertains to the lung neoplasm (above), including CT chest, PET scan, MRI and Ion bronchoscopy showing adenocarcinoma upper lobe of left lung  Send biopsy specimen for [facility] lung panel (PD-L1, ALK, ROS1, EGFR, BRAF, MEK, NTRK, etc)  Schedule brain MRI  Review consults from Dr. Beltre (CT surgery) and Dr. Collazo (radiation oncology).  She has had a surgical evaluation with recommendations of preoperative chemoradiation therapy in an effort to have an R0 resection for definitive therapy.  We will plan for preoperative chemoradiation therapy per NCCN guidelines.  I anticipate  preoperative chemoradiation therapy using IMRT/IGRT techniques to a total dose of 5400 cGy over 5-6 weeks,  Draw baseline CBC w diff, fe, fe sat, ferritin, B12, folate, CEA, CMP  Discussed with Dr. Collazo.  Will plan on concurrent neoadjuvant chemo+IO followed by surgery  NCCN guidelines 1.2025 -non-small cell lung cancer- clinical presentation: Chest wall, proximal airway or mediastinum (T3 invasion, N0-1; resectable; T4 invasion, N0-1)-initial treatment- surgery or preoperative systemic therapy or concurrent chemoradiation - surgical reevaluation (including chest CT+/- PET scan). Adjuvant treatment: Chemotherapy+ atezolizumab  or pembrolizumab or osimertinib if R0 (margins negative).  If margins positive: Reresection+ chemotherapy or chemoradiation (R1/R2).  Resection+ RT boost then adjuvant systemic therapy- surveillance  Teaching sheets for carboplatin, paclitaxel   Re: Discussed the potential toxicities of paclitaxel (to include but not limited to: Myelosuppression, opportunistic infection, neuropathy, hypersensitivity reaction, anaphylaxis, cardiac conduction disturbance, bradycardia, arrhythmias, hypothyroidism, syncope, hypertension, thromboembolism, myocardial infarction, severe injection site reaction, pulmonary toxicity, neurotoxicity, GI obstruction/perforation, paralytic ileus, ischemic colitis, pancreatitis, hepatotoxicity, severe skin reaction, febrile neutropenia, alopecia, arthralgias/myalgias, nausea/vomiting, diarrhea, mucositis, asthenia, bleeding, bradycardia, edema). Questions answered.  She will agree to a trial of therapy.    Re: Discussed potential toxicities of carboplatin (to include but not limited to: Anaphylactic reaction, nephrotoxicity, myelosuppression, O2 toxicity, neuropathy, nausea/ vomiting, vision loss, severe hypokalemia, hyponatremia, hypocalcemia, hypomagnesemia, elevated liver enzymes, pain, asthenia, paresthesias, abdominal pain, diarrhea, constipation, mucositis, bleeding, alopecia, injection site reaction).  Questions answered.  She will agree to a trial of therapy.  Schedule treatment (plan: weekly x 9-9-[id number] with RT)- C1, 1/8/2024; C2, 1/15/2024; C3, 1/15/2024; C4, etc   paclitaxel 45 mg/m2 per administration guidelines   Carboplatin AUC 2 per administration guidelines.  Premedicate with:   Aloxi 0.25 mg IV before chemo   Emend 150 mg IV before chemo  Decadron 10 mg p.o. IV before chemo   Pepcid 20 mg IV   Benadryl 50 mg IV  CMP and CBC on days of therapy. Hold if creatinine > 1.5, total bilirubin > 1.5, AST/ALT > 3x ULN, TSH <0.5 or > 2. Administer Procrit 40,000 units  subcutaneously if hemoglobin less than 10 or hematocrit less than 30. Neupogen 480 mcg sc x 3 days if ANC < 1.0  Refer to general surgery Re: Port placement  eRx:   Zofran 8 mg p.o. 3 times daily as needed, #30 - Rx  Return to office in 6 weeks with preoffice CMP, CBC w diff  Importance of Smoking Cessation discussed with patient and informed patient additional information will be on today's AVS.    01/14/2025 - Port placement per Osvaldo Lemus MD.    01/31/2025 - MRI Brain with and without contrast:  Unremarkable brain MRI with contrast. No intracranial metastatic disease.    02/05/2025 - Appointment with Marilyn Reyes APRN:  Stage 1 mild COPD by GOLD classification (Primary)  No indication for inhaler need at this time.  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  Following with medical oncology and radiation oncology.  Finishing chemo tomorrow and will follow back up with CT surgery.  Oncology ordering surveillance scans  Current every day smoker  Has cut back to smoking 2 cigarettes a day.  Cessation recommended.  Follow up:   Plan as above.    Office follow-up in 6 months or sooner if needed.    03/11/2025 - CT Chest without contrast:  Slight interval decrease in size of the pleural-based mass in the anterior left upper chest, currently measuring approximately 1.9 x 1.0 cm, with surrounding infiltrate or inflammation. The mass previously measured approximately 2.4 x 1.2 cm. The decrease in size probably indicates positive treatment response.  No evidence of intrathoracic lymphadenopathy.  Small new vague groundglass nodule in the right upper lobe, measuring 1.2 cm. Follow-up chest CT is recommended in 6 months for this finding.  Interval insertion of right subclavian port catheter, in good Position.    03/11/2025 - PET Scan:  Interval decrease in size of the pleural-based mass in the anterior left upper hemithorax compatible with positive treatment response, this mass currently measuring approximately  1.9 x 1.0 cm, compared with 2.4 x 1.2 cm on the previous PET/CT. There is residual FDG avidity associated with the pleural-based mass, activity is actually slightly greater, this may represent paradoxical increased activity related to interval treatment.  There is a new groundglass nodule in the right upper lobe measures 1.2 cm, with minimal FDG activity, below background. This is unlikely to represent a metastatic focus. Small focal area of inflammation or infection could have this appearance. This finding can be reevaluated on follow-up chest CT.    03/13/2025 - Appointment with :  Ms. Stack is a 64-year-old female she follows up with me after having neoadjuvant therapy for an adenocarcinoma left upper lobe that likely involves left chest wall.  She has had positive treatment response with much smaller appearing mass but it still does appear to abut and possibly involve the left anterior chest wall.  She continues to smoke but is trying hard to quit.  There is no evidence of metastatic disease on repeat imaging.  Discussed with the patient and her  today the natural history of lung cancer and patient such as her.  I would recommend surgical resection including chest wall resection to obtain adequate margin.  We discussed this occurring through open thoracotomy in order to allow adequate chest wall resection.  I discussed with her operative expectations of this as well as risk and benefits, she understands and wants to proceed. We discussed the risk and benefits of surgery and alternatives including but not limited to bleeding, infection, injury to major vessels or organs, need for transfusion, need for conversion to open operation, pneumonia, prolonged airleak, risk of anesthesia, and/or death.  She understands these risk and agrees to proceed.  I have reviewed her PFT she has adequate lung reserve to tolerate anatomic lobectomy.  We will plan on surgery next week.  Thank you for trusting me with  the care of Ms. Stack.  Please not hesitate to call questions or concerns.    03/18/2025 - Left lobectomy and lymph node excision per :  Left upper lobe lung, lobectomy:  Invasive lepidic adenocarcinoma.  Maximum tumor diameter is 3.8 cm.  The tumor focally involves the visceral pleural surface.  The surgical margins are free of tumor.  Level 5 lymph node #1, excision:  1 benign lymph node.  Level 5 lymph node #2, excision:  1 benign lymph node.  Level 6 lymph node by ascending aorta, excision:  1 benign lymph node with hyalinized calcified granulomas.  Level 9 lymph node, excision:  1 benign lymph node.  Level 11 lymph node on pulmonary artery, biopsy:  1 benign lymph node.  Level 10 lymph node on bronchus, excision:  1 benign lymph node.  Level 7 lymph node, excision:  1 benign lymph node.    Synoptic Checklist   LUNG   8th Edition - Protocol posted: 9/21/2022LUNG - All Specimens  SPECIMEN   Procedure  Lobectomy   Specimen Laterality  Left   TUMOR   Tumor Focality  Single focus   Tumor Site  Upper lobe of lung   Tumor Size     Total Tumor Size (size of entire tumor)  Greatest Dimension (Centimeters): 3.8 cm   Size of Invasive Component  Greatest Dimension (Centimeters): 3.8 cm   Histologic Type  Invasive lepidic adenocarcinoma   Histologic Patterns Present  Acinar: 25     Lepidic: 75   Histologic Grade  G2, moderately differentiated   Visceral Pleura Invasion  Present   Direct Invasion of Adjacent Structures  Not applicable (no adjacent structures present)   Treatment Effect  No known presurgical therapy   Lymphovascular Invasion  Not identified   MARGINS   Margin Status for Invasive Carcinoma  All margins negative for invasive carcinoma   Closest Margin(s) to Invasive Carcinoma  Bronchial     Parenchymal   Distance from Invasive Carcinoma to Closest Margin  At least: 3.8  cm   Margin Status for Non-Invasive Tumor  All margins negative for non-invasive tumor   REGIONAL LYMPH NODES   Lymph Node(s) from  Prior Procedures  Not included   Regional Lymph Node Status  All regional lymph nodes negative for tumor   Number of Lymph Nodes Examined  7   Dusty Site(s) Examined  7: Subcarinal     5: Subaortic / aortopulmonary (AP) / AP window     6: Para-aortic (ascending aorta or phrenic)     9L: Pulmonary ligament     10L: Hilar     11L: Interlobar   PATHOLOGIC STAGE CLASSIFICATION (pTNM, AJCC 8th Edition)   Reporting of pT, pN, and (when applicable) pM categories is based on information available to the pathologist at the time the report is issued. As per the AJCC (Chapter 1, 8th Ed.) it is the managing physician's responsibility to establish the final pathologic stage based upon all pertinent information, including but potentially not limited to this pathology report.   pT Category  pT2a   pN Category  pN0   ADDITIONAL FINDINGS   Additional Findings  Emphysema        03/27/2025 - Appointment with Renate Tavares APRN:  Overall, Deana Stack is doing well.  Pathology results reviewed with patient and her .   She is going to call Dr. Becerra's office to schedule sooner follow-up.    Following post op thoracic surgery home instructions.   Provided support and encouragement.   All questions have been answered to the best of my ability.    Patient has been instructed to contact our office with any questions or concerns should they arise prior to the next office visit.   Plan on 1 month follow up with Dr. Beltre with repeat chest xray.    Overall very happy with her progress.    04/24/2025 - Appointment with :  Plan:  Pathology from left thoracotomy and left upper lobectomy, 3/18/2025 (above) noted. pTNM-IB (pT2a,pN0)  Chemo tolerance discussed.  Residual fatigue otherwise no inordinate toxicities.    Review baseline labs, 1/2/2025-CMP normal, CEA 2.97, B12 576, folate 16.9, ferritin 343, iron 79, iron saturation 23%, CBC normal  Review [facility] XT from biopsy specimen, 10/28/2024: PD-L1 expression  negative.  Potentially actionable genomic variant: KRAS (FDA approved therapies: Adagrasib, sotorasib).  TMB 6.3 (60th percentile), TOY.  Pertinent negatives: EGFR, BRAF, ALK, ROS1, RET, MET, HER2  Note negative brain MRI, 1/31/2025  NCCN guideline 3.2025 non-small cell lung cancer.  Findings at surgery: Stage Ib ([id number])-margins negative (R0)-adjuvant treatment: Observe OR adjuvant systemic therapy for T2a tumors= 4 cm-surveillance after completion of definitive therapy: Stage I-II (primary treatment included surgery+/- chemotherapy) H&P and chest CT+/- contrast every 6 months for 2-3 years then H&P and low-dose noncontrast enhanced chest CT annually.  NCCN guidelines 1.2025 -non-small cell lung cancer- clinical presentation: Chest wall, proximal airway or mediastinum (T3 invasion, N0-1; resectable; T4 invasion, N0-1)-initial treatment- surgery or preoperative systemic therapy or concurrent chemoradiation - surgical reevaluation (including chest CT+/- PET scan). Adjuvant treatment: Chemotherapy+ atezolizumab or pembrolizumab or osimertinib if R0 (margins negative).  If margins positive: Reresection+ chemotherapy or chemoradiation (R1/R2).  Resection+ RT boost then adjuvant systemic therapy- surveillance  CT chest with IV contrast in 11 weeks   Port flush q 8 weeks  Return to office in 12 weeks with preoffice CMP, CBC w diff, CEA  Importance of Smoking Cessation discussed with patient and informed patient additional information will be on today's [facility]'s Flyer - Plan to Be Tobacco Free handout provided to patient     05/01/2025 - Chest x-ray:  Postoperative change of left upper lobectomy with expected left-sided volume loss.  Slight increase in left-sided atelectasis compared to 3/27/2025.   No consolidation, large pleural effusion, or pneumothorax.    05/01/2025 - Appointment with :  Ms. Stack is a 65-year-old female she returns to me in follow-up 6 weeks after left thoracotomy left  upper lobectomy mediastinal lymph node dissection for non-small cell lung cancer.  Final pathology is pT2a pN0 M0, had visceral pleural invasion.  She had undergone preoperative radiation and chemo therapy for suspected chest wall invasion but findings that surgery did not show chest wall invasion.  She has healed well from surgery, overall very happy with how she is done.  She has followed up with Dr. Becerra on they are going to continue with surveillance no adjuvant chemotherapy.  Overall very happy with how Ms. Stack is done.  She can return to normal activities without restriction.  She does have some minor neuropathic pain mainly late in the day does not want to take any neuropathic pain medicine.  If not improved can call and we can try that therapy later.   Ms. Stack can follow-up with me on as-needed basis.  Thank you for addressing the care of Ms. Stack.  Please do not hesitate to call questions or concerns.    07/31/2025 - Scheduled appointment with .    08/05/2025 - Scheduled appointment with Marilyn Reyes APRN.    History obtained from  PATIENT and CHART    PAST MEDICAL HISTORY  Past Medical History:   Diagnosis Date    Bronchiectasis     GERD (gastroesophageal reflux disease)     Lung cancer 10/2024    GALLO      PAST SURGICAL HISTORY  Past Surgical History:   Procedure Laterality Date    BRONCHOSCOPY WITH ION ROBOTIC ASSIST N/A 10/28/2024    Procedure: BRONCHOSCOPY WITH ION ROBOT;  Surgeon: Donovan Dewitt MD;  Location: [facility] OR;  Service: Robotics - Pulmonary;  Laterality: N/A;  preop; GALLO nodule   postop GALLO nodule   PCP Elena Fitch    CHOLECYSTECTOMY N/A     HYSTERECTOMY      LOBECTOMY Left 3/18/2025    Procedure: LEFT LOBECTOMY WITH CHEST WALL RESECTION;  Surgeon: Jaime Beltre MD;  Location: [facility] OR;  Service: Cardiothoracic;  Laterality: Left;    LUNG BIOPSY      LYMPH NODE DISSECTION N/A 3/18/2025    Procedure: MEDIASTINAL LYMPH NODE DISSECTION;  Surgeon:  Jaime Beltre MD;  Location:  [facility] OR;  Service: Cardiothoracic;  Laterality: N/A;    THORACOTOMY Left 3/18/2025    Procedure: LEFT THORACOTOMY;  Surgeon: Jaime Beltre MD;  Location:  [facility] OR;  Service: Cardiothoracic;  Laterality: Left;    VENOUS ACCESS DEVICE (PORT) INSERTION N/A 2025    Procedure: Single Lumen Port-a-cath insertion with flouroscopy;  Surgeon: Osvaldo Lemus MD;  Location:  [facility] OR;  Service: General;  Laterality: N/A;      FAMILY HISTORY  family history includes Asthma in her sister; Cancer in her mother; Diabetes in her brother, brother, brother, and mother.    SOCIAL HISTORY  Social History     Tobacco Use    Smoking status: Former     Current packs/day: 0.00     Average packs/day: 1 pack/day for 48.2 years (47.9 ttl pk-yrs)     Types: Cigarettes     Start date: 1977     Quit date: 3/15/2025     Years since quittin.1     Passive exposure: Current    Smokeless tobacco: Never   Vaping Use    Vaping status: Never Used   Substance Use Topics    Alcohol use: Never    Drug use: Never     ALLERGIES  Patient has no known allergies.     MEDICATIONS    Current Outpatient Medications:     Cholecalciferol 10 MCG (400 UNIT) tablet, Take 1 tablet by mouth Daily., Disp: , Rfl:     esomeprazole (nexIUM) 20 MG capsule, Take 1 capsule by mouth Every Morning Before Breakfast., Disp: , Rfl:     multivitamin with minerals tablet tablet, Take 1 tablet by mouth Daily., Disp: , Rfl:     varenicline (CHANTIX) 1 MG tablet, Take 1 tablet by mouth Daily., Disp: , Rfl:     vitamin C (ASCORBIC ACID) 250 MG tablet, Take 1 tablet by mouth Daily., Disp: , Rfl:     zinc sulfate (ZINCATE) 220 (50 Zn) MG capsule, Take 1 capsule by mouth Daily., Disp: , Rfl:   No current facility-administered medications for this visit.    Current outpatient and discharge medications have been reconciled for the patient.  Reviewed by: JAJA Bustillos    The following portions of the patient's history were reviewed  "and updated as appropriate: allergies, current medications, past family history, past medical history, past social history, past surgical history and problem list.    REVIEW OF SYSTEMS  Review of Systems   Constitutional:  Negative for appetite change, fatigue and unexpected weight change.   HENT: Negative.     Eyes: Negative.         Glasses   Respiratory:  Negative for cough and shortness of breath.    Cardiovascular:  Positive for chest pain (left sided r/t recent surgery).   Gastrointestinal: Negative.    Endocrine: Negative.    Genitourinary: Negative.    Musculoskeletal: Negative.    Skin: Negative.    Allergic/Immunologic: Negative.    Neurological: Negative.    Hematological: Negative.    Psychiatric/Behavioral: Negative.       PHYSICAL EXAM  VITAL SIGNS:   Vitals:    05/05/25 1053   BP: 132/81   Pulse: 93   SpO2: 99%  Comment: room air   Weight: 65.8 kg (145 lb)   Height: 157.5 cm (62\")   PainSc: 0-No pain       Physical Exam  Vitals reviewed.   Constitutional:       Appearance: Normal appearance.   HENT:      Head: Normocephalic.      Nose: Nose normal.   Eyes:      Pupils: Pupils are equal, round, and reactive to light.   Cardiovascular:      Rate and Rhythm: Normal rate and regular rhythm.      Pulses: Normal pulses.      Heart sounds: Normal heart sounds.   Pulmonary:      Effort: Pulmonary effort is normal. No respiratory distress.      Breath sounds: Normal breath sounds. No wheezing.   Abdominal:      General: Bowel sounds are normal.      Palpations: There is no mass.   Musculoskeletal:         General: Normal range of motion.      Cervical back: Normal range of motion and neck supple. No tenderness.   Lymphadenopathy:      Cervical: No cervical adenopathy.   Skin:     General: Skin is warm and dry.      Capillary Refill: Capillary refill takes less than 2 seconds.   Neurological:      General: No focal deficit present.      Mental Status: She is alert and oriented to person, place, and time.      " Motor: No weakness.   Psychiatric:         Mood and Affect: Mood normal.         Behavior: Behavior normal.          Performance Status: ECOG (0) Fully active, able to carry on all predisease performance without restriction    Clinical Quality Measures  - Pain Documented by Standardized Tool, FPS Deana Stack reports a pain score of 0.  Given her pain assessment as noted, treatment options were discussed and the following options were decided upon as a follow-up plan to address the patient's pain: continuation of current treatment plan for pain.  Pain Medications           - Body Mass Index Screening and Follow-Up Plan   Body mass index is 26.52 kg/m². Defer to PCP.     - Tobacco Use: Screening and Cessation Intervention  Social History    Tobacco Use      Smoking status: Former        Packs/day: 0.00        Years: 1 pack/day for 48.2 years (47.9 ttl pk-yrs)        Types: Cigarettes        Start date: 1977        Quit date: 3/15/2025        Years since quittin.1        Passive exposure: Current      Smokeless tobacco: Never    - Advanced Care Planning Advance Care Planning   ACP discussion was held with the patient during this visit. Patient does not have an advance directive, information provided.    - PHQ-2 Depression Screening  Little interest or pleasure in doing things? Not at all   Feeling down, depressed, or hopeless? Not at all   PHQ-2 Total Score 0     ASSESSMENT AND PLAN  1. History of primary non-small cell carcinoma of left lung    2. History of radiation therapy    3. History of lobectomy of lung    4. Former smoker      No orders of the defined types were placed in this encounter.    RECOMMENDATIONS: Deana Stack is status post completion of radiation therapy to the lung and presents to our clinic today for surveillance exam and to review imaging. 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 He completed 5400 cGy in 27  fractions to the left lung on 02/06/2025.     Chest CT completed on 03/11/2025 revealed slight interval decrease in size of the pleural-based mass in the anterior left upper chest, currently measuring approximately 1.9 x 1.0 cm, with surrounding infiltrate or inflammation. The mass previously measured approximately 2.4 x 1.2 cm. The decrease in size probably indicates positive treatment response. No evidence of intrathoracic lymphadenopathy. Small new vague groundglass nodule in the right upper lobe, measuring 1.2 cm. Follow-up chest CT is recommended in 6 months for this finding. Interval insertion of right subclavian port catheter, in good Position.    PET scan completed on 03/11/2025 revealing interval decrease in size of the pleural-based mass in the anterior left upper hemithorax compatible with positive treatment response, this mass currently measuring approximately 1.9 x 1.0 cm, compared with 2.4 x 1.2 cm on the previous PET/CT. There is residual FDG avidity associated with the pleural-based mass, activity is actually slightly greater, this may represent paradoxical increased activity related to interval treatment. There is a new groundglass nodule in the right upper lobe measures 1.2 cm, with minimal FDG activity, below background. This is unlikely to represent a metastatic focus. Small focal area of inflammation or infection could have this appearance. This finding can be reevaluated on follow-up chest CT.    Left thoracotomy and left upper lobectomy completed on 03/18/2025 per  revealing Invasive lepidic adenocarcinoma.    On exam, I do not see evidence for recurrent or metastatic disease at this time. End of treatment summary and survivorship care plan was given to the patient today.     We will continue routine follow-up/surveillance as discussed in 3 months with follow up CT scan before visit and I have instructed her to continue to see the other health care providers as per their  scheduling.    Patient Instructions   1) return in 3 months    Return in about 3 months (around 8/5/2025).    Time Spent: I spent 42 minutes caring for Deana on this date of service. This time includes time spent by me in the following activities: preparing for the visit, reviewing tests, obtaining and/or reviewing a separately obtained history, performing a medically appropriate examination and/or evaluation, counseling and educating the patient/family/caregiver, ordering medications, tests, or procedures, referring and communicating with other health care professionals, documenting information in the medical record, independently interpreting results and communicating that information with the patient/family/caregiver, and care coordination.   Rei Neal, APRN  05/05/2025

## 2025-05-01 ENCOUNTER — HOSPITAL ENCOUNTER (OUTPATIENT)
Dept: GENERAL RADIOLOGY | Facility: HOSPITAL | Age: 65
Discharge: HOME | End: 2025-05-01
Admitting: NURSE PRACTITIONER
Payer: MEDICARE

## 2025-05-01 ENCOUNTER — OFFICE VISIT (OUTPATIENT)
Dept: CARDIAC SURGERY | Facility: CLINIC | Age: 65
End: 2025-05-01
Payer: MEDICARE

## 2025-05-01 VITALS
DIASTOLIC BLOOD PRESSURE: 78 MMHG | WEIGHT: 142.6 LBS | BODY MASS INDEX: 26.24 KG/M2 | HEIGHT: 62 IN | OXYGEN SATURATION: 96 % | SYSTOLIC BLOOD PRESSURE: 122 MMHG | HEART RATE: 89 BPM

## 2025-05-01 DIAGNOSIS — C34.12 MALIGNANT NEOPLASM OF UPPER LOBE OF LEFT LUNG: Primary | ICD-10-CM

## 2025-05-01 DIAGNOSIS — C34.12 ADENOCARCINOMA OF UPPER LOBE OF LEFT LUNG: ICD-10-CM

## 2025-05-01 PROCEDURE — 1160F RVW MEDS BY RX/DR IN RCRD: CPT | Performed by: SURGERY

## 2025-05-01 PROCEDURE — 99024 POSTOP FOLLOW-UP VISIT: CPT | Performed by: SURGERY

## 2025-05-01 PROCEDURE — 1159F MED LIST DOCD IN RCRD: CPT | Performed by: SURGERY

## 2025-05-01 PROCEDURE — 71046 X-RAY EXAM CHEST 2 VIEWS: CPT

## 2025-05-01 NOTE — LETTER
May 1, 2025     JAJA Daugherty  [address] [place] [address]    Patient: Deana Stack   YOB: 1960   Date of Visit: 5/1/2025       Dear JAJA Daugherty,    Deana Stack was in my office today. Below are the relevant portions of my assessment and plan of care.    Ms. Stack is a 65-year-old female she returns to me in follow-up 6 weeks after left thoracotomy left upper lobectomy mediastinal lymph node dissection for non-small cell lung cancer.  Final pathology is pT2a pN0 M0, had visceral pleural invasion.  She had undergone preoperative radiation and chemo therapy for suspected chest wall invasion but findings that surgery did not show chest wall invasion.  She has healed well from surgery, overall very happy with how she is done.  She has followed up with Dr. Becerra on they are going to continue with surveillance no adjuvant chemotherapy.    Overall very happy with how Ms. Stack is done.  She can return to normal activities without restriction.  She does have some minor neuropathic pain mainly late in the day does not want to take any neuropathic pain medicine.  If not improved can call and we can try that therapy later.    Ms. Stack can follow-up with me on as-needed basis.  Thank you for addressing the care of Ms. Stack.  Please do not hesitate to call questions or concerns.         Sincerely,        Jaime Beltre MD        CC: Mitchel Tello MD

## 2025-05-01 NOTE — PROGRESS NOTES
"  [facility] Cardiothoracic Surgery  PROGRESS NOTE      Procedure Performed: Left Thoracotomy with Left Upper Lobectomy, MLND  Date of Procedure: 3/18/2025      History of Present Illness  The patient presents for evaluation of lung cancer.    She has been experiencing a sensation in her chest, which she describes as feeling like a weight of 500 pounds on her breast, particularly in the evenings. She also reports discomfort in the area where a small piece of her rib was removed. She has been managing these symptoms with the application of a hot pad and ice pack. She has a history of smoking but has since quit. Her treatment plan includes a CT scan every 3 months, with the next one scheduled for 07/2025. Currently, she is not undergoing chemotherapy. She has an upcoming appointment with Dr. Hawkins on Monday for a port flush, as the removal of the port has been deferred for at least a year.    SOCIAL HISTORY  She does not smoke.    FAMILY HISTORY  She has a family history of lung cancer, with several relatives having succumbed to the disease.       Objective:      05/01/25  0929   Weight: 64.7 kg (142 lb 9.6 oz)        PE:  Visit Vitals  /78   Pulse 89   Ht 157.5 cm (62\")   Wt 64.7 kg (142 lb 9.6 oz)   SpO2 96%   BMI 26.08 kg/m²       GENERAL: NAD, resting comfortably, normal palor  CARDIOVASCULAR: regular, regular rate, well healed sternotomy with stable sternum  PULMONARY: Normal bilateral breath sounds, no labored breathing  ABDOMEN: soft, nt/nd  EXTREMITIES: mild peripheral edema, normal pulses, normal ROM                 Lab Results (last 72 hours)      ** No results found for the last 72 hours. **           I personally viewed chest x-ray the following is my interpretation:  Expected postoperative findings with resultant volume loss in the left and appropriate elevation of diaphragm and mediastinal shift     Path:  Final Diagnosis   1.  Left upper lobe lung, lobectomy:  Invasive lepidic " adenocarcinoma.  Maximum tumor diameter is 3.8 cm.  The tumor focally involves the visceral pleural surface.  The surgical margins are free of tumor.     2.  Level 5 lymph node #1, excision:  1 benign lymph node.     3.  Level 5 lymph node #2, excision:  1 benign lymph node.     4.  Level 6 lymph node by ascending aorta, excision:  1 benign lymph node with hyalinized calcified granulomas.     5.  Level 9 lymph node, excision:  1 benign lymph node.     6.  Level 11 lymph node on pulmonary artery, biopsy:  1 benign lymph node.     7.  Level 10 lymph node on bronchus, excision:  1 benign lymph node.     8.  Level 7 lymph node, excision:  1 benign lymph node.     Assessment/Plan      Ms. Stack is a 65-year-old female she returns to me in follow-up 6 weeks after left thoracotomy left upper lobectomy mediastinal lymph node dissection for non-small cell lung cancer.  Final pathology is pT2a pN0 M0, had visceral pleural invasion.  She had undergone preoperative radiation and chemo therapy for suspected chest wall invasion but findings that surgery did not show chest wall invasion.  She has healed well from surgery, overall very happy with how she is done.  She has followed up with Dr. Becerra on they are going to continue with surveillance no adjuvant chemotherapy.    Overall very happy with how Ms. Stack is done.  She can return to normal activities without restriction.  She does have some minor neuropathic pain mainly late in the day does not want to take any neuropathic pain medicine.  If not improved can call and we can try that therapy later.    Ms. Stack can follow-up with me on as-needed basis.  Thank you for addressing the care of Ms. Stack.  Please do not hesitate to call questions or concerns.         Jaime Beltre MD   Cardiothoracic Surgeon      Patient or patient representative verbalized consent for the use of Ambient Listening during the visit with  Jaime Beltre MD for chart documentation. 5/1/2025   09:50 CDT  Answers submitted by the patient for this visit:  Post Operative Visit (Submitted on 4/29/2025)  Chief Complaint: Follow-up  Pain Control: not requiring pain medication  Fever: no fever  Diet: adequate intake  Activity: returning to normal  Operative Site Issues: No  Additional information: Just driving me crazy with pulling, and sharp pains in my side once in awhile.

## 2025-05-02 ENCOUNTER — HOSPITAL ENCOUNTER (OUTPATIENT)
Dept: RADIATION ONCOLOGY | Facility: HOSPITAL | Age: 65
Setting detail: RADIATION/ONCOLOGY SERIES
End: 2025-05-02
Payer: MEDICARE

## 2025-05-05 ENCOUNTER — OFFICE VISIT (OUTPATIENT)
Age: 65
End: 2025-05-05
Payer: MEDICARE

## 2025-05-05 ENCOUNTER — INFUSION (OUTPATIENT)
Dept: ONCOLOGY | Facility: CLINIC | Age: 65
End: 2025-05-05
Payer: MEDICARE

## 2025-05-05 VITALS
BODY MASS INDEX: 26.68 KG/M2 | HEART RATE: 93 BPM | OXYGEN SATURATION: 99 % | DIASTOLIC BLOOD PRESSURE: 81 MMHG | HEIGHT: 62 IN | WEIGHT: 145 LBS | SYSTOLIC BLOOD PRESSURE: 132 MMHG

## 2025-05-05 DIAGNOSIS — Z45.2 ENCOUNTER FOR CARE RELATED TO PORT-A-CATH: Primary | ICD-10-CM

## 2025-05-05 DIAGNOSIS — Z90.2 HISTORY OF LOBECTOMY OF LUNG: ICD-10-CM

## 2025-05-05 DIAGNOSIS — Z92.3 HISTORY OF RADIATION THERAPY: ICD-10-CM

## 2025-05-05 DIAGNOSIS — Z85.118 HISTORY OF PRIMARY NON-SMALL CELL CARCINOMA OF LEFT LUNG: Primary | ICD-10-CM

## 2025-05-05 DIAGNOSIS — Z87.891 FORMER SMOKER: ICD-10-CM

## 2025-05-05 PROCEDURE — 1126F AMNT PAIN NOTED NONE PRSNT: CPT

## 2025-05-05 PROCEDURE — 1160F RVW MEDS BY RX/DR IN RCRD: CPT

## 2025-05-05 PROCEDURE — G0463 HOSPITAL OUTPT CLINIC VISIT: HCPCS | Performed by: RADIOLOGY

## 2025-05-05 PROCEDURE — G2211 COMPLEX E/M VISIT ADD ON: HCPCS

## 2025-05-05 PROCEDURE — 99215 OFFICE O/P EST HI 40 MIN: CPT

## 2025-05-05 PROCEDURE — 1159F MED LIST DOCD IN RCRD: CPT

## 2025-05-05 RX ORDER — SODIUM CHLORIDE 0.9 % (FLUSH) 0.9 %
10 SYRINGE (ML) INJECTION AS NEEDED
Status: DISCONTINUED | OUTPATIENT
Start: 2025-05-05 | End: 2025-05-05 | Stop reason: HOSPADM

## 2025-05-05 RX ORDER — SODIUM CHLORIDE 0.9 % (FLUSH) 0.9 %
10 SYRINGE (ML) INJECTION AS NEEDED
OUTPATIENT
Start: 2025-05-05

## 2025-05-05 RX ORDER — HEPARIN SODIUM (PORCINE) LOCK FLUSH IV SOLN 100 UNIT/ML 100 UNIT/ML
500 SOLUTION INTRAVENOUS AS NEEDED
Status: DISCONTINUED | OUTPATIENT
Start: 2025-05-05 | End: 2025-05-05 | Stop reason: HOSPADM

## 2025-05-05 RX ORDER — HEPARIN SODIUM (PORCINE) LOCK FLUSH IV SOLN 100 UNIT/ML 100 UNIT/ML
500 SOLUTION INTRAVENOUS AS NEEDED
OUTPATIENT
Start: 2025-05-05

## 2025-05-05 RX ADMIN — Medication 10 ML: at 10:22

## 2025-05-05 RX ADMIN — HEPARIN SODIUM (PORCINE) LOCK FLUSH IV SOLN 100 UNIT/ML 500 UNITS: 100 SOLUTION at 10:22

## 2025-05-08 ENCOUNTER — TELEPHONE (OUTPATIENT)
Dept: CARDIAC SURGERY | Facility: CLINIC | Age: 65
End: 2025-05-08

## 2025-05-08 DIAGNOSIS — M79.2 NEUROPATHIC PAIN: Primary | ICD-10-CM

## 2025-05-08 RX ORDER — PREGABALIN 25 MG/1
25 CAPSULE ORAL 2 TIMES DAILY
Qty: 28 CAPSULE | Refills: 0 | Status: SHIPPED | OUTPATIENT
Start: 2025-05-08 | End: 2025-05-22

## 2025-05-08 NOTE — TELEPHONE ENCOUNTER
Caller: Deana Stack    Relationship: Self    Best call back number: [phone]     What is the best time to reach you: ANY     Who are you requesting to speak with (clinical staff, provider,  specific staff member): CLINICAL     What was the call regarding: PER LAST OFFICE NOT PATIENT DISCUSSED NEUROPATHIC MEDICATION AND DECLINED.    PATIENT STATES THAT THE SHE HAS CHANGED HER MIND DUE TO PAIN BEING VERY UNCOMFORTABLE.       Is it okay if the provider responds through MyChart: PATIENT WOULD LIKE A CALL BACK.

## 2025-07-24 NOTE — PROGRESS NOTES
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  She was seen by Dr. Beltre of CT surgery in consideration of lobectomy.  MRI chest showed 3 cm contact with the chest wall.  Thus the patient has received preoperative/neoadjuvant carboplatin + paclitaxel -C1, 1/16/2025;C2, 1/23/2025; C3, 1/30/2025; C4, 2/6/2025 with concurrent radiation beginning 1/16/2025-2/7/2025 (14 fractions).  Subsequent CT of the chest showed anterior left upper lobe mass smaller in size with appropriate treatment response after radiation still involving the chest wall but no evidence of metastatic disease.  On 3/18/2025 (4.5 mo) underwent left thoracotomy with left upper lobectomy and mediastinal lymph node dissection.  Pathologic tumor stage: IB ([id number]). She is accompanied by a friend, Clementina (previously with her spouse, Feli).    I have reviewed the HPI and verified with the patient the accuracy of it. No changes to interval history since the information was documented. Mitchel Tello MD 07/31/25      Diagnostic abnormalities:  - Bronchiectasis, GERD, current everyday smoker, and recently diagnosed lung cancer.  -07/31/2024 - CT Chest Low Dose:  Emphysematous changes with a peripheral area of consolidation in the medial aspect of the left upper lobe. This could be secondary to scarring. Comparison with any prior exams would be beneficial to assist stability. If none are available, followed-up PET/CT should be considered for more complete characterization.     -09/16/2024 - PET Scan:  The pleural-based nodule within the anterior LEFT upper lobe shows  unchanged size compared with the outside CT exam from 6 weeks ago.  While this could represent focal pneumonia the unchanged size over 6 weeks and the degree of FDG uptake is compatible with a primary lung neoplasm.  No abnormal FDG uptake is seen outside of the chest.     -10/14/2024 - Appointment with JAJA Deluna:  Left upper lobe pulmonary nodule (Primary)  schedule for Ion robotic bronchoscopy with Dr. Dewitt. Risks and benefits discussed. She is not on any blood thinners.  Follow up:  Plan as above. Office follow up in a few weeks post procedure. Call sooner if needed.      -10/22/2024 - CT Chest without contrast:  Stable irregular 2.6 x 1.3 cm anterior subpleural left upper lobe pulmonary nodule, hypermetabolic on PET/CT of 2024 concerning for malignancy. No pathologic intrathoracic or axillary lymphadenopathy.  Moderate emphysema.      -10/28/2024 - Bronchoscopy with biopsy per :  Nodule, left upper lobe lung, Ion biopsy (cellblock):  Non-small cell carcinoma, adenocarcinoma consistent with lung primary.  Nodule, left upper lobe lung, Ion fine-needle aspiration (smears and ThinPrep):  Non-small cell carcinoma, adenocarcinoma.  Station 7 lymph node, EBUS (smears and ThinPrep):  No malignant cells identified.  Mixed lymphocytes and histiocytes consistent with lymph node.  Bronchial washing (ThinPrep):  No malignant cells identified.  Benign bronchial epithelial cells, macrophages, and mixed inflammation present.     -2024 - Appointment with JAJA Deluna:  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  We reviewed cytology from recent ion. CTS referral orders have been placed.   Follow up:  Follow up in 3 months      -2024 - Pulmonary Function Tests:  Pre Drug % Predicted   FVC: 106%  FEV1: 87%  FEF 25-75%: 46%  FEV1/FVC: 66.44%  T%  RV: 191%  DLCO: 91%  D/VAsb: 71%     Interpretation Spirometry   Spirometry shows mild obstruction. There is reduced midflow suggesting  small airway/airflow obstruction.   Review of FVL curve   Patient's effort is normal.   Lung Volume Measurements  Measurements show elevated residual volume consistent with gas trapping and elevated TLC consistent with hyperventilation.   Diffusion Capacity  The patient's diffusion capacity is normal.  Diffusion capacity is normal when corrected for alveolar volume.      -11/21/2024 - Appointment with Dr. Beltre:  I reviewed her imaging and I am concerned somewhat of the chest wall invasion.    Given this we plan to get an MRI of the chest to further assess for this ensure that the portion abutting the visceral and parietal pleura is atelectasis and not tumor.    As long as no chest wall invasion she is a good candidate for robotic left upper lobectomy.  I discussed treatment options with her and we agree that this is the best treatment option for her.  I reviewed her PFTs and she has adequate lung reserve to tolerate anatomic lobectomy.    We discussed preoperative operative postoperative expectations as well as risk and benefits. We discussed the risk and benefits of surgery and alternatives including but not limited to bleeding, infection, injury to major vessels or organs, need for transfusion, need for conversion to open operation, pneumonia, prolonged airleak, risk of anesthesia, and/or death.  She understands these risk and agrees to proceed.  Will obtain MRI of the chest to assess for chest wall invasion if that looks okay plan on proceeding with surgery soon.    Thank you for trusting me the care of Ms. Stack.  Please do not hesitate to call questions or concerns.     -12/05/2024 - MRI Chest with and without contrast:  Redemonstrated left upper lobe lung mass. There is greater than 3 cm tumor contact with the chest wall and there is some loss of the extrapleural fat plane along the anterolateral aspect of the mass. Findings are suspicious for chest wall invasion, though not definitive.     -12/06/2024 -  Documentation by Dr. Beltre's office:  Dr. Beltre has reviewed imaging.  There is concern for chest wall invasion.  He recommends a referral to radiation oncology for radiation therapy and then reassess after completion to determine if surgery is an option.  I have called the patient and discussed this with her and she is agreeable.  Referral placed to radiation oncology.    - 1/2/2025-CMP normal, CEA 2.97, B12 576, folate 16.9, ferritin 343, iron 79, iron saturation 23%, CBC normal    - 1/10/2025-[facility]  gene panel on biopsy specimen, 10/28/2025-PD-L1 expression negative.  Potentially actionable genomic variant: KRAS (FDA approved therapies: Adagrasib, sotorasib).  TMB 6.3 (60th percentile), TOY.  Pertinent negatives: EGFR, BRAF, ALK, ROS1, RET, MET, HER2    - 1/31/2025-MRI brain-unremarkable.  No intracranial metastatic disease.     Previous interventions:  -12/16/2024 - Consult Dr. Collazo:  Deana Stack is a 64-year-old female with a good performance status that has an incidentally diagnosed clinical stage IIB (T3 N0 M0) non-small cell carcinoma of the left lung consistent with adenocarcinoma.  She has had a surgical evaluation with recommendations of preoperative chemoradiation therapy in an effort to have an R0 resection for definitive therapy.  I agree with these recommendations and that the patient is a candidate.  We will plan for preoperative chemoradiation therapy per NCCN guidelines.  I anticipate  preoperative chemoradiation therapy using IMRT/IGRT techniques to a total dose of 5400 cGy over 5-6 weeks, final course pending.     -01/02/2024 - Patient is seen for preoperative systemic therapy considerations:  - Neoadjuvant carboplatin and paclitaxel-C1, 1/16/2025;C2, 1/23/2025; C3, 1/30/2025; C4, 2/6/2025   - Concurrent radiation beginning 1/16/2025-2/7/2025 (14 fractions).    -  3/11/2025- CT chest-  Slight interval decrease in size of the pleural-based mass in the anterior left upper chest, currently  measuring approximately 1.9 x 1.0 cm, with surrounding infiltrate or inflammation. The mass previously  measured approximately 2.4 x 1.2 cm. The decrease in size probably  indicates positive treatment response.No evidence of intrathoracic lymphadenopathy.Small new vague groundglass nodule in the right upper lobe, measuring 1.2 cm. Follow-up chest CT is recommended in 6 months for this finding.Interval insertion of right subclavian port catheter, in good  position.  -  3/11/2025- PET scan- Interval decrease in size of the pleural-based mass in the anterior left upper hemithorax compatible with positive treatment response, this mass currently measuring approximately 1.9 x 1.0 cm, compared with 2.4 x 1.2 cm on the previous PET/CT. There is residual FDG avidity associated with the pleural-based mass, activity is actually slightly greater, this may represent paradoxical increased activity related to interval  treatment. There is a new groundglass nodule in the right upper lobe measures 1.2 cm, with minimal FDG activity, below background. This is unlikely to represent a metastatic focus. Small focal area of inflammation or  infection could have this appearance. This finding can be reevaluated on  follow-up chest CT.  -  3/18/2025 - Left thoracotomy with left upper lobectomy and mediastinal lymph node dissection.  Final diagnosis: 1.  Left upper lobe lung, lobectomy:  Invasive lepidic adenocarcinoma.  Maximum tumor diameter is 3.8 cm.  The tumor focally involves the visceral pleural surface.  The surgical margins are free of tumor.   2.  Level 5 lymph node #1, excision:  1 benign lymph node.   3.  Level 5 lymph node #2, excision:  1 benign lymph node.   4.  Level 6 lymph node by ascending aorta, excision:  1 benign lymph node with hyalinized calcified granulomas.   5.  Level 9 lymph node, excision:  1 benign lymph node.   6.  Level 11 lymph node on pulmonary artery, biopsy:  1 benign lymph node.   7.  Level 10 lymph node  on bronchus, excision:  1 benign lymph node.   8.  Level 7 lymph node, excision:  1 benign lymph node.     Synoptic Checklist   LUNG   8th Edition - Protocol posted: 9/21/2022LUNG - All Specimens  SPECIMEN   Procedure  Lobectomy   Specimen Laterality  Left   TUMOR   Tumor Focality  Single focus   Tumor Site  Upper lobe of lung   Tumor Size     Total Tumor Size (size of entire tumor)  Greatest Dimension (Centimeters): 3.8 cm   Size of Invasive Component  Greatest Dimension (Centimeters): 3.8 cm   Histologic Type  Invasive lepidic adenocarcinoma   Histologic Patterns Present  Acinar: 25     Lepidic: 75   Histologic Grade  G2, moderately differentiated   Visceral Pleura Invasion  Present   Direct Invasion of Adjacent Structures  Not applicable (no adjacent structures present)   Treatment Effect  No known presurgical therapy   Lymphovascular Invasion  Not identified   MARGINS   Margin Status for Invasive Carcinoma  All margins negative for invasive carcinoma   Closest Margin(s) to Invasive Carcinoma  Bronchial     Parenchymal   Distance from Invasive Carcinoma to Closest Margin  At least: 3.8  cm   Margin Status for Non-Invasive Tumor  All margins negative for non-invasive tumor   REGIONAL LYMPH NODES   Lymph Node(s) from Prior Procedures  Not included   Regional Lymph Node Status  All regional lymph nodes negative for tumor   Number of Lymph Nodes Examined  7   Dusty Site(s) Examined  7: Subcarinal     5: Subaortic / aortopulmonary (AP) / AP window     6: Para-aortic (ascending aorta or phrenic)     9L: Pulmonary ligament     10L: Hilar     11L: Interlobar   PATHOLOGIC STAGE CLASSIFICATION (pTNM, AJCC 8th Edition)   Reporting of pT, pN, and (when applicable) pM categories is based on information available to the pathologist at the time the report is issued. As per the AJCC (Chapter 1, 8th Ed.) it is the managing physician’s responsibility to establish the final pathologic stage based upon all pertinent information,  including but potentially not limited to this pathology report.   pT Category  pT2a   pN Category  pN0               LABS    Lab Results - Last 18 Months   Lab Units 07/31/25  0728 03/21/25  0255 03/20/25  0540 03/19/25  0457 03/18/25  1534 03/14/25  1108 02/06/25  0808 01/30/25  0818 01/23/25  0804 01/16/25  0816   HEMOGLOBIN g/dL 12.8 9.8* 9.6* 10.2* 11.1* 12.1 12.9 12.3 13.2 12.7   HEMATOCRIT % 40.1 29.5* 29.4* 30.7* 33.5* 35.3 40.1 38.5 40.3 39.5   MCV fL 85.1 91.9 92.5 90.6 90.5 88.7 90.1 90.6 90.4 89.4   WBC 10*3/mm3 5.05 4.82 6.11 9.53 10.29 3.50 3.62 4.05 4.63 5.69   RDW % 13.4 15.4 15.7* 15.3 15.1 14.9 13.1 13.1 12.7 12.7   MPV fL 9.6 9.3 9.4 9.4 9.1 9.0 9.1 9.4 9.4 9.4   PLATELETS 10*3/mm3 225 255 252 251 261 236 178 197 199 188   IMM GRAN % % 0.4  --   --   --   --  0.3 0.3 0.5 0.6* 0.4   NEUTROS ABS 10*3/mm3 3.36  --   --   --   --  1.90 2.42 2.80 3.27 4.14   LYMPHS ABS 10*3/mm3 1.12  --   --   --   --  1.05 0.78 0.79 0.86 0.97   MONOS ABS 10*3/mm3 0.41  --   --   --   --  0.45 0.37 0.38 0.37 0.44   EOS ABS 10*3/mm3 0.12  --   --   --   --  0.08 0.03 0.05 0.08 0.09   BASOS ABS 10*3/mm3 0.02  --   --   --   --  0.01 0.01 0.01 0.02 0.03   IMMATURE GRANS (ABS) 10*3/mm3 0.02  --   --   --   --  0.01 0.01 0.02 0.03 0.02   NRBC /100 WBC 0.0  --   --   --   --  0.0 0.0 0.0 0.0 0.0       PAST MEDICAL HISTORY:  ALLERGIES:  No Known Allergies  CURRENT MEDICATIONS:  Outpatient Encounter Medications as of 7/31/2025   Medication Sig Dispense Refill    Cholecalciferol 10 MCG (400 UNIT) tablet Take 1 tablet by mouth Daily.      esomeprazole (nexIUM) 20 MG capsule Take 1 capsule by mouth Every Morning Before Breakfast.      multivitamin with minerals tablet tablet Take 1 tablet by mouth Daily.      varenicline (CHANTIX) 1 MG tablet Take 1 tablet by mouth Every 12 (Twelve) Hours.      vitamin C (ASCORBIC ACID) 250 MG tablet Take 1 tablet by mouth Daily.      zinc sulfate (ZINCATE) 220 (50 Zn) MG capsule Take 1 capsule by  mouth Daily.      pregabalin (Lyrica) 25 MG capsule Take 1 capsule by mouth 2 (Two) Times a Day for 14 days. 28 capsule 0     Facility-Administered Encounter Medications as of 7/31/2025   Medication Dose Route Frequency Provider Last Rate Last Admin    [COMPLETED] iopamidol (ISOVUE-300) 61 % injection 100 mL  100 mL Intravenous Once in imaging Mitchel Tello MD   100 mL at 07/31/25 0801     ADULT ILLNESSES:  Patient Active Problem List   Diagnosis Code    Malignant neoplasm of upper lobe of left lung C34.12    Current every day smoker F17.200    Encounter for care related to Port-a-Cath Z45.2    ERRONEOUS ENCOUNTER--DISREGARD     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 C34.12    Lung cancer C34.90    History of radiation therapy Z92.3    History of lobectomy of lung Z90.2     SURGERIES:  Past Surgical History:   Procedure Laterality Date    BRONCHOSCOPY WITH ION ROBOTIC ASSIST N/A 10/28/2024    Procedure: BRONCHOSCOPY WITH ION ROBOT;  Surgeon: Donovan Dewitt MD;  Location:  [facility] OR;  Service: Robotics - Pulmonary;  Laterality: N/A;  preop; GALLO nodule   postop GALLO nodule   PCP Elena Fitch    CHOLECYSTECTOMY N/A     HYSTERECTOMY      LOBECTOMY Left 3/18/2025    Procedure: LEFT LOBECTOMY WITH CHEST WALL RESECTION;  Surgeon: Jaime Beltre MD;  Location:  [facility] OR;  Service: Cardiothoracic;  Laterality: Left;    LUNG BIOPSY      LYMPH NODE DISSECTION N/A 3/18/2025    Procedure: MEDIASTINAL LYMPH NODE DISSECTION;  Surgeon: Jaime Beltre MD;  Location:  [facility] OR;  Service: Cardiothoracic;  Laterality: N/A;    THORACOTOMY Left 3/18/2025    Procedure: LEFT THORACOTOMY;  Surgeon: Jaime Beltre MD;  Location:  [facility] OR;  Service: Cardiothoracic;  Laterality: Left;    VENOUS ACCESS DEVICE (PORT) INSERTION N/A 01/14/2025    Procedure: Single Lumen Port-a-cath insertion with flouroscopy;  Surgeon: Osvaldo Lemus MD;  Location:  [facility] OR;  Service: General;  Laterality: N/A;     HEALTH MAINTENANCE  ITEMS:  Health Maintenance Due   Topic Date Due    DXA SCAN  Never done    TDAP/TD VACCINES (1 - Tdap) Never done    ZOSTER VACCINE (1 of 2) Never done    MAMMOGRAM  Never done    COLORECTAL CANCER SCREENING  Never done    COVID-19 Vaccine (3 - Moderna risk series) 2021    HEPATITIS C SCREENING  Never done    ANNUAL WELLNESS VISIT  Never done       <no information>  Last Completed Colonoscopy    This patient has no relevant Health Maintenance data.       Immunization History   Administered Date(s) Administered    COVID-19 (MODERNA) 1st,2nd,3rd Dose Monovalent 2021, 2021    Fluzone (or Fluarix & Flulaval for VFC) >6mos 2015    Pneumococcal Conjugate 13-Valent (PCV13) 2024    Pneumococcal Conjugate 20-Valent (PCV20) 2024     Last Completed Mammogram    This patient has no relevant Health Maintenance data.           FAMILY HISTORY:  Family History   Problem Relation Age of Onset    Cancer Mother         Had left breast removed at age 71    Diabetes Mother         Diabetes at age 45    Asthma Sister         Passed away due to Covid19    Diabetes Brother         Diabetes age 70    Diabetes Brother         Diabetes age 65    Diabetes Brother         age 65     SOCIAL HISTORY:  Social History     Socioeconomic History    Marital status:    Tobacco Use    Smoking status: Former     Current packs/day: 0.00     Average packs/day: 1 pack/day for 48.2 years (47.9 ttl pk-yrs)     Types: Cigarettes     Start date: 1977     Quit date: 3/15/2025     Years since quittin.3     Passive exposure: Current    Smokeless tobacco: Never   Vaping Use    Vaping status: Never Used   Substance and Sexual Activity    Alcohol use: Never    Drug use: Never    Sexual activity: Not Currently     Partners: Male     Birth control/protection: Hysterectomy       REVIEW OF SYSTEMS:  Review of Systems   Constitutional:  Negative for fatigue and unexpected weight loss.        Manages her ADLs to include  "chores, errands and driving.  Is up and about, \"most of the time.\" Is again working part-time.  \"I feel great\"   HENT:  Positive for postnasal drip.    Eyes: Negative.    Respiratory: Negative.  Negative for cough and shortness of breath.         Ex cigarette smoker-age 16-present.  Up to 1 pack/day.  States that she is no longer smoking, \"since 3/17/25\".  Is no longer on Wellbutrin.   Cardiovascular: Negative.    Gastrointestinal: Negative.    Endocrine: Negative.    Genitourinary: Negative.    Musculoskeletal: Negative.    Skin: Negative.    Allergic/Immunologic: Negative.    Neurological: Negative.    Hematological: Negative.    Psychiatric/Behavioral: Negative.       /64   Pulse 85   Temp 96.6 °F (35.9 °C) (Temporal)   Resp 18   Ht 157.5 cm (62\")   Wt 66.7 kg (147 lb)   SpO2 99%   BMI 26.89 kg/m²  Body surface area is 1.68 meters squared.  Pain Score    07/31/25 0941   PainSc: 0-No pain       Physical Exam  Vitals and nursing note reviewed.   Constitutional:       Comments: Pleasant, cooperative, slender middle-aged female.  Ambulatory.  ECOG 0.      Has gained 4 lb (in addition to 7 lb at her prior visit) since the last visit   HENT:      Head: Normocephalic and atraumatic.   Eyes:      General: No scleral icterus.     Extraocular Movements: Extraocular movements intact.      Pupils: Pupils are equal, round, and reactive to light.   Cardiovascular:      Rate and Rhythm: Normal rate.   Pulmonary:      Effort: Pulmonary effort is normal.      Breath sounds: No wheezing, rhonchi or rales.      Comments: Port in the right upper chest is well seated    Distant breath sounds bilaterally.    Left thoracotomy and thoracoscopy incisions are well-healed.  Abdominal:      Palpations: Abdomen is soft.      Tenderness: There is no abdominal tenderness.   Musculoskeletal:         General: Normal range of motion.      Cervical back: Normal range of motion.   Lymphadenopathy:      Cervical: No cervical adenopathy. "   Skin:     General: Skin is warm.   Neurological:      General: No focal deficit present.      Mental Status: She is alert and oriented to person, place, and time.   Psychiatric:         Mood and Affect: Mood normal.         Behavior: Behavior normal.         Thought Content: Thought content normal.         .Assessment:  1.   Adenocarcinoma of the upper lobe of the left lung.  --Original tumor stage: IIB (cT3, cN0, cM0)   --Pathologic tumor stage: IB (pT2a, pN0)  --Original tumor burden:  -07/31/2024 - CT Chest Low Dose:  Emphysematous changes with a peripheral area of consolidation in the medial aspect of the left upper lobe. This could be secondary to scarring. Comparison with any prior exams would be beneficial to assist stability. If none are available, followed-up PET/CT should be considered for more complete characterization.     -09/16/2024 - PET Scan:  The pleural-based nodule within the anterior LEFT upper lobe shows unchanged size compared with the outside CT exam from 6 weeks ago.  While this could represent focal pneumonia the unchanged size over 6 weeks and the degree of FDG uptake is compatible with a primary lung neoplasm.  No abnormal FDG uptake is seen outside of the chest.  -10/22/2024 - CT Chest without contrast:  Stable irregular 2.6 x 1.3 cm anterior subpleural left upper lobe pulmonary nodule, hypermetabolic on PET/CT of 9/16/2024 concerning for malignancy. No pathologic intrathoracic or axillary lymphadenopathy.  Moderate emphysema.      -10/28/2024 - Bronchoscopy with biopsy per :  Nodule, left upper lobe lung, Ion biopsy (cellblock):  Non-small cell carcinoma, adenocarcinoma consistent with lung primary.  Nodule, left upper lobe lung, Ion fine-needle aspiration (smears and ThinPrep):  Non-small cell carcinoma, adenocarcinoma.  Station 7 lymph node, EBUS (smears and ThinPrep):  No malignant cells identified.  Mixed lymphocytes and histiocytes consistent with lymph node.  Bronchial  washing (ThinPrep):  No malignant cells identified.  Benign bronchial epithelial cells, macrophages, and mixed inflammation present.  -11/21/2024 - Appointment with Dr. Beltre:  I reviewed her imaging and I am concerned somewhat of the chest wall invasion.    Given this we plan to get an MRI of the chest to further assess for this ensure that the portion abutting the visceral and parietal pleura is atelectasis and not tumor.    As long as no chest wall invasion she is a good candidate for robotic left upper lobectomy.  I discussed treatment options with her and we agree that this is the best treatment option for her.  I reviewed her PFTs and she has adequate lung reserve to tolerate anatomic lobectomy.    We discussed preoperative operative postoperative expectations as well as risk and benefits. We discussed the risk and benefits of surgery and alternatives including but not limited to bleeding, infection, injury to major vessels or organs, need for transfusion, need for conversion to open operation, pneumonia, prolonged airleak, risk of anesthesia, and/or death.  She understands these risk and agrees to proceed.  Will obtain MRI of the chest to assess for chest wall invasion if that looks okay plan on proceeding with surgery soon.    -12/05/2024 - MRI Chest with and without contrast:  Redemonstrated left upper lobe lung mass. There is greater than 3 cm tumor contact with the chest wall and there is some loss of the extrapleural fat plane along the anterolateral aspect of the mass. Findings are suspicious for chest wall invasion, though not definitive.  -12/06/2024 - Documentation by Dr. Beltre's office:  Dr. Beltre has reviewed imaging.  There is concern for chest wall invasion.  He recommends a referral to radiation oncology for radiation therapy and then reassess after completion to determine if surgery is an option.  I have called the patient and discussed this with her and she is agreeable.  Referral placed to  radiation oncology.  - 1/2/2025-CMP normal, CEA 2.97, B12 576, folate 16.9, ferritin 343, iron 79, iron saturation 23%, CBC normal  - 1/10/2025-[facility]  gene panel on biopsy specimen, 10/28/2025-PD-L1 expression negative.  Potentially actionable genomic variant: KRAS (FDA approved therapies: Adagrasib, sotorasib).  TMB 6.3 (60th percentile), TOY.  Pertinent negatives: EGFR, BRAF, ALK, ROS1, RET, MET, HER2    --Complications of tumor:  None. Incidental finding    --Tumor status:    -12/16/2024 - Consult Dr. Collazo:  Deana Stack is a 64-year-old female with a good performance status that has an incidentally diagnosed clinical stage IIB (T3 N0 M0) non-small cell carcinoma of the left lung consistent with adenocarcinoma.  She has had a surgical evaluation with recommendations of preoperative chemoradiation therapy in an effort to have an R0 resection for definitive therapy.  I agree with these recommendations and that the patient is a candidate.  We will plan for preoperative chemoradiation therapy per NCCN guidelines.  I anticipate  preoperative chemoradiation therapy using IMRT/IGRT techniques to a total dose of 5400 cGy over 5-6 weeks, final course pending.     - Neoadjuvant carboplatin and paclitaxel-C1, 1/16/2025;C2, 1/23/2025; C3, 1/30/2025; C4, 2/6/2025   - Concurrent radiation -1/16/2025-2/7/2025 (14 fractions).    -  3/18/2025 underwent left thoracotomy with left upper lobectomy and mediastinal lymph node dissection. Final diagnosis: 1.  Left upper lobe lung, lobectomy: Invasive lepidic adenocarcinoma. Maximum tumor diameter is 3.8 cm.The tumor focally involves the visceral pleural surface. The surgical margins are free of tumor. 2.  Level 5 lymph node #1, excision: 1 benign lymph node. 3.  Level 5 lymph node #2, excision: 1 benign lymph node. 4.  Level 6 lymph node by ascending aorta, excision: 1 benign lymph node with hyalinized calcified granulomas. 5.  Level 9 lymph node, excision: 1 benign lymph  "node. 6.  Level 11 lymph node on pulmonary artery, biopsy: 1 benign lymph node. 7.  Level 10 lymph node on bronchus, excision: 1 benign lymph node. 8.  Level 7 lymph node, excision: 1 benign lymph node.  Pathologic stage classification: pT2a,pN0  - 7/31/2025-CT chest-1. Interval resolution of the anterior LEFT upper lobe pleural mass.2. Posttreatment changes with LEFT lung volume loss and a new small LEFT pleural effusion. 3. No new finding or sign of metastatic disease within the chest abdomen.    2.   Ex tobacco smoker-24 pack years.  Quit smoking 3/17/2025.  \"I still get cravings\"  3.   COPD  4.   Bronchiectasis    Plan:  1.   Review labs, 7/31/2025-CMP normal, CBC normal, CEA p (prior:  2.97)    2.   Review CT chest, 7/31/2025 (above).  OLIVERIO.    3.   Review [facility] XT from biopsy specimen, 10/28/2024: PD-L1 expression negative.  Potentially actionable genomic variant: KRAS (FDA approved therapies: Adagrasib, sotorasib).  TMB 6.3 (60th percentile), TOY.  Pertinent negatives: EGFR, BRAF, ALK, ROS1, RET, MET, HER2  4.   NCCN guideline 3.2025 non-small cell lung cancer.  Findings at surgery: Stage Ib ([id number])-margins negative (R0)-adjuvant treatment: Observe OR adjuvant systemic therapy for T2a tumors= 4 cm-surveillance after completion of definitive therapy: Stage I-II (primary treatment included surgery+/- chemotherapy) H&P and chest CT+/- contrast every 6 months for 2-3 years then H&P and low-dose noncontrast enhanced chest CT annually.  5.   NCCN guidelines 1.2025 -non-small cell lung cancer- clinical presentation: Chest wall, proximal airway or mediastinum (T3 invasion, N0-1; resectable; T4 invasion, N0-1)-initial treatment- surgery or preoperative systemic therapy or concurrent chemoradiation - surgical reevaluation (including chest CT+/- PET scan). Adjuvant treatment: Chemotherapy+ atezolizumab or pembrolizumab or osimertinib if R0 (margins negative).  If margins positive: Reresection+ chemotherapy or " chemoradiation (R1/R2).  Resection+ RT boost then adjuvant systemic therapy- surveillance     6.  CT chest with IV contrast in 23 weeks   7.  Port flush q 8 weeks  8. Return to office in 24 weeks with preoffice CMP, CBC w diff, CEA  11.  Importance of Smoking Cessation discussed with patient and informed patient additional information will be on today's [facility]'s Flyer - Plan to Be Tobacco Free handout provided to patient            I spent ~34 minutes caring for Deana on this date of service. This time includes time spent by me in the following activities: preparing for the visit, reviewing tests, performing a medically appropriate examination and/or evaluation, counseling and educating the patient/family/caregiver, ordering medications, tests, or procedures and documenting information in the medical record.

## 2025-07-28 NOTE — PROGRESS NOTES
[facility]  Radiation Oncology Clinic   Sushant Collazo MD, FACR  Rei WILKINSON  _______________________________________________  [facility]  Department of Radiation Oncology  [address] [place] [id number]  Office: [phone]  Fax: [phone]    DATE: 07/31/2025  PATIENT: Deana Stack  1960                         MEDICAL RECORD #: [id number]    1. History of primary non-small cell carcinoma of left lung    2. History of lobectomy of lung    3. History of radiation therapy    4. Former smoker                                               REASON FOR VISIT:    Chief Complaint   Patient presents with    Lung Cancer     Reason for Follow up Visit:  Deana Stack is a very pleasant 65 y.o. patient that completed radiation to the lung and returns to the clinic today for oncologic surveillance .     History of Present Illness:  Diagnosed with Stage IIB (T3 N0 M0) non-small cell carcinoma of the left lung consistent with adenocarcinoma. He completed 5400 cGy in 27 fractions to the left lung on 02/06/2025.     07/31/2024 - CT Chest Low Dose:  Emphysematous changes with a peripheral area of consolidation in the medial aspect of the left upper lobe. This could be secondary to scarring. Comparison with any prior exams would be beneficial to assist stability. If none are available, followed-up PET/CT should be considered for more complete characterization.    09/16/2024 - PET Scan:  The pleural-based nodule within the anterior LEFT upper lobe shows unchanged size compared with the outside CT exam from 6 weeks ago.  While this could represent focal pneumonia the unchanged size over 6 weeks and the degree of FDG uptake is compatible with a primary lung neoplasm.  No abnormal FDG uptake is seen outside of the chest.    10/14/2024 - Appointment with JAJA Deluna:  Left upper lobe pulmonary nodule (Primary)  schedule for Ion robotic  bronchoscopy with Dr. Dewitt. Risks and benefits discussed. She is not on any blood thinners.  Follow up:  Plan as above. Office follow up in a few weeks post procedure. Call sooner if needed.     10/22/2024 - CT Chest without contrast:  Stable irregular 2.6 x 1.3 cm anterior subpleural left upper lobe pulmonary nodule, hypermetabolic on PET/CT of 2024 concerning for malignancy. No pathologic intrathoracic or axillary lymphadenopathy.  Moderate emphysema.     10/28/2024 - Bronchoscopy with biopsy per :  Nodule, left upper lobe lung, Ion biopsy (cellblock):  Non-small cell carcinoma, adenocarcinoma consistent with lung primary.  Nodule, left upper lobe lung, Ion fine-needle aspiration (smears and ThinPrep):  Non-small cell carcinoma, adenocarcinoma.  Station 7 lymph node, EBUS (smears and ThinPrep):  No malignant cells identified.  Mixed lymphocytes and histiocytes consistent with lymph node.  Bronchial washing (ThinPrep):  No malignant cells identified.  Benign bronchial epithelial cells, macrophages, and mixed inflammation present.    2024 - Appointment with JAJA Deluna:  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  We reviewed cytology from recent ion. CTS referral orders have been placed.   Follow up:  Follow up in 3 months     2024 - Pulmonary Function Tests:  Pre Drug % Predicted   FVC: 106%  FEV1: 87%  FEF 25-75%: 46%  FEV1/FVC: 66.44%  T%  RV: 191%  DLCO: 91%  D/VAsb: 71%     Interpretation Spirometry   Spirometry shows mild obstruction. There is reduced midflow suggesting small airway/airflow obstruction.   Review of FVL curve   Patient's effort is normal.   Lung Volume Measurements  Measurements show elevated residual volume consistent with gas trapping and elevated TLC consistent with hyperventilation.   Diffusion Capacity  The patient's diffusion capacity is normal.  Diffusion capacity is normal when corrected for alveolar volume.     2024 - Appointment with   Alexsander:  I reviewed her imaging and I am concerned somewhat of the chest wall invasion.    Given this we plan to get an MRI of the chest to further assess for this ensure that the portion abutting the visceral and parietal pleura is atelectasis and not tumor.    As long as no chest wall invasion she is a good candidate for robotic left upper lobectomy.  I discussed treatment options with her and we agree that this is the best treatment option for her.  I reviewed her PFTs and she has adequate lung reserve to tolerate anatomic lobectomy.    We discussed preoperative operative postoperative expectations as well as risk and benefits. We discussed the risk and benefits of surgery and alternatives including but not limited to bleeding, infection, injury to major vessels or organs, need for transfusion, need for conversion to open operation, pneumonia, prolonged airleak, risk of anesthesia, and/or death.  She understands these risk and agrees to proceed.  Will obtain MRI of the chest to assess for chest wall invasion if that looks okay plan on proceeding with surgery soon.    Thank you for trusting me the care of Ms. Stack.  Please do not hesitate to call questions or concerns.     12/05/2024 - MRI Chest with and without contrast:  Redemonstrated left upper lobe lung mass. There is greater than 3 cm tumor contact with the chest wall and there is some loss of the extrapleural fat plane along the anterolateral aspect of the mass. Findings are suspicious for chest wall invasion, though not definitive.    12/06/2024 - Documentation by Dr. Beltre's office:  Dr. Beltre has reviewed imaging.  There is concern for chest wall invasion.  He recommends a referral to radiation oncology for radiation therapy and then reassess after completion to determine if surgery is an option.  I have called the patient and discussed this with her and she is agreeable.  Referral placed to Dr. Duong.     12/16/2024 - Consult with :  Following  this discussion and in consideration of the diagnostic data/evaluation of the patient, I recommended a course of external beam radiation, likely delivered concurrently with systemic therapy under the medical oncology service, I anticipate  preoperative chemoradiation therapy using IMRT/IGRT techniques to a total dose of 5400 cGy over 5-6 weeks, final course pending.  Continue ongoing management per primary care physician and other specialists.   Plan:  Follow-up with referral to medical oncology to consider preop concurrent chemoradiation.    12/30/2024 - 02/06/2025 - Completed radiation course:  Received 5400 cGy in 27 fractions to the left lung.     01/02/2025 - Consult with :  Plan:  Review available information as it pertains to the lung neoplasm (above), including CT chest, PET scan, MRI and Ion bronchoscopy showing adenocarcinoma upper lobe of left lung  Send biopsy specimen for [facility] lung panel (PD-L1, ALK, ROS1, EGFR, BRAF, MEK, NTRK, etc)  Schedule brain MRI  Review consults from Dr. Beltre (CT surgery) and Dr. Collazo (radiation oncology).  She has had a surgical evaluation with recommendations of preoperative chemoradiation therapy in an effort to have an R0 resection for definitive therapy.  We will plan for preoperative chemoradiation therapy per NCCN guidelines.  I anticipate  preoperative chemoradiation therapy using IMRT/IGRT techniques to a total dose of 5400 cGy over 5-6 weeks,  Draw baseline CBC w diff, fe, fe sat, ferritin, B12, folate, CEA, CMP  Discussed with Dr. Collazo.  Will plan on concurrent neoadjuvant chemo+IO followed by surgery  NCCN guidelines 1.2025 -non-small cell lung cancer- clinical presentation: Chest wall, proximal airway or mediastinum (T3 invasion, N0-1; resectable; T4 invasion, N0-1)-initial treatment- surgery or preoperative systemic therapy or concurrent chemoradiation - surgical reevaluation (including chest CT+/- PET scan). Adjuvant treatment: Chemotherapy+  atezolizumab or pembrolizumab or osimertinib if R0 (margins negative).  If margins positive: Reresection+ chemotherapy or chemoradiation (R1/R2).  Resection+ RT boost then adjuvant systemic therapy- surveillance  Teaching sheets for carboplatin, paclitaxel   Re: Discussed the potential toxicities of paclitaxel (to include but not limited to: Myelosuppression, opportunistic infection, neuropathy, hypersensitivity reaction, anaphylaxis, cardiac conduction disturbance, bradycardia, arrhythmias, hypothyroidism, syncope, hypertension, thromboembolism, myocardial infarction, severe injection site reaction, pulmonary toxicity, neurotoxicity, GI obstruction/perforation, paralytic ileus, ischemic colitis, pancreatitis, hepatotoxicity, severe skin reaction, febrile neutropenia, alopecia, arthralgias/myalgias, nausea/vomiting, diarrhea, mucositis, asthenia, bleeding, bradycardia, edema). Questions answered.  She will agree to a trial of therapy.    Re: Discussed potential toxicities of carboplatin (to include but not limited to: Anaphylactic reaction, nephrotoxicity, myelosuppression, O2 toxicity, neuropathy, nausea/ vomiting, vision loss, severe hypokalemia, hyponatremia, hypocalcemia, hypomagnesemia, elevated liver enzymes, pain, asthenia, paresthesias, abdominal pain, diarrhea, constipation, mucositis, bleeding, alopecia, injection site reaction).  Questions answered.  She will agree to a trial of therapy.  Schedule treatment (plan: weekly x 7-3-[id number] with RT)- C1, 1/8/2024; C2, 1/15/2024; C3, 1/15/2024; C4, etc   paclitaxel 45 mg/m2 per administration guidelines   Carboplatin AUC 2 per administration guidelines.  Premedicate with:   Aloxi 0.25 mg IV before chemo   Emend 150 mg IV before chemo  Decadron 10 mg p.o. IV before chemo   Pepcid 20 mg IV   Benadryl 50 mg IV  CMP and CBC on days of therapy. Hold if creatinine > 1.5, total bilirubin > 1.5, AST/ALT > 3x ULN, TSH <0.5 or > 2. Administer Procrit  40,000 units subcutaneously if hemoglobin less than 10 or hematocrit less than 30. Neupogen 480 mcg sc x 3 days if ANC < 1.0  Refer to general surgery Re: Port placement  eRx:   Zofran 8 mg p.o. 3 times daily as needed, #30 - Rx  Return to office in 6 weeks with preoffice CMP, CBC w diff  Importance of Smoking Cessation discussed with patient and informed patient additional information will be on today's AVS.    01/14/2025 - Port placement per Osvaldo Lemus MD.    01/31/2025 - MRI Brain with and without contrast:  Unremarkable brain MRI with contrast. No intracranial metastatic disease.    02/05/2025 - Appointment with Marilyn Reyes APRN:  Stage 1 mild COPD by GOLD classification (Primary)  No indication for inhaler need at this time.  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  Following with medical oncology and radiation oncology.  Finishing chemo tomorrow and will follow back up with CT surgery.  Oncology ordering surveillance scans  Current every day smoker  Has cut back to smoking 2 cigarettes a day.  Cessation recommended.  Follow up:   Plan as above.    Office follow-up in 6 months or sooner if needed.    03/11/2025 - CT Chest without contrast:  Slight interval decrease in size of the pleural-based mass in the anterior left upper chest, currently measuring approximately 1.9 x 1.0 cm, with surrounding infiltrate or inflammation. The mass previously measured approximately 2.4 x 1.2 cm. The decrease in size probably indicates positive treatment response.  No evidence of intrathoracic lymphadenopathy.  Small new vague groundglass nodule in the right upper lobe, measuring 1.2 cm. Follow-up chest CT is recommended in 6 months for this finding.  Interval insertion of right subclavian port catheter, in good Position.    03/11/2025 - PET Scan:  Interval decrease in size of the pleural-based mass in the anterior left upper hemithorax compatible with positive treatment response, this mass currently measuring  approximately 1.9 x 1.0 cm, compared with 2.4 x 1.2 cm on the previous PET/CT. There is residual FDG avidity associated with the pleural-based mass, activity is actually slightly greater, this may represent paradoxical increased activity related to interval treatment.  There is a new groundglass nodule in the right upper lobe measures 1.2 cm, with minimal FDG activity, below background. This is unlikely to represent a metastatic focus. Small focal area of inflammation or infection could have this appearance. This finding can be reevaluated on follow-up chest CT.    03/13/2025 - Appointment with :  Ms. Stack is a 64-year-old female she follows up with me after having neoadjuvant therapy for an adenocarcinoma left upper lobe that likely involves left chest wall.  She has had positive treatment response with much smaller appearing mass but it still does appear to abut and possibly involve the left anterior chest wall.  She continues to smoke but is trying hard to quit.  There is no evidence of metastatic disease on repeat imaging.  Discussed with the patient and her  today the natural history of lung cancer and patient such as her.  I would recommend surgical resection including chest wall resection to obtain adequate margin.  We discussed this occurring through open thoracotomy in order to allow adequate chest wall resection.  I discussed with her operative expectations of this as well as risk and benefits, she understands and wants to proceed. We discussed the risk and benefits of surgery and alternatives including but not limited to bleeding, infection, injury to major vessels or organs, need for transfusion, need for conversion to open operation, pneumonia, prolonged airleak, risk of anesthesia, and/or death.  She understands these risk and agrees to proceed.  I have reviewed her PFT she has adequate lung reserve to tolerate anatomic lobectomy.  We will plan on surgery next week.  Thank you for  trusting me with the care of Ms. Stack.  Please not hesitate to call questions or concerns.    03/18/2025 - Left lobectomy and lymph node excision per :  Left upper lobe lung, lobectomy:  Invasive lepidic adenocarcinoma.  Maximum tumor diameter is 3.8 cm.  The tumor focally involves the visceral pleural surface.  The surgical margins are free of tumor.  Level 5 lymph node #1, excision:  1 benign lymph node.  Level 5 lymph node #2, excision:  1 benign lymph node.  Level 6 lymph node by ascending aorta, excision:  1 benign lymph node with hyalinized calcified granulomas.  Level 9 lymph node, excision:  1 benign lymph node.  Level 11 lymph node on pulmonary artery, biopsy:  1 benign lymph node.  Level 10 lymph node on bronchus, excision:  1 benign lymph node.  Level 7 lymph node, excision:  1 benign lymph node.    Synoptic Checklist   LUNG   8th Edition - Protocol posted: 9/21/2022LUNG - All Specimens  SPECIMEN   Procedure  Lobectomy   Specimen Laterality  Left   TUMOR   Tumor Focality  Single focus   Tumor Site  Upper lobe of lung   Tumor Size     Total Tumor Size (size of entire tumor)  Greatest Dimension (Centimeters): 3.8 cm   Size of Invasive Component  Greatest Dimension (Centimeters): 3.8 cm   Histologic Type  Invasive lepidic adenocarcinoma   Histologic Patterns Present  Acinar: 25     Lepidic: 75   Histologic Grade  G2, moderately differentiated   Visceral Pleura Invasion  Present   Direct Invasion of Adjacent Structures  Not applicable (no adjacent structures present)   Treatment Effect  No known presurgical therapy   Lymphovascular Invasion  Not identified   MARGINS   Margin Status for Invasive Carcinoma  All margins negative for invasive carcinoma   Closest Margin(s) to Invasive Carcinoma  Bronchial     Parenchymal   Distance from Invasive Carcinoma to Closest Margin  At least: 3.8  cm   Margin Status for Non-Invasive Tumor  All margins negative for non-invasive tumor   REGIONAL LYMPH NODES   Lymph  Node(s) from Prior Procedures  Not included   Regional Lymph Node Status  All regional lymph nodes negative for tumor   Number of Lymph Nodes Examined  7   Dusty Site(s) Examined  7: Subcarinal     5: Subaortic / aortopulmonary (AP) / AP window     6: Para-aortic (ascending aorta or phrenic)     9L: Pulmonary ligament     10L: Hilar     11L: Interlobar   PATHOLOGIC STAGE CLASSIFICATION (pTNM, AJCC 8th Edition)   Reporting of pT, pN, and (when applicable) pM categories is based on information available to the pathologist at the time the report is issued. As per the AJCC (Chapter 1, 8th Ed.) it is the managing physician's responsibility to establish the final pathologic stage based upon all pertinent information, including but potentially not limited to this pathology report.   pT Category  pT2a   pN Category  pN0   ADDITIONAL FINDINGS   Additional Findings  Emphysema       03/27/2025 - Appointment with Renate Tavares APRN:  Overall, Deana Stack is doing well.  Pathology results reviewed with patient and her .   She is going to call Dr. Becerra's office to schedule sooner follow-up.    Following post op thoracic surgery home instructions.   Provided support and encouragement.   All questions have been answered to the best of my ability.    Patient has been instructed to contact our office with any questions or concerns should they arise prior to the next office visit.   Plan on 1 month follow up with Dr. Beltre with repeat chest xray.    Overall very happy with her progress.    04/24/2025 - Appointment with :  Plan:  Pathology from left thoracotomy and left upper lobectomy, 3/18/2025 (above) noted. pTNM-IB (pT2a,pN0)  Chemo tolerance discussed.  Residual fatigue otherwise no inordinate toxicities.    Review baseline labs, 1/2/2025-CMP normal, CEA 2.97, B12 576, folate 16.9, ferritin 343, iron 79, iron saturation 23%, CBC normal  Review [facility] XT from biopsy specimen, 10/28/2024: PD-L1  expression negative.  Potentially actionable genomic variant: KRAS (FDA approved therapies: Adagrasib, sotorasib).  TMB 6.3 (60th percentile), TOY.  Pertinent negatives: EGFR, BRAF, ALK, ROS1, RET, MET, HER2  Note negative brain MRI, 1/31/2025  NCCN guideline 3.2025 non-small cell lung cancer.  Findings at surgery: Stage Ib (qC2woG1)-margins negative (R0)-adjuvant treatment: Observe OR adjuvant systemic therapy for T2a tumors= 4 cm-surveillance after completion of definitive therapy: Stage I-II (primary treatment included surgery+/- chemotherapy) H&P and chest CT+/- contrast every 6 months for 2-3 years then H&P and low-dose noncontrast enhanced chest CT annually.  NCCN guidelines 1.2025 -non-small cell lung cancer- clinical presentation: Chest wall, proximal airway or mediastinum (T3 invasion, N0-1; resectable; T4 invasion, N0-1)-initial treatment- surgery or preoperative systemic therapy or concurrent chemoradiation - surgical reevaluation (including chest CT+/- PET scan). Adjuvant treatment: Chemotherapy+ atezolizumab or pembrolizumab or osimertinib if R0 (margins negative).  If margins positive: Reresection+ chemotherapy or chemoradiation (R1/R2).  Resection+ RT boost then adjuvant systemic therapy- surveillance  CT chest with IV contrast in 11 weeks   Port flush q 8 weeks  Return to office in 12 weeks with preoffice CMP, CBC w diff, CEA  Importance of Smoking Cessation discussed with patient and informed patient additional information will be on today's [facility]'s Flyer - Plan to Be Tobacco Free handout provided to patient     05/01/2025 - Chest x-ray:  Postoperative change of left upper lobectomy with expected left-sided volume loss.  Slight increase in left-sided atelectasis compared to 3/27/2025.   No consolidation, large pleural effusion, or pneumothorax.    05/01/2025 - Appointment with :  Ms. Stack is a 65-year-old female she returns to me in follow-up 6 weeks after left  thoracotomy left upper lobectomy mediastinal lymph node dissection for non-small cell lung cancer.  Final pathology is pT2a pN0 M0, had visceral pleural invasion.  She had undergone preoperative radiation and chemo therapy for suspected chest wall invasion but findings that surgery did not show chest wall invasion.  She has healed well from surgery, overall very happy with how she is done.  She has followed up with Dr. Becerra on they are going to continue with surveillance no adjuvant chemotherapy.  Overall very happy with how Ms. Stack is done.  She can return to normal activities without restriction.  She does have some minor neuropathic pain mainly late in the day does not want to take any neuropathic pain medicine.  If not improved can call and we can try that therapy later.   Ms. Stack can follow-up with me on as-needed basis.  Thank you for addressing the care of Ms. Stack.  Please do not hesitate to call questions or concerns.    05/05/2025 - Appointment with JAJA Deshpande - Radiation oncology:  Follow up 3 months     07/31/2025 - CT Chest with contrast:  Interval resolution of the anterior LEFT upper lobe pleural mass.  Posttreatment changes with LEFT lung volume loss and a new small LEFT pleural effusion.  No new finding or sign of metastatic disease within the chest abdomen.    07/31/2025 - Appointment with :    08/05/2025 - Scheduled appointment with Marilyn Reyes APRN.    History obtained from  PATIENT and CHART    PAST MEDICAL HISTORY  Past Medical History:   Diagnosis Date    Bronchiectasis     GERD (gastroesophageal reflux disease)     Lung cancer 10/2024    GALLO      PAST SURGICAL HISTORY  Past Surgical History:   Procedure Laterality Date    BRONCHOSCOPY WITH ION ROBOTIC ASSIST N/A 10/28/2024    Procedure: BRONCHOSCOPY WITH ION ROBOT;  Surgeon: Donovan Dewitt MD;  [facility];  Service: Robotics - Pulmonary;  Laterality: N/A;  preop; GALLO nodule   postop GALLO  nodule   PCP Elena Fitch    CHOLECYSTECTOMY N/A     HYSTERECTOMY      LOBECTOMY Left 3/18/2025    Procedure: LEFT LOBECTOMY WITH CHEST WALL RESECTION;  Surgeon: Jaime Beltre MD;  Location:  [facility];  Service: Cardiothoracic;  Laterality: Left;    LUNG BIOPSY      LYMPH NODE DISSECTION N/A 3/18/2025    Procedure: MEDIASTINAL LYMPH NODE DISSECTION;  Surgeon: Jaime Beltre MD;  Location:  [facility];  Service: Cardiothoracic;  Laterality: N/A;    THORACOTOMY Left 3/18/2025    Procedure: LEFT THORACOTOMY;  Surgeon: Jaime Beltre MD;  Location:  [facility];  Service: Cardiothoracic;  Laterality: Left;    VENOUS ACCESS DEVICE (PORT) INSERTION N/A 2025    Procedure: Single Lumen Port-a-cath insertion with flouroscopy;  Surgeon: Osvaldo Lemus MD;  Location:  [facility];  Service: General;  Laterality: N/A;      FAMILY HISTORY  family history includes Asthma in her sister; Cancer in her mother; Diabetes in her brother, brother, brother, and mother.    SOCIAL HISTORY  Social History     Tobacco Use    Smoking status: Former     Current packs/day: 0.00     Average packs/day: 1 pack/day for 48.2 years (47.9 ttl pk-yrs)     Types: Cigarettes     Start date: 1977     Quit date: 3/15/2025     Years since quittin.3     Passive exposure: Current    Smokeless tobacco: Never   Vaping Use    Vaping status: Never Used   Substance Use Topics    Alcohol use: Never    Drug use: Never     ALLERGIES  Patient has no known allergies.     MEDICATIONS    Current Outpatient Medications:     Cholecalciferol 10 MCG (400 UNIT) tablet, Take 1 tablet by mouth Daily., Disp: , Rfl:     esomeprazole (nexIUM) 20 MG capsule, Take 1 capsule by mouth Every Morning Before Breakfast., Disp: , Rfl:     multivitamin with minerals tablet tablet, Take 1 tablet by mouth Daily., Disp: , Rfl:     varenicline (CHANTIX) 1 MG tablet, Take 1 tablet by mouth Every 12 (Twelve) Hours., Disp: , Rfl:     vitamin C (ASCORBIC ACID) 250 MG tablet,  "Take 1 tablet by mouth Daily., Disp: , Rfl:     zinc sulfate (ZINCATE) 220 (50 Zn) MG capsule, Take 1 capsule by mouth Daily., Disp: , Rfl:     pregabalin (Lyrica) 25 MG capsule, Take 1 capsule by mouth 2 (Two) Times a Day for 14 days., Disp: 28 capsule, Rfl: 0  No current facility-administered medications for this visit.    Current outpatient and discharge medications have been reconciled for the patient.  Reviewed by: JAJA Bustillos    The following portions of the patient's history were reviewed and updated as appropriate: allergies, current medications, past family history, past medical history, past social history, past surgical history and problem list.    REVIEW OF SYSTEMS  Review of Systems   Constitutional:  Positive for fatigue.   HENT: Negative.     Eyes:         Glasses    Respiratory:  Positive for shortness of breath (with exeration).    Cardiovascular:  Positive for chest pain (\"discormfort\" to the left side where previous surgery was performed).   Gastrointestinal: Negative.    Endocrine: Negative.    Genitourinary: Negative.    Musculoskeletal: Negative.    Skin: Negative.    Allergic/Immunologic: Negative.    Neurological: Negative.    Hematological: Negative.    Psychiatric/Behavioral: Negative.             PHYSICAL EXAM  VITAL SIGNS:   Vitals:    07/31/25 0842   BP: 129/64   Pulse: 87   SpO2: 99%  Comment: room air   Weight: 66.8 kg (147 lb 3.2 oz)   Height: 157.5 cm (62\")   PainSc: 0-No pain       Physical Exam  Vitals reviewed.   Constitutional:       Appearance: Normal appearance.   HENT:      Head: Normocephalic.      Nose: Nose normal.   Eyes:      Pupils: Pupils are equal, round, and reactive to light.   Cardiovascular:      Rate and Rhythm: Normal rate and regular rhythm.      Pulses: Normal pulses.      Heart sounds: Normal heart sounds.   Pulmonary:      Effort: Pulmonary effort is normal. No respiratory distress.      Breath sounds: Normal breath sounds. No wheezing.   Abdominal: "      General: Bowel sounds are normal.      Palpations: There is no mass.   Musculoskeletal:         General: Normal range of motion.      Cervical back: Normal range of motion and neck supple. No tenderness.   Lymphadenopathy:      Cervical: No cervical adenopathy.   Skin:     General: Skin is warm and dry.      Capillary Refill: Capillary refill takes less than 2 seconds.   Neurological:      General: No focal deficit present.      Mental Status: She is alert and oriented to person, place, and time.      Motor: No weakness.   Psychiatric:         Mood and Affect: Mood normal.         Behavior: Behavior normal.          Performance Status: ECOG (0) Fully active, able to carry on all predisease performance without restriction    Clinical Quality Measures  - Pain Documented by Standardized Tool, FPS   Deana Stack reports a pain score of 0.  Given her pain assessment as noted, treatment options were discussed and the following options were decided upon as a follow-up plan to address the patient's pain: continuation of current treatment plan for pain.    - Body Mass Index Screening and Follow-Up Plan       - Tobacco Use: Screening and Cessation Intervention  Social History    Tobacco Use      Smoking status: Former        Packs/day: 0.00        Years: 1 pack/day for 48.2 years (47.9 ttl pk-yrs)        Types: Cigarettes        Start date: 1977        Quit date: 3/15/2025        Years since quittin.3        Passive exposure: Current      Smokeless tobacco: Never    - Advanced Care Planning Advance Care Planning  ACP discussion was held with the patient during this visit. Patient does not have an advance directive, information provided.    - PHQ-2 Depression Screening  Little interest or pleasure in doing things? Not at all   Feeling down, depressed, or hopeless? Not at all   PHQ-2 Total Score 0     ASSESSMENT AND PLAN  1. History of primary non-small cell carcinoma of left lung    2. History of lobectomy of  lung    3. History of radiation therapy    4. Former smoker      Orders Placed This Encounter   Procedures    CT Chest Without Contrast     Standing Status:   Future     Expected Date:   10/31/2025     Expiration Date:   10/31/2026     Does this exam require Mahesh Bronch Protocol?:   No     Reason for Exam::   lung nodule, SBRT     Release to patient:   Routine Release [[id number]]     RECOMMENDATIONS: Deana Stack is status post completion of radiation therapy to the lung and presents to our clinic today for oncological surveillance.     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 He completed 5400 cGy in 27 fractions to the left lung on 02/06/2025.     CT-scan of the chest on 07/31/2025 revealed interval resolution of the anterior LEFT upper lobe pleural mass. Posttreatment changes with LEFT lung volume loss and a new small LEFT pleural effusion. No new finding or sign of metastatic disease within the chest abdomen.     On exam, I do not see evidence for recurrent or metastatic disease at this time. She requires continued oncologic surveillance. We will continue routine follow-up/surveillance as discussed in 3 months with follow up CT scan before visit and I have instructed her to continue to see the other health care providers as per their scheduling.    Patient Instructions   1) return in 3 months with a CT chest beforec    Return in about 3 months (around 10/31/2025).    Time Spent: I spent 44 minutes caring for Deana on this date of service. This time includes time spent by me in the following activities: preparing for the visit, reviewing tests, obtaining and/or reviewing a separately obtained history, performing a medically appropriate examination and/or evaluation, counseling and educating the patient/family/caregiver, ordering medications, tests, or procedures, referring and communicating with other health care professionals, documenting  information in the medical record, independently interpreting results and communicating that information with the patient/family/caregiver, and care coordination.   Rei Neal, APRN  07/31/2025

## 2025-07-30 ENCOUNTER — HOSPITAL ENCOUNTER (OUTPATIENT)
Dept: RADIATION ONCOLOGY | Facility: HOSPITAL | Age: 65
Setting detail: RADIATION/ONCOLOGY SERIES
End: 2025-07-30
Payer: MEDICARE

## 2025-07-30 RX ORDER — VARENICLINE TARTRATE 1 MG/1
1 TABLET, FILM COATED ORAL EVERY 12 HOURS SCHEDULED
COMMUNITY
Start: 2025-03-17 | End: 2025-08-09

## 2025-07-31 ENCOUNTER — HOSPITAL ENCOUNTER (OUTPATIENT)
Dept: CT IMAGING | Facility: HOSPITAL | Age: 65
Discharge: HOME | End: 2025-07-31
Payer: MEDICARE

## 2025-07-31 ENCOUNTER — OFFICE VISIT (OUTPATIENT)
Age: 65
End: 2025-07-31
Payer: MEDICARE

## 2025-07-31 ENCOUNTER — APPOINTMENT (OUTPATIENT)
Dept: LAB | Facility: HOSPITAL | Age: 65
End: 2025-07-31
Payer: MEDICARE

## 2025-07-31 ENCOUNTER — OFFICE VISIT (OUTPATIENT)
Dept: ONCOLOGY | Facility: CLINIC | Age: 65
End: 2025-07-31
Payer: MEDICARE

## 2025-07-31 ENCOUNTER — LAB (OUTPATIENT)
Dept: LAB | Facility: HOSPITAL | Age: 65
End: 2025-07-31
Payer: MEDICARE

## 2025-07-31 VITALS
HEART RATE: 87 BPM | SYSTOLIC BLOOD PRESSURE: 129 MMHG | BODY MASS INDEX: 27.09 KG/M2 | HEIGHT: 62 IN | OXYGEN SATURATION: 99 % | WEIGHT: 147.2 LBS | DIASTOLIC BLOOD PRESSURE: 64 MMHG

## 2025-07-31 VITALS
TEMPERATURE: 96.6 F | HEIGHT: 62 IN | WEIGHT: 147 LBS | BODY MASS INDEX: 27.05 KG/M2 | DIASTOLIC BLOOD PRESSURE: 64 MMHG | HEART RATE: 85 BPM | SYSTOLIC BLOOD PRESSURE: 122 MMHG | OXYGEN SATURATION: 99 % | RESPIRATION RATE: 18 BRPM

## 2025-07-31 DIAGNOSIS — C34.12 MALIGNANT NEOPLASM OF UPPER LOBE OF LEFT LUNG: ICD-10-CM

## 2025-07-31 DIAGNOSIS — C34.12 ADENOCARCINOMA OF UPPER LOBE OF LEFT LUNG: Primary | ICD-10-CM

## 2025-07-31 DIAGNOSIS — Z85.118 HISTORY OF PRIMARY NON-SMALL CELL CARCINOMA OF LEFT LUNG: Primary | ICD-10-CM

## 2025-07-31 DIAGNOSIS — Z90.2 HISTORY OF LOBECTOMY OF LUNG: ICD-10-CM

## 2025-07-31 DIAGNOSIS — C34.12 ADENOCARCINOMA OF UPPER LOBE OF LEFT LUNG: ICD-10-CM

## 2025-07-31 DIAGNOSIS — Z87.891 FORMER SMOKER: ICD-10-CM

## 2025-07-31 DIAGNOSIS — Z92.3 HISTORY OF RADIATION THERAPY: ICD-10-CM

## 2025-07-31 LAB
ALBUMIN SERPL-MCNC: 4.3 G/DL (ref 3.5–5.2)
ALBUMIN/GLOB SERPL: 1.4 G/DL
ALP SERPL-CCNC: 96 U/L (ref 39–117)
ALT SERPL W P-5'-P-CCNC: 14 U/L (ref 1–33)
ANION GAP SERPL CALCULATED.3IONS-SCNC: 12 MMOL/L (ref 5–15)
AST SERPL-CCNC: 17 U/L (ref 1–32)
BASOPHILS # BLD AUTO: 0.02 10*3/MM3 (ref 0–0.2)
BASOPHILS NFR BLD AUTO: 0.4 % (ref 0–1.5)
BILIRUB SERPL-MCNC: 0.3 MG/DL (ref 0–1.2)
BUN SERPL-MCNC: 13.3 MG/DL (ref 8–23)
BUN/CREAT SERPL: 21.1 (ref 7–25)
CALCIUM SPEC-SCNC: 9.5 MG/DL (ref 8.6–10.5)
CEA SERPL-MCNC: 1.87 NG/ML
CHLORIDE SERPL-SCNC: 103 MMOL/L (ref 98–107)
CO2 SERPL-SCNC: 24 MMOL/L (ref 22–29)
CREAT SERPL-MCNC: 0.63 MG/DL (ref 0.57–1)
DEPRECATED RDW RBC AUTO: 42 FL (ref 37–54)
EGFRCR SERPLBLD CKD-EPI 2021: 98 ML/MIN/1.73
EOSINOPHIL # BLD AUTO: 0.12 10*3/MM3 (ref 0–0.4)
EOSINOPHIL NFR BLD AUTO: 2.4 % (ref 0.3–6.2)
ERYTHROCYTE [DISTWIDTH] IN BLOOD BY AUTOMATED COUNT: 13.4 % (ref 12.3–15.4)
GLOBULIN UR ELPH-MCNC: 3.1 GM/DL
GLUCOSE SERPL-MCNC: 89 MG/DL (ref 65–99)
HCT VFR BLD AUTO: 40.1 % (ref 34–46.6)
HGB BLD-MCNC: 12.8 G/DL (ref 12–15.9)
IMM GRANULOCYTES # BLD AUTO: 0.02 10*3/MM3 (ref 0–0.05)
IMM GRANULOCYTES NFR BLD AUTO: 0.4 % (ref 0–0.5)
LYMPHOCYTES # BLD AUTO: 1.12 10*3/MM3 (ref 0.7–3.1)
LYMPHOCYTES NFR BLD AUTO: 22.2 % (ref 19.6–45.3)
MCH RBC QN AUTO: 27.2 PG (ref 26.6–33)
MCHC RBC AUTO-ENTMCNC: 31.9 G/DL (ref 31.5–35.7)
MCV RBC AUTO: 85.1 FL (ref 79–97)
MONOCYTES # BLD AUTO: 0.41 10*3/MM3 (ref 0.1–0.9)
MONOCYTES NFR BLD AUTO: 8.1 % (ref 5–12)
NEUTROPHILS NFR BLD AUTO: 3.36 10*3/MM3 (ref 1.7–7)
NEUTROPHILS NFR BLD AUTO: 66.5 % (ref 42.7–76)
NRBC BLD AUTO-RTO: 0 /100 WBC (ref 0–0.2)
PLATELET # BLD AUTO: 225 10*3/MM3 (ref 140–450)
PMV BLD AUTO: 9.6 FL (ref 6–12)
POTASSIUM SERPL-SCNC: 4.2 MMOL/L (ref 3.5–5.2)
PROT SERPL-MCNC: 7.4 G/DL (ref 6–8.5)
RBC # BLD AUTO: 4.71 10*6/MM3 (ref 3.77–5.28)
SODIUM SERPL-SCNC: 139 MMOL/L (ref 136–145)
WBC NRBC COR # BLD AUTO: 5.05 10*3/MM3 (ref 3.4–10.8)

## 2025-07-31 PROCEDURE — 25510000001 IOPAMIDOL 61 % SOLUTION: Performed by: INTERNAL MEDICINE

## 2025-07-31 PROCEDURE — 80053 COMPREHEN METABOLIC PANEL: CPT | Performed by: INTERNAL MEDICINE

## 2025-07-31 PROCEDURE — 71260 CT THORAX DX C+: CPT

## 2025-07-31 PROCEDURE — 82378 CARCINOEMBRYONIC ANTIGEN: CPT | Performed by: INTERNAL MEDICINE

## 2025-07-31 PROCEDURE — G0463 HOSPITAL OUTPT CLINIC VISIT: HCPCS | Performed by: RADIOLOGY

## 2025-07-31 PROCEDURE — 85025 COMPLETE CBC W/AUTO DIFF WBC: CPT | Performed by: INTERNAL MEDICINE

## 2025-07-31 RX ORDER — IOPAMIDOL 612 MG/ML
100 INJECTION, SOLUTION INTRAVASCULAR
Status: COMPLETED | OUTPATIENT
Start: 2025-07-31 | End: 2025-07-31

## 2025-07-31 RX ADMIN — IOPAMIDOL 100 ML: 612 INJECTION, SOLUTION INTRAVENOUS at 08:01

## (undated) DEVICE — SENSR O2 OXIMAX FNGR A/ 18IN NONSTR

## (undated) DEVICE — SYR CONTRL LUERLOK 10CC

## (undated) DEVICE — ELECTRODE,ECG,STRESS,FOAM,50PK: Brand: MEDLINE

## (undated) DEVICE — PAD,NON-ADHERENT,3X8,STERILE,LF,1/PK: Brand: MEDLINE

## (undated) DEVICE — UTILITY MARKER W/MED LABELS: Brand: MEDLINE

## (undated) DEVICE — PAD MINOR UNIVERSAL: Brand: MEDLINE INDUSTRIES, INC.

## (undated) DEVICE — SUT VIC 1 XLH 27IN VCP583G

## (undated) DEVICE — ADAPT SWVL FIBROPTIC BRONCH

## (undated) DEVICE — COVER,MAYO STAND,STERILE: Brand: MEDLINE

## (undated) DEVICE — SUT MNCRYL 4/0 PS2 27IN UD MCP426H

## (undated) DEVICE — SUT PROLN 2/0 SH 36IN 8523H

## (undated) DEVICE — 3M™ IOBAN™ 2 ANTIMICROBIAL INCISE DRAPE 6650EZ: Brand: IOBAN™ 2

## (undated) DEVICE — Device: Brand: ION

## (undated) DEVICE — GLV SURG BIOGEL M LTX PF 7 1/2

## (undated) DEVICE — SINGLE USE SUCTION VALVE MAJ-209: Brand: SINGLE USE SUCTION VALVE (STERILE)

## (undated) DEVICE — SUT SILK 2/0 SUTUPAK TIES 24IN SA75H

## (undated) DEVICE — SUT PROLN 4/0 RB1 D/A 36IN 8557H

## (undated) DEVICE — SUT VIC 0 CT1 CR8 27IN UD VCPP41D

## (undated) DEVICE — TRAP,MUCUS SPECIMEN, 80CC: Brand: MEDLINE

## (undated) DEVICE — CONTAINER,SPECIMEN,OR STERILE,4OZ: Brand: MEDLINE

## (undated) DEVICE — ELECTRD BLD MEGADYNE EZCLEAN STD 2.75IN XLNG

## (undated) DEVICE — 1LYRTR 16FR10ML100%SIL UMS SNP: Brand: MEDLINE INDUSTRIES, INC.

## (undated) DEVICE — SPONGE,DISSECTOR,ROUND CHERRY,XR,ST,5/PK: Brand: MEDLINE

## (undated) DEVICE — ELECTRD BLD EZ CLN STD 6.5IN

## (undated) DEVICE — DECANTER: Brand: UNBRANDED

## (undated) DEVICE — SUT VIC 0 CTX 27IN VCP364H

## (undated) DEVICE — CVR UNIV C/ARM

## (undated) DEVICE — DRAPE,UTILITY,TAPE,15X26,STERILE: Brand: MEDLINE

## (undated) DEVICE — SUT SILK 4/0 SUTUPAK TIES 24IN SA73H

## (undated) DEVICE — SUT SILK 0 SUTUPAK TIES 24IN SA76G

## (undated) DEVICE — TRAP FLD MINIVAC MEGADYNE 100ML

## (undated) DEVICE — ADHS SKIN PREMIERPRO EXOFIN TOPICAL HI/VISC .5ML

## (undated) DEVICE — SUT GUT CHRM 3/0 SH 27IN G122H

## (undated) DEVICE — KT NDL GUIDE STRL 18GA

## (undated) DEVICE — BG OR ZSUT SADDLE 20IN CLR STRL

## (undated) DEVICE — LP VESL MAXI 2.5X1MM RED 2PK

## (undated) DEVICE — 24 FR STRAIGHT – SOFT PVC CATHETER: Brand: PVC THORACIC CATHETERS

## (undated) DEVICE — SPNG GZ WOVN 4X4IN 12PLY 10/BX STRL

## (undated) DEVICE — GLV SURG SENSICARE W/ALOE PF LF 7.5 STRL

## (undated) DEVICE — BLD STERNONTOMY REPEAT 44.6CM

## (undated) DEVICE — THE CHANNEL CLEANING BRUSH IS A NYLON FLEXI BRUSH ATTACHED TO A FLEXIBLE PLASTIC SHEATH DESIGNED TO SAFELY REMOVE DEBRIS FROM FLEXIBLE ENDOSCOPES.

## (undated) DEVICE — GLV SURG BIOGEL LTX PF 7 1/2

## (undated) DEVICE — THE ECHELON FLEX POWERED PLUS ARTICULATING ENDOSCOPIC LINEAR CUTTERS ARE STERILE, SINGLE PATIENT USE INSTRUMENTS THAT SIMULTANEOUSLYCUT AND STAPLE TISSUE. THERE ARE SIX STAGGERED ROWS OF STAPLES, THREE ON EITHER SIDE OF THE CUT LINE. THE ECHELON FLEX 45 POWERED PLUSINSTRUMENTS HAVE A STAPLE LINE THAT IS APPROXIMATELY 45 MM LONG AND A CUT LINE THAT IS APPROXIMATELY 42 MM LONG. THE SHAFT CAN ROTATE FREELYIN BOTH DIRECTIONS AND AN ARTICULATION MECHANISM ENABLES THE DISTAL PORTION OF THE SHAFT TO PIVOT TO FACILITATE LATERAL ACCESS TO THE OPERATIVESITE.THE INSTRUMENTS ARE PACKAGED WITH A PRIMARY LITHIUM BATTERY PACK THAT MUST BE INSTALLED PRIOR TO USE. THERE ARE SPECIFIC REQUIREMENTS FORDISPOSING OF THE BATTERY PACK. REFER TO THE BATTERY PACK DISPOSAL SECTION.THE INSTRUMENTS ARE PACKAGED WITHOUT A RELOAD AND MUST BE LOADED PRIOR TO USE. A STAPLE RETAINING CAP ON THE RELOAD PROTECTS THE STAPLE LEGPOINTS DURING SHIPPING AND TRANSPORTATION. THE INSTRUMENTS’ LOCK-OUT FEATURE IS DESIGNED TO PREVENT A USED OR IMPROPERLY INSTALLED RELOADFROM BEING REFIRED OR AN INSTRUMENT FROM BEING FIRED WITHOUT A RELOAD.: Brand: ECHELON FLEX

## (undated) DEVICE — ANTIBACTERIAL UNDYED BRAIDED (POLYGLACTIN 910), SYNTHETIC ABSORBABLE SUTURE: Brand: COATED VICRYL

## (undated) DEVICE — ELECTRD BLD EZ CLN MOD 6.5IN

## (undated) DEVICE — SUT SILK 3/0 SUTUPAK TIES 24IN SA74H

## (undated) DEVICE — TBG PRESS EXT

## (undated) DEVICE — 4-PORT MANIFOLD: Brand: NEPTUNE 2

## (undated) DEVICE — VISION PROBE ADAPTER AND SUCTION ADAPTER

## (undated) DEVICE — TOWEL,OR,DSP,ST,BLUE,STD,4/PK,20PK/CS: Brand: MEDLINE

## (undated) DEVICE — INTENDED FOR TISSUE SEPARATION, AND OTHER PROCEDURES THAT REQUIRE A SHARP SURGICAL BLADE TO PUNCTURE OR CUT.: Brand: BARD-PARKER ® STAINLESS STEEL BLADES

## (undated) DEVICE — CVR HNDL LIGHT RIGID

## (undated) DEVICE — SUT POLY BR TP 2STRND 1/8X30IN

## (undated) DEVICE — KT ASP VIZISHOT 5SYRG 5BIOPSY/VLV 22G

## (undated) DEVICE — TBG SMPL FLTR LINE NASL 02/C02 A/ BX/100

## (undated) DEVICE — SPONGE,LAP,12"X12",XR,ST,5/PK,40PK/CS: Brand: MEDLINE

## (undated) DEVICE — SINGLE USE BIOPSY VALVE MAJ-210: Brand: SINGLE USE BIOPSY VALVE (STERILE)

## (undated) DEVICE — POSITIONER,HEAD,MULTIRING,36CS: Brand: MEDLINE

## (undated) DEVICE — BIOPSY NEEDLE, 23G: Brand: FLEXISION

## (undated) DEVICE — 24 FR RIGHT ANGLE – SOFT PVC CATHETER: Brand: PVC THORACIC CATHETERS

## (undated) DEVICE — CLTH CLENS READYCLEANSE PERI CARE PK/5

## (undated) DEVICE — SUT SILK 2/0 FS BLK 18IN 685G

## (undated) DEVICE — FRCP BX RADJAW4 PULM WO NDL STD1.8X2 100

## (undated) DEVICE — Device: Brand: BALLOON

## (undated) DEVICE — NDL HYPO PRECISIONGLIDE REG 25G 1 1/2

## (undated) DEVICE — ECHELON FLEX  POWERED VASCULAR STAPLER WITH ADVANCED PLACEMENT TIP, 35MM: Brand: ECHELON FLEX

## (undated) DEVICE — SWIVEL CONNECTOR

## (undated) DEVICE — APPL CHLORAPREP HI/LITE 26ML ORNG